# Patient Record
Sex: FEMALE | Race: WHITE | NOT HISPANIC OR LATINO | Employment: FULL TIME | URBAN - METROPOLITAN AREA
[De-identification: names, ages, dates, MRNs, and addresses within clinical notes are randomized per-mention and may not be internally consistent; named-entity substitution may affect disease eponyms.]

---

## 2017-02-09 ENCOUNTER — OFFICE VISIT (OUTPATIENT)
Dept: DERMATOLOGY | Facility: CLINIC | Age: 62
End: 2017-02-09
Payer: COMMERCIAL

## 2017-02-09 VITALS — HEIGHT: 62 IN | WEIGHT: 136 LBS | BODY MASS INDEX: 25.03 KG/M2

## 2017-02-09 DIAGNOSIS — D22.9 MULTIPLE BENIGN NEVI: ICD-10-CM

## 2017-02-09 DIAGNOSIS — Z12.83 SKIN CANCER SCREENING: ICD-10-CM

## 2017-02-09 DIAGNOSIS — L91.8 CUTANEOUS SKIN TAGS: Primary | ICD-10-CM

## 2017-02-09 DIAGNOSIS — R20.9 SKIN SENSATION DISTURBANCE: ICD-10-CM

## 2017-02-09 DIAGNOSIS — L82.1 SEBORRHEIC KERATOSES: ICD-10-CM

## 2017-02-09 PROCEDURE — 11200 RMVL SKIN TAGS UP TO&INC 15: CPT | Mod: S$GLB,,, | Performed by: DERMATOLOGY

## 2017-02-09 PROCEDURE — 99203 OFFICE O/P NEW LOW 30 MIN: CPT | Mod: 25,S$GLB,, | Performed by: DERMATOLOGY

## 2017-02-09 PROCEDURE — 99999 PR PBB SHADOW E&M-EST. PATIENT-LVL II: CPT | Mod: PBBFAC,,, | Performed by: DERMATOLOGY

## 2017-02-09 NOTE — PROGRESS NOTES
Subjective:       Patient ID:  Mallorychiara David is a 61 y.o. female who presents for   Chief Complaint   Patient presents with    Skin Check    Lesion    Dry Skin     HPI Comments: Pt here for IV skin check. Last skin exam several years ago  Lesion on left nose present for most of her life, asymptomatic, not treated  Dry patches of skin on left lower leg for several months, not treated  Lesion on right cheek for several months, asymptomatic, not treated    No phx of skin cancer  Mother and Father hx of BCC  Uses Neutrogena cream Spf 15 healthy skin on face daily     Growths on right side of neck, irritating, months, desires removal.     Review of Systems   Skin: Positive for daily sunscreen use and activity-related sunscreen use. Negative for recent sunburn.        Objective:    Physical Exam   Constitutional: She appears well-developed and well-nourished. No distress.   HENT:   Head:       Mouth/Throat: Lips normal.    Eyes: Lids are normal.  No conjunctival no injection.   Cardiovascular: There is no local extremity swelling and no dependent edema.     Neurological: She is alert and oriented to person, place, and time. She is not disoriented.   Psychiatric: She has a normal mood and affect.   Skin:   Areas Examined (abnormalities noted in diagram):   Head / Face Inspection Performed  Neck Inspection Performed  Chest / Axilla Inspection Performed  Abdomen Inspection Performed  Genitals / Buttocks / Groin Inspection Performed  Back Inspection Performed  RUE Inspected  LUE Inspection Performed  RLE Inspected  LLE Inspection Performed              Diagram Legend     Erythematous scaling macule/papule c/w actinic keratosis       Vascular papule c/w angioma      Pigmented verrucoid papule/plaque c/w seborrheic keratosis      Yellow umbilicated papule c/w sebaceous hyperplasia      Irregularly shaped tan macule c/w lentigo     1-2 mm smooth white papules consistent with Milia      Movable subcutaneous cyst with  punctum c/w epidermal inclusion cyst      Subcutaneous movable cyst c/w pilar cyst      Firm pink to brown papule c/w dermatofibroma      Pedunculated fleshy papule(s) c/w skin tag(s)      Evenly pigmented macule c/w junctional nevus     Mildly variegated pigmented, slightly irregular-bordered macule c/w mildly atypical nevus      Flesh colored to evenly pigmented papule c/w intradermal nevus       Pink pearly papule/plaque c/w basal cell carcinoma      Erythematous hyperkeratotic cursted plaque c/w SCC      Surgical scar with no sign of skin cancer recurrence      Open and closed comedones      Inflammatory papules and pustules      Verrucoid papule consistent consistent with wart     Erythematous eczematous patches and plaques     Dystrophic onycholytic nail with subungual debris c/w onychomycosis     Umbilicated papule    Erythematous-base heme-crusted tan verrucoid plaque consistent with inflamed seborrheic keratosis     Erythematous Silvery Scaling Plaque c/w Psoriasis     See annotation      Assessment / Plan:        Cutaneous skin tags  Reassurance given to patient. No treatment is necessary.   Treatment of benign, asymptomatic lesions may be considered cosmetic.  Verbal consent obtained. 2 lesions removed with scissor snip removal after anesthesia with 1% lidocaine with epinephrine. Hemostasis achieved with aluminum chloride and hyfrecation. No complications.      Skin sensation disturbance    Multiple benign nevi  Discussed ABCDE's of nevi.  Monitor for new mole or moles that are becoming bigger, darker, irritated, or developing irregular borders.   rec at least annual derm f/u    Skin cancer screening  Upper body skin examination performed today including at least 6 points as noted in physical examination. No lesions suspicious for malignancy noted.        Seborrheic keratoses, trunk and extremities  These are benign inherited growths without a malignant potential. Reassurance given to patient. No  treatment is necessary.              Return in about 6 months (around 8/9/2017).

## 2017-02-28 ENCOUNTER — OFFICE VISIT (OUTPATIENT)
Dept: FAMILY MEDICINE | Facility: CLINIC | Age: 62
End: 2017-02-28
Payer: COMMERCIAL

## 2017-02-28 VITALS
HEIGHT: 62 IN | BODY MASS INDEX: 28.16 KG/M2 | TEMPERATURE: 99 F | HEART RATE: 68 BPM | SYSTOLIC BLOOD PRESSURE: 110 MMHG | WEIGHT: 153 LBS | DIASTOLIC BLOOD PRESSURE: 72 MMHG

## 2017-02-28 DIAGNOSIS — J06.9 UPPER RESPIRATORY TRACT INFECTION, UNSPECIFIED TYPE: Primary | ICD-10-CM

## 2017-02-28 PROCEDURE — 99999 PR PBB SHADOW E&M-EST. PATIENT-LVL III: CPT | Mod: PBBFAC,,, | Performed by: NURSE PRACTITIONER

## 2017-02-28 PROCEDURE — 3078F DIAST BP <80 MM HG: CPT | Mod: S$GLB,,, | Performed by: NURSE PRACTITIONER

## 2017-02-28 PROCEDURE — 99213 OFFICE O/P EST LOW 20 MIN: CPT | Mod: S$GLB,,, | Performed by: NURSE PRACTITIONER

## 2017-02-28 PROCEDURE — 1160F RVW MEDS BY RX/DR IN RCRD: CPT | Mod: S$GLB,,, | Performed by: NURSE PRACTITIONER

## 2017-02-28 PROCEDURE — 3074F SYST BP LT 130 MM HG: CPT | Mod: S$GLB,,, | Performed by: NURSE PRACTITIONER

## 2017-02-28 RX ORDER — BENZONATATE 200 MG/1
200 CAPSULE ORAL 3 TIMES DAILY PRN
Qty: 30 CAPSULE | Refills: 0 | Status: SHIPPED | OUTPATIENT
Start: 2017-02-28 | End: 2017-03-10

## 2017-02-28 RX ORDER — AZITHROMYCIN 250 MG/1
TABLET, FILM COATED ORAL
Qty: 6 TABLET | Refills: 0 | Status: SHIPPED | OUTPATIENT
Start: 2017-02-28 | End: 2017-03-05

## 2017-02-28 NOTE — MR AVS SNAPSHOT
[facility]  [address] [place] [address]  Phone: [phone]  Fax: [phone]                  Mallory David   2017 11:40 AM   Office Visit    Description:  Female : 1955   Provider:  Maira Sanchez NP   Department:  [facility]           Reason for Visit     Cough     Sore Throat           Diagnoses this Visit        Comments    Upper respiratory tract infection, unspecified type    -  Primary            To Do List           Future Appointments        Provider Department Dept Phone    4/3/2017 8:40 AM LABORATORY, [place] [facility] [phone]      Goals (5 Years of Data)     None       These Medications        Disp Refills Start End    azithromycin (Z-DWAYNE) 250 MG tablet 6 tablet 0 2017 3/5/2017    Take 2 tablets by mouth on day 1; Take 1 tablet by mouth on days 2-5    Pharmacy: [facility] [address] [place], [place] [address] [place] [address] [address] [facility] Ph #: [phone]       benzonatate (TESSALON) 200 MG capsule 30 capsule 0 2017 3/10/2017    Take 1 capsule (200 mg total) by mouth 3 (three) times daily as needed. - Oral    Pharmacy: [facility] [address] [place], [place] [address] AT [place] [address] [address] [facility] Ph #: [phone]         [facility]     [facility] -  Assistance  Registered nurses in the [facility] provide clinical advisement, health education, appointment booking, and other advisory services.  Call for this free service at [phone].             Medications           Message regarding Medications     Verify the changes and/or additions to your medication regime listed below are the same as discussed with your clinician today.  If any of these changes or additions are incorrect, please notify your healthcare provider.        START taking these NEW medications        Refills    azithromycin (Z-DWAYNE)  250 MG tablet 0    Sig: Take 2 tablets by mouth on day 1; Take 1 tablet by mouth on days 2-5    Class: Normal    benzonatate (TESSALON) 200 MG capsule 0    Sig: Take 1 capsule (200 mg total) by mouth 3 (three) times daily as needed.    Class: Normal    Route: Oral      STOP taking these medications     CALCIUM CARB/MAGNESIUM CMB #10 (LINA-MAG ORAL) Take by mouth.    multivitamin capsule Take 1 capsule by mouth once daily.           Verify that the below list of medications is an accurate representation of the medications you are currently taking.  If none reported, the list may be blank. If incorrect, please contact your healthcare provider. Carry this list with you in case of emergency.           Current Medications     ANTIOX #11/OM3/DHA/EPA/LUT/NAMAN (50+ ADULT EYE HEALTH ORAL) Take by mouth.    aspirin 81 MG chewable tablet Take 81 mg by mouth once daily.      cetirizine (ZYRTEC) 10 MG tablet Take 10 mg by mouth once daily.    CRESTOR 10 mg tablet TAKE ONE TABLET BY MOUTH ONCE DAILY    hydrochlorothiazide (HYDRODIURIL) 25 MG tablet TAKE ONE TABLET BY MOUTH EVERY MORNING    levothyroxine (SYNTHROID) 112 MCG tablet Take 1 tablet (112 mcg total) by mouth once daily.    metoprolol succinate (TOPROL-XL) 50 MG 24 hr tablet TAKE ONE TABLET BY MOUTH EVERY MORNING AND TAKE 1/2 TABLET EVERY EVENING    potassium chloride SA (K-DUR,KLOR-CON) 20 MEQ tablet TAKE ONE TABLET BY MOUTH ONCE DAILY    azithromycin (Z-DWAYNE) 250 MG tablet Take 2 tablets by mouth on day 1; Take 1 tablet by mouth on days 2-5    benzonatate (TESSALON) 200 MG capsule Take 1 capsule (200 mg total) by mouth 3 (three) times daily as needed.           Clinical Reference Information           Your Vitals Were     BP                   110/72           Blood Pressure          Most Recent Value    BP  110/72      Allergies as of 2/28/2017     No Known Drug Allergies      Immunizations Administered on Date of Encounter - 2/28/2017     None      Language Assistance  Services     ATTENTION: Language assistance services are available, free of charge. Please call [phone].      ATENCIÓN: Si habla bulmaro, tiene a sparrow disposición servicios gratuitos de asistencia lingüística. Llame al [phone].     CHÚ Ý: N?u b?n nói Ti?ng Vi?t, có các d?ch v? h? tr? ngôn ng? mi?n phí dành cho b?n. G?i s? [phone].         [facility] complies with applicable Federal civil rights laws and does not discriminate on the basis of race, color, national origin, age, disability, or sex.

## 2017-02-28 NOTE — PROGRESS NOTES
Subjective:       Patient ID: Mallorychiara David is a 61 y.o. female.    Chief Complaint: Cough and Sore Throat    URI    This is a new problem. The current episode started in the past 7 days. The problem has been gradually worsening. There has been no fever. Associated symptoms include congestion, coughing, headaches, rhinorrhea and a sore throat. Pertinent negatives include no abdominal pain, chest pain, diarrhea, dysuria, ear pain, joint pain, joint swelling, nausea, neck pain, plugged ear sensation, rash, sinus pain, sneezing, swollen glands, vomiting or wheezing. She has tried nothing for the symptoms. The treatment provided no relief.     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    HENT: Positive for congestion, rhinorrhea and sore throat. Negative for ear pain and sneezing.    Eyes: Negative.    Respiratory: Positive for cough. Negative for wheezing.    Cardiovascular: Negative.  Negative for  chest pain.   Gastrointestinal: Negative.  Negative for abdominal pain, diarrhea, nausea and vomiting.   Endocrine: Negative.    Genitourinary: Negative.  Negative for dysuria.   Musculoskeletal: Negative.  Negative for joint pain and neck pain.   Skin: Negative.  Negative for rash.   Allergic/Immunologic: Negative.    Neurological: Positive for headaches.   Psychiatric/Behavioral: Negative.        Objective:      Physical Exam   Constitutional: She is oriented to person, place, and time. She appears well-developed and well-nourished.   HENT:   Head: Normocephalic.   Right Ear: Hearing, tympanic membrane, external ear and ear canal normal.   Left Ear: Hearing, tympanic membrane, external ear and ear canal normal.   Nose: Mucosal edema and rhinorrhea present. Right sinus exhibits no maxillary sinus tenderness and no frontal sinus tenderness. Left sinus exhibits no maxillary sinus tenderness and no frontal sinus tenderness.   Mouth/Throat: Uvula is midline and mucous membranes are normal. Posterior oropharyngeal erythema present.   Eyes: Conjunctivae are normal. Pupils are equal, round, and reactive to light.   Neck: Normal range of motion. Neck supple.   Cardiovascular: Normal rate, regular rhythm and normal heart sounds.    Pulmonary/Chest: Effort normal and breath sounds normal.   Abdominal: Soft. Bowel sounds are normal.   Musculoskeletal: Normal range of motion.   Neurological: She is alert and oriented to person, place, and time.   Skin: Skin is warm and dry.   Psychiatric: She has a normal mood and affect. Her behavior is normal. Judgment and thought content normal.   Nursing note and vitals reviewed.      Assessment:       1. Upper respiratory tract infection, unspecified type        Plan:           Mallory was seen today for cough and sore throat.    Diagnoses and all orders for this visit:    Upper respiratory tract infection, unspecified type  -     azithromycin (Z-DWAYNE) 250 MG tablet; Take 2 tablets by mouth  on day 1; Take 1 tablet by mouth on days 2-5  -     benzonatate (TESSALON) 200 MG capsule; Take 1 capsule (200 mg total) by mouth 3 (three) times daily as needed.  Begin abx if symptoms persist >7-10 d

## 2017-03-31 RX ORDER — LEVOTHYROXINE SODIUM 112 UG/1
112 TABLET ORAL DAILY
Qty: 90 TABLET | Refills: 2 | Status: SHIPPED | OUTPATIENT
Start: 2017-03-31 | End: 2017-09-29 | Stop reason: SDUPTHER

## 2017-04-03 ENCOUNTER — LAB VISIT (OUTPATIENT)
Dept: LAB | Facility: HOSPITAL | Age: 62
End: 2017-04-03
Attending: FAMILY MEDICINE
Payer: COMMERCIAL

## 2017-04-03 DIAGNOSIS — E78.5 HYPERLIPIDEMIA, UNSPECIFIED HYPERLIPIDEMIA TYPE: ICD-10-CM

## 2017-04-03 LAB
ALBUMIN SERPL BCP-MCNC: 4.3 G/DL
ALP SERPL-CCNC: 72 U/L
ALT SERPL W/O P-5'-P-CCNC: 31 U/L
AST SERPL-CCNC: 25 U/L
BILIRUB DIRECT SERPL-MCNC: 0.4 MG/DL
BILIRUB SERPL-MCNC: 0.9 MG/DL
CHOLEST/HDLC SERPL: 2.4 {RATIO}
HDL/CHOLESTEROL RATIO: 41 %
HDLC SERPL-MCNC: 161 MG/DL
HDLC SERPL-MCNC: 66 MG/DL
LDLC SERPL CALC-MCNC: 85.8 MG/DL
NONHDLC SERPL-MCNC: 95 MG/DL
PROT SERPL-MCNC: 7.5 G/DL
TRIGL SERPL-MCNC: 46 MG/DL

## 2017-04-03 PROCEDURE — 80061 LIPID PANEL: CPT

## 2017-04-03 PROCEDURE — 36415 COLL VENOUS BLD VENIPUNCTURE: CPT | Mod: PO

## 2017-04-03 PROCEDURE — 80076 HEPATIC FUNCTION PANEL: CPT

## 2017-04-04 ENCOUNTER — PATIENT MESSAGE (OUTPATIENT)
Dept: FAMILY MEDICINE | Facility: CLINIC | Age: 62
End: 2017-04-04

## 2017-04-05 RX ORDER — ROSUVASTATIN CALCIUM 10 MG/1
10 TABLET, COATED ORAL DAILY
Qty: 30 TABLET | Refills: 11 | Status: SHIPPED | OUTPATIENT
Start: 2017-04-05 | End: 2018-05-03 | Stop reason: SDUPTHER

## 2017-04-05 NOTE — TELEPHONE ENCOUNTER
Health Maintenance Due   Topic Date Due    TETANUS VACCINE  09/16/1974    Influenza Vaccine  08/01/2016    Zoster Vaccine  09/16/2016    Mammogram  05/05/2017     Book the above.

## 2017-05-29 RX ORDER — POTASSIUM CHLORIDE 20 MEQ/1
20 TABLET, EXTENDED RELEASE ORAL DAILY
Qty: 30 TABLET | Refills: 3 | Status: SHIPPED | OUTPATIENT
Start: 2017-05-29 | End: 2017-06-27 | Stop reason: SDUPTHER

## 2017-06-27 RX ORDER — POTASSIUM CHLORIDE 20 MEQ/1
20 TABLET, EXTENDED RELEASE ORAL DAILY
Qty: 30 TABLET | Refills: 3 | Status: SHIPPED | OUTPATIENT
Start: 2017-06-27 | End: 2018-05-04 | Stop reason: SDUPTHER

## 2017-07-31 ENCOUNTER — PATIENT MESSAGE (OUTPATIENT)
Dept: FAMILY MEDICINE | Facility: CLINIC | Age: 62
End: 2017-07-31

## 2017-07-31 NOTE — TELEPHONE ENCOUNTER
Pt wanted an appointment today with Dr. Sandoval. Pt states she does not want to see a nurse practitioner at all. Pt scheduled with Dr. Banks 08/02/17 @ 7:40.

## 2017-08-02 ENCOUNTER — OFFICE VISIT (OUTPATIENT)
Dept: FAMILY MEDICINE | Facility: CLINIC | Age: 62
End: 2017-08-02
Payer: COMMERCIAL

## 2017-08-02 ENCOUNTER — HOSPITAL ENCOUNTER (OUTPATIENT)
Dept: RADIOLOGY | Facility: HOSPITAL | Age: 62
Discharge: HOME | End: 2017-08-02
Attending: FAMILY MEDICINE
Payer: COMMERCIAL

## 2017-08-02 VITALS
HEART RATE: 81 BPM | DIASTOLIC BLOOD PRESSURE: 74 MMHG | SYSTOLIC BLOOD PRESSURE: 125 MMHG | HEIGHT: 62 IN | BODY MASS INDEX: 29.26 KG/M2 | WEIGHT: 159 LBS | TEMPERATURE: 99 F

## 2017-08-02 DIAGNOSIS — M25.562 ACUTE PAIN OF LEFT KNEE: ICD-10-CM

## 2017-08-02 DIAGNOSIS — M25.562 ACUTE PAIN OF LEFT KNEE: Primary | ICD-10-CM

## 2017-08-02 PROCEDURE — 73562 X-RAY EXAM OF KNEE 3: CPT | Mod: 26,LT,, | Performed by: RADIOLOGY

## 2017-08-02 PROCEDURE — 99213 OFFICE O/P EST LOW 20 MIN: CPT | Mod: S$GLB,,, | Performed by: FAMILY MEDICINE

## 2017-08-02 PROCEDURE — 73560 X-RAY EXAM OF KNEE 1 OR 2: CPT | Mod: 26,RT,, | Performed by: RADIOLOGY

## 2017-08-02 PROCEDURE — 99999 PR PBB SHADOW E&M-EST. PATIENT-LVL IV: CPT | Mod: PBBFAC,,, | Performed by: FAMILY MEDICINE

## 2017-08-02 PROCEDURE — 73560 X-RAY EXAM OF KNEE 1 OR 2: CPT | Mod: TC,PO,RT

## 2017-08-02 RX ORDER — MELOXICAM 15 MG/1
15 TABLET ORAL DAILY
Qty: 30 TABLET | Refills: 0 | Status: SHIPPED | OUTPATIENT
Start: 2017-08-02 | End: 2017-09-01

## 2017-08-02 NOTE — PROGRESS NOTES
"Subjective:      Patient ID: Mallory David is a 61 y.o. female.    Chief Complaint: Leg Pain    HPIchely fell onto her right knee last week and she states that the pain in the right side was the biggest complaint at the time however over time, she has had increased pain in the left knee.  4 days ago, she had gone to [place] and she states that she could not walk good.  She had on tennis shoes.  She took this off and she states she could not stand on her left leg.  She tried stretching it ot help it.  She had a heavinesss of     Review of Systems   Constitutional: Positive for activity change. Negative for unexpected weight change.   HENT: Negative for hearing loss, rhinorrhea and trouble swallowing.    Eyes: Negative for discharge and visual disturbance.   Respiratory: Negative for chest tightness and wheezing.    Cardiovascular: Negative for chest pain and palpitations.   Gastrointestinal: Negative for blood in stool, constipation, diarrhea and vomiting.   Endocrine: Negative for polydipsia and polyuria.   Genitourinary: Negative for difficulty urinating, dysuria, hematuria and menstrual problem.   Musculoskeletal: Positive for arthralgias and joint swelling. Negative for neck pain.   Neurological: Negative for headaches.   Psychiatric/Behavioral: Negative for confusion and dysphoric mood.       Objective:   /74   Pulse 81   Temp 98.7 °F (37.1 °C) (Oral)   Ht 5' 2" (1.575 m)   Wt 72.1 kg (159 lb)   LMP 09/20/2011   BMI 29.08 kg/m²     Physical Exam   Musculoskeletal:        Left knee: She exhibits swelling (this is in the popliteal fossa.  ). She exhibits normal range of motion, no effusion, no deformity, no erythema, normal alignment, no LCL laxity, no bony tenderness, normal meniscus and no MCL laxity. Tenderness found. Medial joint line and lateral joint line tenderness noted.     Xray was neg.    Assessment:     1. Acute pain of left knee        Plan:   Mallory was seen today for leg pain.    Diagnoses and " all orders for this visit:    Acute pain of left knee  -     Cancel: X-Ray Knee 1 or 2 View Left; Future    Other orders  -     meloxicam (MOBIC) 15 MG tablet; Take 1 tablet (15 mg total) by mouth once daily.

## 2017-09-29 DIAGNOSIS — Z12.31 VISIT FOR SCREENING MAMMOGRAM: Primary | ICD-10-CM

## 2017-09-29 RX ORDER — HYDROCHLOROTHIAZIDE 25 MG/1
25 TABLET ORAL EVERY MORNING
Qty: 90 TABLET | Refills: 3 | Status: SHIPPED | OUTPATIENT
Start: 2017-09-29 | End: 2018-06-06 | Stop reason: SDUPTHER

## 2017-09-29 RX ORDER — LEVOTHYROXINE SODIUM 112 UG/1
112 TABLET ORAL DAILY
Qty: 90 TABLET | Refills: 2 | Status: SHIPPED | OUTPATIENT
Start: 2017-09-29 | End: 2018-06-06 | Stop reason: SDUPTHER

## 2017-09-29 RX ORDER — METOPROLOL SUCCINATE 50 MG/1
TABLET, EXTENDED RELEASE ORAL
Qty: 135 TABLET | Refills: 3 | Status: SHIPPED | OUTPATIENT
Start: 2017-09-29 | End: 2018-06-06 | Stop reason: SDUPTHER

## 2017-10-02 DIAGNOSIS — Z23 NEED FOR PROPHYLACTIC VACCINATION AND INOCULATION AGAINST INFLUENZA: Primary | ICD-10-CM

## 2017-10-05 ENCOUNTER — HOSPITAL ENCOUNTER (OUTPATIENT)
Dept: RADIOLOGY | Facility: HOSPITAL | Age: 62
Discharge: HOME | End: 2017-10-05
Attending: FAMILY MEDICINE
Payer: COMMERCIAL

## 2017-10-05 ENCOUNTER — CLINICAL SUPPORT (OUTPATIENT)
Dept: FAMILY MEDICINE | Facility: CLINIC | Age: 62
End: 2017-10-05
Payer: COMMERCIAL

## 2017-10-05 VITALS — HEIGHT: 62 IN | BODY MASS INDEX: 29.25 KG/M2 | WEIGHT: 158.94 LBS

## 2017-10-05 DIAGNOSIS — Z23 ENCOUNTER FOR ADMINISTRATION OF VACCINE: Primary | ICD-10-CM

## 2017-10-05 DIAGNOSIS — Z12.31 VISIT FOR SCREENING MAMMOGRAM: ICD-10-CM

## 2017-10-05 PROCEDURE — 77067 SCR MAMMO BI INCL CAD: CPT | Mod: 26,,, | Performed by: RADIOLOGY

## 2017-10-05 PROCEDURE — 77067 SCR MAMMO BI INCL CAD: CPT | Mod: TC

## 2017-10-05 PROCEDURE — 90471 IMMUNIZATION ADMIN: CPT | Mod: S$GLB,,, | Performed by: FAMILY MEDICINE

## 2017-10-05 PROCEDURE — 90686 IIV4 VACC NO PRSV 0.5 ML IM: CPT | Mod: S$GLB,,, | Performed by: FAMILY MEDICINE

## 2017-10-05 PROCEDURE — 77063 BREAST TOMOSYNTHESIS BI: CPT | Mod: 26,,, | Performed by: RADIOLOGY

## 2018-05-03 RX ORDER — ROSUVASTATIN CALCIUM 10 MG/1
10 TABLET, COATED ORAL DAILY
Qty: 30 TABLET | Refills: 0 | Status: SHIPPED | OUTPATIENT
Start: 2018-05-03 | End: 2018-06-06 | Stop reason: SDUPTHER

## 2018-05-04 RX ORDER — POTASSIUM CHLORIDE 20 MEQ/1
20 TABLET, EXTENDED RELEASE ORAL DAILY
Qty: 30 TABLET | Refills: 3 | Status: SHIPPED | OUTPATIENT
Start: 2018-05-04 | End: 2018-06-06 | Stop reason: SDUPTHER

## 2018-05-31 ENCOUNTER — PATIENT MESSAGE (OUTPATIENT)
Dept: FAMILY MEDICINE | Facility: CLINIC | Age: 63
End: 2018-05-31

## 2018-06-06 ENCOUNTER — PATIENT MESSAGE (OUTPATIENT)
Dept: FAMILY MEDICINE | Facility: CLINIC | Age: 63
End: 2018-06-06

## 2018-06-06 ENCOUNTER — OFFICE VISIT (OUTPATIENT)
Dept: FAMILY MEDICINE | Facility: CLINIC | Age: 63
End: 2018-06-06
Payer: COMMERCIAL

## 2018-06-06 VITALS
BODY MASS INDEX: 30.18 KG/M2 | TEMPERATURE: 99 F | WEIGHT: 164 LBS | SYSTOLIC BLOOD PRESSURE: 125 MMHG | DIASTOLIC BLOOD PRESSURE: 81 MMHG | HEIGHT: 62 IN | HEART RATE: 73 BPM

## 2018-06-06 DIAGNOSIS — Q21.11 ASD (ATRIAL SEPTAL DEFECT), OSTIUM SECUNDUM: ICD-10-CM

## 2018-06-06 DIAGNOSIS — E03.9 HYPOTHYROIDISM, UNSPECIFIED TYPE: ICD-10-CM

## 2018-06-06 DIAGNOSIS — E78.5 HYPERLIPIDEMIA, UNSPECIFIED HYPERLIPIDEMIA TYPE: ICD-10-CM

## 2018-06-06 DIAGNOSIS — I47.10 PAROXYSMAL SUPRAVENTRICULAR TACHYCARDIA: ICD-10-CM

## 2018-06-06 DIAGNOSIS — Z00.00 ANNUAL PHYSICAL EXAM: Primary | ICD-10-CM

## 2018-06-06 DIAGNOSIS — I10 ESSENTIAL HYPERTENSION: ICD-10-CM

## 2018-06-06 PROCEDURE — 99999 PR PBB SHADOW E&M-EST. PATIENT-LVL IV: CPT | Mod: PBBFAC,,, | Performed by: FAMILY MEDICINE

## 2018-06-06 PROCEDURE — 3079F DIAST BP 80-89 MM HG: CPT | Mod: CPTII,S$GLB,, | Performed by: FAMILY MEDICINE

## 2018-06-06 PROCEDURE — 99396 PREV VISIT EST AGE 40-64: CPT | Mod: S$GLB,,, | Performed by: FAMILY MEDICINE

## 2018-06-06 PROCEDURE — 3074F SYST BP LT 130 MM HG: CPT | Mod: CPTII,S$GLB,, | Performed by: FAMILY MEDICINE

## 2018-06-06 RX ORDER — METOPROLOL SUCCINATE 50 MG/1
50 TABLET, EXTENDED RELEASE ORAL DAILY
Qty: 90 TABLET | Refills: 3 | Status: SHIPPED | OUTPATIENT
Start: 2018-06-06 | End: 2019-08-19 | Stop reason: SDUPTHER

## 2018-06-06 RX ORDER — ROSUVASTATIN CALCIUM 10 MG/1
10 TABLET, COATED ORAL DAILY
Qty: 90 TABLET | Refills: 3 | Status: SHIPPED | OUTPATIENT
Start: 2018-06-06 | End: 2018-06-08 | Stop reason: SDUPTHER

## 2018-06-06 RX ORDER — HYDROCHLOROTHIAZIDE 25 MG/1
25 TABLET ORAL EVERY MORNING
Qty: 90 TABLET | Refills: 3 | Status: SHIPPED | OUTPATIENT
Start: 2018-06-06 | End: 2019-06-17 | Stop reason: SDUPTHER

## 2018-06-06 RX ORDER — LEVOTHYROXINE SODIUM 112 UG/1
112 TABLET ORAL DAILY
Qty: 90 TABLET | Refills: 3 | Status: SHIPPED | OUTPATIENT
Start: 2018-06-06 | End: 2019-06-25 | Stop reason: SDUPTHER

## 2018-06-06 RX ORDER — POTASSIUM CHLORIDE 20 MEQ/1
20 TABLET, EXTENDED RELEASE ORAL DAILY
Qty: 30 TABLET | Refills: 3 | Status: SHIPPED | OUTPATIENT
Start: 2018-06-06 | End: 2018-11-27 | Stop reason: SDUPTHER

## 2018-06-06 NOTE — PROGRESS NOTES
"Subjective:      Patient ID: Mallorychiara David is a 62 y.o. female.    Chief Complaint: Annual Exam    HPI She has a history of a nontoxic uninodular goiter and she has seen Dr. Vaca in the past for this and she gets U/S's.    Lab Results   Component Value Date    TSH 2.490 12/02/2016     The patient presents with hyperlipidemia. The patient reports tolerating the medication well and is in excellent compliance. There have been no medication side effects. The patient denies chest pain, neuropathy, and myalgias. The patient has reduced fat intake and has been exercising. Current treatment has included the medications listed in the med card.   Lab Results   Component Value Date    CHOL 161 04/03/2017    CHOL 166 09/30/2016    CHOL 174 03/30/2016     Lab Results   Component Value Date    HDL 66 04/03/2017    HDL 69 09/30/2016    HDL 79 (H) 03/30/2016     Lab Results   Component Value Date    LDLCALC 85.8 04/03/2017    LDLCALC 81.4 09/30/2016    LDLCALC 78.6 03/30/2016     Lab Results   Component Value Date    TRIG 46 04/03/2017    TRIG 78 09/30/2016    TRIG 82 03/30/2016     Lab Results   Component Value Date    CHOLHDL 41.0 04/03/2017    CHOLHDL 41.6 09/30/2016    CHOLHDL 45.4 03/30/2016     The patient presents with essential hypertension.  The patient is tolerating the medication well and is in excellent compliance.  The patient is experiencing no side effects.  Counseling was offered regarding low salt diets.  The patient has a reduced salt intake.  The patient denies chest pain, palpitations, shortness of breath, dyspnea on exertion, left or murmur neck pain, nausea, vomiting, diaphoresis, paroxysmal nocturnal dyspnea, and orthopnea.   /81   Pulse 73   Temp 99.1 °F (37.3 °C) (Oral)   Ht 5' 2" (1.575 m)   Wt 74.4 kg (164 lb)   LMP 09/20/2011   BMI 30.00 kg/m²       She hs had aflutter and she is on the beta blocker successfully without any fluttering.  She has had a workup in the not too distant past with " Dr. Holt but she would like to change cardiologists at this    Past Medical History:  Past Medical History:   Diagnosis Date    *Atrial flutter     s/p ablation    Abnormal Pap smear of cervix     ASD (atrial septal defect), ostium secundum     repaired    Benign hypertensive heart disease without heart failure     Endometriosis     Fatty liver     Hyperlipidemia     Hypertension     2009    Nodular goiter (nontoxic)     1999    Special screening for malignant neoplasms, colon 10/28/2014     Past Surgical History:   Procedure Laterality Date    ABLATION OF DYSRHYTHMIC FOCUS      ASD REPAIR, SECUNDUM      CARPAL TUNNEL RELEASE  2007    right hand    Heart ablation  2011    OOPHORECTOMY      Open Heart Surgery  1970    OVARIAN CYST SURGERY  1981     Review of patient's allergies indicates:   Allergen Reactions    No known drug allergies      Current Outpatient Prescriptions on File Prior to Visit   Medication Sig Dispense Refill    ANTIOX #11/OM3/DHA/EPA/LUT/NAMAN (50+ ADULT EYE HEALTH ORAL) Take by mouth.      aspirin 81 MG chewable tablet Take 81 mg by mouth once daily.        cetirizine (ZYRTEC) 10 MG tablet Take 10 mg by mouth once daily.      hydrochlorothiazide (HYDRODIURIL) 25 MG tablet Take 1 tablet (25 mg total) by mouth every morning. 90 tablet 3    levothyroxine (SYNTHROID) 112 MCG tablet Take 1 tablet (112 mcg total) by mouth once daily. 90 tablet 2    metoprolol succinate (TOPROL-XL) 50 MG 24 hr tablet TAKE ONE TABLET BY MOUTH EVERY MORNING AND TAKE 1/2 TABLET EVERY EVENING (Patient taking differently: Take 50 mg by mouth once daily. TAKE ONE TABLET BY MOUTH EVERY MORNING AND TAKE 1/2 TABLET EVERY EVENING) 135 tablet 3    potassium chloride SA (K-DUR,KLOR-CON) 20 MEQ tablet Take 1 tablet (20 mEq total) by mouth once daily. 30 tablet 3    rosuvastatin (CRESTOR) 10 MG tablet Take 1 tablet (10 mg total) by mouth once daily. 30 tablet 0     No current facility-administered  "medications on file prior to visit.      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   HENT: Negative for congestion, ear pain, hearing loss, postnasal drip, rhinorrhea, sore throat and trouble swallowing.    Eyes: Negative for discharge and visual disturbance.   Respiratory: Negative for cough, chest tightness, shortness of breath and wheezing.    Cardiovascular: Negative for chest pain, palpitations and leg swelling.   Gastrointestinal: Negative for abdominal pain, blood in stool, constipation, diarrhea, nausea and vomiting.   Endocrine: Negative for polydipsia and polyuria.   Genitourinary: Negative for difficulty urinating, dysuria, hematuria and menstrual problem.   Musculoskeletal: Positive for arthralgias. Negative for joint swelling and neck pain.   Neurological: Negative for weakness, numbness and headaches.   Psychiatric/Behavioral: Negative for confusion and dysphoric mood.       Objective:   /81   Pulse 73   Temp 99.1 °F (37.3 °C) (Oral)   Ht 5' 2" (1.575 m)   Wt 74.4 kg (164 lb)   LMP 09/20/2011   BMI 30.00 kg/m²     Physical Exam   Constitutional: She appears well-developed and well-nourished. She is cooperative.   HENT:   Head: Normocephalic and atraumatic.   Right Ear: Tympanic membrane, external ear and ear canal normal. "   Left Ear: Tympanic membrane, external ear and ear canal normal.   Nose: Nose normal.   Mouth/Throat: Uvula is midline and mucous membranes are normal. No oral lesions. No oropharyngeal exudate, posterior oropharyngeal edema or posterior oropharyngeal erythema.   Eyes: EOM and lids are normal. Pupils are equal, round, and reactive to light. Right eye exhibits no discharge. Left eye exhibits no discharge. Right conjunctiva is not injected. Right conjunctiva has no hemorrhage. Left conjunctiva is not injected. Left conjunctiva has no hemorrhage. No scleral icterus. Right eye exhibits no nystagmus. Left eye exhibits no nystagmus.   Neck: Normal range of motion and full passive range of motion without pain. Neck supple. No JVD present. No tracheal tenderness present. Carotid bruit is not present. No tracheal deviation present. No thyroid mass and no thyromegaly present.   Cardiovascular: Normal rate, regular rhythm, S1 normal and S2 normal.    No murmur heard.  Pulses:       Carotid pulses are 2+ on the right side, and 2+ on the left side.       Radial pulses are 2+ on the right side, and 2+ on the left side.        Dorsalis pedis pulses are 2+ on the right side, and 2+ on the left side.        Posterior tibial pulses are 2+ on the right side, and 2+ on the left side.   Pulmonary/Chest: Effort normal and breath sounds normal. No respiratory distress. She has no wheezes. She has no rhonchi. She has no rales.   Abdominal: Soft. Normal appearance, normal aorta and bowel sounds are normal. She exhibits no distension, no abdominal bruit, no pulsatile midline mass and no mass. There is no hepatosplenomegaly. There is no tenderness. There is no rebound.   Musculoskeletal:        Right knee: She exhibits no swelling. No tenderness found.        Left knee: She exhibits no swelling. No tenderness found.        Right foot: There is normal range of motion and no deformity.        Left foot: There is normal range of motion and no  deformity.   Feet:   Right Foot:   Protective Sensation: 10 sites tested. 10 sites sensed.   Skin Integrity: Negative for ulcer, blister, skin breakdown, erythema, warmth, callus or dry skin.   Left Foot:   Protective Sensation: 10 sites tested. 10 sites sensed.   Skin Integrity: Negative for ulcer, blister, skin breakdown, erythema, warmth, callus or dry skin.   Lymphadenopathy:        Head (right side): No submental and no submandibular adenopathy present.        Head (left side): No submental and no submandibular adenopathy present.     She has no cervical adenopathy.   Neurological: She is alert. She has normal strength. No cranial nerve deficit or sensory deficit.   Skin: Skin is warm and dry. No rash noted. No cyanosis. Nails show no clubbing.   Psychiatric: She has a normal mood and affect. Her speech is normal and behavior is normal. Thought content normal. Cognition and memory are normal.       Assessment:     1. Annual physical exam    2. Essential hypertension    3. Hyperlipidemia, unspecified hyperlipidemia type    4. ASD (atrial septal defect), ostium secundum    5. Hypothyroidism, unspecified type    6. Paroxysmal supraventricular tachycardia        Plan:   Mallory was seen today for annual exam.    Diagnoses and all orders for this visit:    Annual physical exam    Essential hypertension  -     Comprehensive metabolic panel; Future  -     potassium chloride SA (K-DUR,KLOR-CON) 20 MEQ tablet; Take 1 tablet (20 mEq total) by mouth once daily.  -     metoprolol succinate (TOPROL-XL) 50 MG 24 hr tablet; Take 1 tablet (50 mg total) by mouth once daily.  -     levothyroxine (SYNTHROID) 112 MCG tablet; Take 1 tablet (112 mcg total) by mouth once daily.  -     hydroCHLOROthiazide (HYDRODIURIL) 25 MG tablet; Take 1 tablet (25 mg total) by mouth every morning.    Hyperlipidemia, unspecified hyperlipidemia type  -     Lipid panel; Future  -     rosuvastatin (CRESTOR) 10 MG tablet; Take 1 tablet (10 mg total) by  mouth once daily.    ASD (atrial septal defect), ostium secundum  -     Ambulatory referral to Cardiology    Hypothyroidism, unspecified type  -     TSH; Future  -     US Soft Tissue Head Neck Thyroid; Future    Paroxysmal supraventricular tachycardia  -     Ambulatory referral to Cardiology  -     metoprolol succinate (TOPROL-XL) 50 MG 24 hr tablet; Take 1 tablet (50 mg total) by mouth once daily.    Other orders  -     Tdap Vaccine

## 2018-06-07 ENCOUNTER — CLINICAL SUPPORT (OUTPATIENT)
Dept: FAMILY MEDICINE | Facility: CLINIC | Age: 63
End: 2018-06-07
Payer: COMMERCIAL

## 2018-06-07 ENCOUNTER — LAB VISIT (OUTPATIENT)
Dept: LAB | Facility: HOSPITAL | Age: 63
End: 2018-06-07
Attending: FAMILY MEDICINE
Payer: COMMERCIAL

## 2018-06-07 DIAGNOSIS — I10 ESSENTIAL HYPERTENSION: ICD-10-CM

## 2018-06-07 DIAGNOSIS — Z23 NEED FOR TDAP VACCINATION: Primary | ICD-10-CM

## 2018-06-07 DIAGNOSIS — E78.5 HYPERLIPIDEMIA, UNSPECIFIED HYPERLIPIDEMIA TYPE: ICD-10-CM

## 2018-06-07 DIAGNOSIS — E03.9 HYPOTHYROIDISM, UNSPECIFIED TYPE: ICD-10-CM

## 2018-06-07 LAB
ALBUMIN SERPL BCP-MCNC: 4.3 G/DL
ALP SERPL-CCNC: 68 U/L
ALT SERPL W/O P-5'-P-CCNC: 19 U/L
ANION GAP SERPL CALC-SCNC: 10 MMOL/L
AST SERPL-CCNC: 20 U/L
BILIRUB SERPL-MCNC: 1.3 MG/DL
BUN SERPL-MCNC: 19 MG/DL
CALCIUM SERPL-MCNC: 10.4 MG/DL
CHLORIDE SERPL-SCNC: 103 MMOL/L
CHOLEST SERPL-MCNC: 196 MG/DL
CHOLEST/HDLC SERPL: 2.6 {RATIO}
CO2 SERPL-SCNC: 30 MMOL/L
CREAT SERPL-MCNC: 0.7 MG/DL
EST. GFR  (AFRICAN AMERICAN): >60 ML/MIN/1.73 M^2
EST. GFR  (NON AFRICAN AMERICAN): >60 ML/MIN/1.73 M^2
GLUCOSE SERPL-MCNC: 107 MG/DL
HDLC SERPL-MCNC: 76 MG/DL
HDLC SERPL: 38.8 %
LDLC SERPL CALC-MCNC: 99.4 MG/DL
NONHDLC SERPL-MCNC: 120 MG/DL
POTASSIUM SERPL-SCNC: 4.1 MMOL/L
PROT SERPL-MCNC: 7.4 G/DL
SODIUM SERPL-SCNC: 143 MMOL/L
TRIGL SERPL-MCNC: 103 MG/DL
TSH SERPL DL<=0.005 MIU/L-ACNC: 2.95 UIU/ML

## 2018-06-07 PROCEDURE — 36415 COLL VENOUS BLD VENIPUNCTURE: CPT | Mod: PO

## 2018-06-07 PROCEDURE — 80053 COMPREHEN METABOLIC PANEL: CPT

## 2018-06-07 PROCEDURE — 90715 TDAP VACCINE 7 YRS/> IM: CPT | Mod: S$GLB,,, | Performed by: FAMILY MEDICINE

## 2018-06-07 PROCEDURE — 90471 IMMUNIZATION ADMIN: CPT | Mod: S$GLB,,, | Performed by: FAMILY MEDICINE

## 2018-06-07 PROCEDURE — 84443 ASSAY THYROID STIM HORMONE: CPT

## 2018-06-07 PROCEDURE — 99499 UNLISTED E&M SERVICE: CPT | Mod: S$GLB,,, | Performed by: FAMILY MEDICINE

## 2018-06-07 PROCEDURE — 80061 LIPID PANEL: CPT

## 2018-06-07 PROCEDURE — 99999 PR PBB SHADOW E&M-EST. PATIENT-LVL I: CPT | Mod: PBBFAC,,,

## 2018-06-07 NOTE — PROGRESS NOTES
Administered Tdap to left deltoid, Pt tolerated well. Instructed to wait in lobby 15 minutes prior to leaving. Lot [id number] 11/24/2019

## 2018-06-08 DIAGNOSIS — E78.5 HYPERLIPIDEMIA, UNSPECIFIED HYPERLIPIDEMIA TYPE: ICD-10-CM

## 2018-06-08 RX ORDER — ROSUVASTATIN CALCIUM 10 MG/1
10 TABLET, COATED ORAL DAILY
Qty: 90 TABLET | Refills: 3 | Status: SHIPPED | OUTPATIENT
Start: 2018-06-08 | End: 2019-03-18 | Stop reason: SDUPTHER

## 2018-06-08 NOTE — TELEPHONE ENCOUNTER
----- Message from Latrell Banks MD sent at 6/8/2018  8:27 AM CDT -----  The CMP and Lipid levels are acceptable.    Continue current treatment and recheck the labs in a year.   Confirm that the patient has a year of medicine refills and send a of refills if not.  Confirm that the patient sees their message on [employer] if they are signed up or send them a letter otherwise.     The patient's health maintenance that is due is below:  Zoster Vaccine due on 09/16/2016

## 2018-06-11 ENCOUNTER — HOSPITAL ENCOUNTER (OUTPATIENT)
Dept: RADIOLOGY | Facility: HOSPITAL | Age: 63
Discharge: HOME | End: 2018-06-11
Attending: FAMILY MEDICINE
Payer: COMMERCIAL

## 2018-06-11 DIAGNOSIS — E03.9 HYPOTHYROIDISM, UNSPECIFIED TYPE: ICD-10-CM

## 2018-06-11 PROCEDURE — 76536 US EXAM OF HEAD AND NECK: CPT | Mod: 26,,, | Performed by: RADIOLOGY

## 2018-06-11 PROCEDURE — 76536 US EXAM OF HEAD AND NECK: CPT | Mod: TC,PO

## 2018-07-09 ENCOUNTER — OFFICE VISIT (OUTPATIENT)
Dept: CARDIOLOGY | Facility: CLINIC | Age: 63
End: 2018-07-09
Payer: COMMERCIAL

## 2018-07-09 VITALS
HEART RATE: 74 BPM | SYSTOLIC BLOOD PRESSURE: 127 MMHG | DIASTOLIC BLOOD PRESSURE: 72 MMHG | BODY MASS INDEX: 30.33 KG/M2 | WEIGHT: 164.81 LBS | HEIGHT: 62 IN

## 2018-07-09 DIAGNOSIS — I47.10 PAROXYSMAL SUPRAVENTRICULAR TACHYCARDIA: ICD-10-CM

## 2018-07-09 DIAGNOSIS — E78.00 PURE HYPERCHOLESTEROLEMIA: ICD-10-CM

## 2018-07-09 DIAGNOSIS — Z00.00 ROUTINE GENERAL MEDICAL EXAMINATION AT A HEALTH CARE FACILITY: Primary | ICD-10-CM

## 2018-07-09 DIAGNOSIS — Z00.00 ROUTINE GENERAL MEDICAL EXAMINATION AT A HEALTH CARE FACILITY: ICD-10-CM

## 2018-07-09 DIAGNOSIS — I10 ESSENTIAL HYPERTENSION: ICD-10-CM

## 2018-07-09 DIAGNOSIS — Q21.11 OSTIUM SECUNDUM TYPE ATRIAL SEPTAL DEFECT: Primary | ICD-10-CM

## 2018-07-09 PROCEDURE — 3074F SYST BP LT 130 MM HG: CPT | Mod: CPTII,S$GLB,, | Performed by: INTERNAL MEDICINE

## 2018-07-09 PROCEDURE — 93000 ELECTROCARDIOGRAM COMPLETE: CPT | Mod: S$GLB,,, | Performed by: INTERNAL MEDICINE

## 2018-07-09 PROCEDURE — 3078F DIAST BP <80 MM HG: CPT | Mod: CPTII,S$GLB,, | Performed by: INTERNAL MEDICINE

## 2018-07-09 PROCEDURE — 99999 PR PBB SHADOW E&M-EST. PATIENT-LVL II: CPT | Mod: PBBFAC,,, | Performed by: INTERNAL MEDICINE

## 2018-07-09 PROCEDURE — 3008F BODY MASS INDEX DOCD: CPT | Mod: CPTII,S$GLB,, | Performed by: INTERNAL MEDICINE

## 2018-07-09 PROCEDURE — 99204 OFFICE O/P NEW MOD 45 MIN: CPT | Mod: S$GLB,,, | Performed by: INTERNAL MEDICINE

## 2018-07-09 NOTE — PROGRESS NOTES
Subjective:   Patient ID:  Mallorychiara David is a 62 y.o. female who presents for follow-up of Consult  Pt with hx of SVT/ aflutter  and ablation.Patient denies CP, angina or anginal equivalent.  Thyroid meds being adjusted.  CRF- HTN, HLP    Hypertension   This is a chronic problem. The current episode started more than 1 year ago. The problem has been gradually improving since onset. The problem is controlled. Pertinent negatives include no chest pain, palpitations or shortness of breath. Past treatments include beta blockers and diuretics. The current treatment provides moderate improvement. There are no compliance problems.    Hyperlipidemia   This is a chronic problem. The current episode started more than 1 year ago. The problem is controlled. Pertinent negatives include no chest pain or shortness of breath. Current antihyperlipidemic treatment includes statins. The current treatment provides moderate improvement of lipids. There are no compliance problems.        Review of Systems   Constitution: Negative. Negative for weight gain.   HENT: Negative.    Eyes: Negative.    Cardiovascular: Negative.  Negative for chest pain, leg swelling and palpitations.   Respiratory: Negative.  Negative for shortness of breath.    Endocrine: Negative.    Hematologic/Lymphatic: Negative.    Skin: Negative.    Musculoskeletal: Negative for muscle weakness.   Gastrointestinal: Negative.    Genitourinary: Negative.    Neurological: Negative.  Negative for dizziness.   Psychiatric/Behavioral: Negative.    Allergic/Immunologic: Negative.      Family History   Problem Relation Age of Onset    Hypertension Mother     Cancer Father     Diabetes Father     Breast cancer Sister     Aneurysm Brother         this was of his heart.     Past Medical History:   Diagnosis Date    *Atrial flutter     s/p ablation    Abnormal Pap smear of cervix     ASD (atrial septal defect), ostium secundum     repaired    Benign hypertensive heart  disease without heart failure     Endometriosis     Fatty liver     Hyperlipidemia     Hypertension     2009    Nodular goiter (nontoxic)     1999    Special screening for malignant neoplasms, colon 10/28/2014     Current Outpatient Prescriptions on File Prior to Visit   Medication Sig Dispense Refill    ANTIOX #11/OM3/DHA/EPA/LUT/NAMAN (50+ ADULT EYE HEALTH ORAL) Take by mouth.      aspirin 81 MG chewable tablet Take 81 mg by mouth once daily.        cetirizine (ZYRTEC) 10 MG tablet Take 10 mg by mouth once daily.      hydroCHLOROthiazide (HYDRODIURIL) 25 MG tablet Take 1 tablet (25 mg total) by mouth every morning. 90 tablet 3    levothyroxine (SYNTHROID) 112 MCG tablet Take 1 tablet (112 mcg total) by mouth once daily. 90 tablet 3    metoprolol succinate (TOPROL-XL) 50 MG 24 hr tablet Take 1 tablet (50 mg total) by mouth once daily. 90 tablet 3    potassium chloride SA (K-DUR,KLOR-CON) 20 MEQ tablet Take 1 tablet (20 mEq total) by mouth once daily. 30 tablet 3    rosuvastatin (CRESTOR) 10 MG tablet Take 1 tablet (10 mg total) by mouth once daily. 90 tablet 3     No current facility-administered medications on file prior to visit.      Review of patient's allergies indicates:   Allergen Reactions    No known drug allergies        Objective:     Physical Exam   Constitutional: She is oriented to person, place, and time. She appears well-developed and well-nourished.   HENT:   Head: Normocephalic and atraumatic.   Eyes: Conjunctivae and EOM are normal. Pupils are equal, round, and reactive to light.   Neck: Normal range of motion. Neck supple.   Cardiovascular: Normal rate, regular rhythm, normal heart sounds and intact distal pulses.    Pulmonary/Chest: Effort normal and breath sounds normal.   Abdominal: Soft. Bowel sounds are normal.   Musculoskeletal: Normal range of motion.   Neurological: She is alert and oriented to person, place, and time.   Skin: Skin is warm and dry.   Psychiatric: She has a  normal mood and affect.   Nursing note and vitals reviewed.      Assessment:     1. Ostium secundum type atrial septal defect    2. Paroxysmal supraventricular tachycardia    3. Essential hypertension    4. Pure hypercholesterolemia        Plan:     Ostium secundum type atrial septal defect    Paroxysmal supraventricular tachycardia    Essential hypertension    Pure hypercholesterolemia    continue metoprolol-hx of SVT/ablation  Continue metoprolol, hctz-htn  Continue statin-hlp

## 2018-07-09 NOTE — LETTER
July 10, 2018      Latrell Banks MD  [address] [address]           [facility]  [address] [facility] [address]  Phone: [phone]  Fax: [phone]          Patient: Mallory David   MR Number: [id number]   YOB: 1955   Date of Visit: 7/9/2018       Dear Dr. Latrell Banks:    Thank you for referring Mallory David to me for evaluation. Attached you will find relevant portions of my assessment and plan of care.    If you have questions, please do not hesitate to call me. I look forward to following Mallory David along with you.    Sincerely,    Anthony Graham MD    Enclosure  CC:  No Recipients    If you would like to receive this communication electronically, please contact [email or web address] or [phone] to request more information on [employer] Link access.    For providers and/or their staff who would like to refer a patient to [facility], please contact us through our one-stop-shop provider referral line, [facility], at [phone].    If you feel you have received this communication in error or would no longer like to receive these types of communications, please e-mail [email or web address]

## 2018-10-02 ENCOUNTER — PATIENT MESSAGE (OUTPATIENT)
Dept: FAMILY MEDICINE | Facility: CLINIC | Age: 63
End: 2018-10-02

## 2018-10-02 DIAGNOSIS — Z12.39 SCREENING BREAST EXAMINATION: Primary | ICD-10-CM

## 2018-10-11 ENCOUNTER — HOSPITAL ENCOUNTER (OUTPATIENT)
Dept: RADIOLOGY | Facility: HOSPITAL | Age: 63
Discharge: HOME | End: 2018-10-11
Attending: FAMILY MEDICINE
Payer: COMMERCIAL

## 2018-10-11 ENCOUNTER — CLINICAL SUPPORT (OUTPATIENT)
Dept: FAMILY MEDICINE | Facility: CLINIC | Age: 63
End: 2018-10-11
Payer: COMMERCIAL

## 2018-10-11 VITALS — WEIGHT: 164.69 LBS | BODY MASS INDEX: 30.31 KG/M2 | HEIGHT: 62 IN

## 2018-10-11 DIAGNOSIS — Z12.39 SCREENING BREAST EXAMINATION: ICD-10-CM

## 2018-10-11 DIAGNOSIS — Z23 NEED FOR PROPHYLACTIC VACCINATION AND INOCULATION AGAINST INFLUENZA: Primary | ICD-10-CM

## 2018-10-11 PROCEDURE — 90471 IMMUNIZATION ADMIN: CPT | Mod: S$GLB,,, | Performed by: FAMILY MEDICINE

## 2018-10-11 PROCEDURE — 77063 BREAST TOMOSYNTHESIS BI: CPT | Mod: TC,PO

## 2018-10-11 PROCEDURE — 77063 BREAST TOMOSYNTHESIS BI: CPT | Mod: 26,,, | Performed by: RADIOLOGY

## 2018-10-11 PROCEDURE — 90686 IIV4 VACC NO PRSV 0.5 ML IM: CPT | Mod: S$GLB,,, | Performed by: FAMILY MEDICINE

## 2018-10-11 PROCEDURE — 77067 SCR MAMMO BI INCL CAD: CPT | Mod: 26,,, | Performed by: RADIOLOGY

## 2018-10-11 PROCEDURE — 77067 SCR MAMMO BI INCL CAD: CPT | Mod: TC,PO

## 2018-11-27 DIAGNOSIS — I10 ESSENTIAL HYPERTENSION: ICD-10-CM

## 2018-11-28 RX ORDER — POTASSIUM CHLORIDE 20 MEQ/1
20 TABLET, EXTENDED RELEASE ORAL DAILY
Qty: 30 TABLET | Refills: 8 | Status: SHIPPED | OUTPATIENT
Start: 2018-11-28 | End: 2019-08-28 | Stop reason: SDUPTHER

## 2019-01-15 ENCOUNTER — OFFICE VISIT (OUTPATIENT)
Dept: CARDIOLOGY | Facility: CLINIC | Age: 64
End: 2019-01-15
Payer: COMMERCIAL

## 2019-01-15 VITALS
BODY MASS INDEX: 32.01 KG/M2 | HEART RATE: 69 BPM | DIASTOLIC BLOOD PRESSURE: 69 MMHG | SYSTOLIC BLOOD PRESSURE: 133 MMHG | HEIGHT: 62 IN | WEIGHT: 173.94 LBS

## 2019-01-15 DIAGNOSIS — Q21.11 OSTIUM SECUNDUM TYPE ATRIAL SEPTAL DEFECT: ICD-10-CM

## 2019-01-15 DIAGNOSIS — I47.10 PAROXYSMAL SUPRAVENTRICULAR TACHYCARDIA: Primary | ICD-10-CM

## 2019-01-15 DIAGNOSIS — I10 ESSENTIAL HYPERTENSION: ICD-10-CM

## 2019-01-15 DIAGNOSIS — E78.00 PURE HYPERCHOLESTEROLEMIA: ICD-10-CM

## 2019-01-15 PROCEDURE — 3008F BODY MASS INDEX DOCD: CPT | Mod: CPTII,S$GLB,, | Performed by: INTERNAL MEDICINE

## 2019-01-15 PROCEDURE — 3075F PR MOST RECENT SYSTOLIC BLOOD PRESS GE 130-139MM HG: ICD-10-PCS | Mod: CPTII,S$GLB,, | Performed by: INTERNAL MEDICINE

## 2019-01-15 PROCEDURE — 99214 PR OFFICE/OUTPT VISIT, EST, LEVL IV, 30-39 MIN: ICD-10-PCS | Mod: S$GLB,,, | Performed by: INTERNAL MEDICINE

## 2019-01-15 PROCEDURE — 3008F PR BODY MASS INDEX (BMI) DOCUMENTED: ICD-10-PCS | Mod: CPTII,S$GLB,, | Performed by: INTERNAL MEDICINE

## 2019-01-15 PROCEDURE — 99214 OFFICE O/P EST MOD 30 MIN: CPT | Mod: S$GLB,,, | Performed by: INTERNAL MEDICINE

## 2019-01-15 PROCEDURE — 3078F PR MOST RECENT DIASTOLIC BLOOD PRESSURE < 80 MM HG: ICD-10-PCS | Mod: CPTII,S$GLB,, | Performed by: INTERNAL MEDICINE

## 2019-01-15 PROCEDURE — 3075F SYST BP GE 130 - 139MM HG: CPT | Mod: CPTII,S$GLB,, | Performed by: INTERNAL MEDICINE

## 2019-01-15 PROCEDURE — 99999 PR PBB SHADOW E&M-EST. PATIENT-LVL III: ICD-10-PCS | Mod: PBBFAC,,, | Performed by: INTERNAL MEDICINE

## 2019-01-15 PROCEDURE — 99999 PR PBB SHADOW E&M-EST. PATIENT-LVL III: CPT | Mod: PBBFAC,,, | Performed by: INTERNAL MEDICINE

## 2019-01-15 PROCEDURE — 3078F DIAST BP <80 MM HG: CPT | Mod: CPTII,S$GLB,, | Performed by: INTERNAL MEDICINE

## 2019-01-15 NOTE — PROGRESS NOTES
Subjective:   Patient ID:  Mallorychiara David is a 63 y.o. female who presents for follow-up of Follow-up  Patient denies CP, angina or anginal equivalent.Pt with BUE pain. Stress ysabel and echo 1-18 wnl.  Ablation- 2011, Dr. Loya  Hypertension   This is a chronic problem. The current episode started more than 1 year ago. The problem has been gradually improving since onset. The problem is controlled. Pertinent negatives include no chest pain, palpitations or shortness of breath. Past treatments include beta blockers and diuretics. The current treatment provides moderate improvement. There are no compliance problems.    Hyperlipidemia   This is a chronic problem. The current episode started more than 1 year ago. The problem is controlled. Recent lipid tests were reviewed and are variable. Pertinent negatives include no chest pain or shortness of breath. Current antihyperlipidemic treatment includes statins. The current treatment provides moderate improvement of lipids. There are no compliance problems.        Review of Systems   Constitution: Negative. Negative for weight gain.   HENT: Negative.    Eyes: Negative.    Cardiovascular: Negative.  Negative for chest pain, leg swelling and palpitations.   Respiratory: Negative.  Negative for shortness of breath.    Endocrine: Negative.    Hematologic/Lymphatic: Negative.    Skin: Negative.    Musculoskeletal: Negative for muscle weakness.   Gastrointestinal: Negative.    Genitourinary: Negative.    Neurological: Negative.  Negative for dizziness.   Psychiatric/Behavioral: Negative.    Allergic/Immunologic: Negative.      Family History   Problem Relation Age of Onset    Hypertension Mother     Cancer Father     Diabetes Father     Breast cancer Sister     Aneurysm Brother         this was of his heart.     Past Medical History:   Diagnosis Date    *Atrial flutter     s/p ablation    Abnormal Pap smear of cervix     ASD (atrial septal defect), ostium secundum      repaired    Benign hypertensive heart disease without heart failure     Endometriosis     Fatty liver     Hyperlipidemia     Hypertension     2009    Nodular goiter (nontoxic)     1999    Special screening for malignant neoplasms, colon 10/28/2014     Current Outpatient Medications on File Prior to Visit   Medication Sig Dispense Refill    ANTIOX #11/OM3/DHA/EPA/LUT/NAMAN (50+ ADULT EYE HEALTH ORAL) Take by mouth.      aspirin 81 MG chewable tablet Take 81 mg by mouth once daily.        cetirizine (ZYRTEC) 10 MG tablet Take 10 mg by mouth once daily.      hydroCHLOROthiazide (HYDRODIURIL) 25 MG tablet Take 1 tablet (25 mg total) by mouth every morning. 90 tablet 3    levothyroxine (SYNTHROID) 112 MCG tablet Take 1 tablet (112 mcg total) by mouth once daily. 90 tablet 3    metoprolol succinate (TOPROL-XL) 50 MG 24 hr tablet Take 1 tablet (50 mg total) by mouth once daily. 90 tablet 3    potassium chloride SA (K-DUR,KLOR-CON) 20 MEQ tablet Take 1 tablet (20 mEq total) by mouth once daily. 30 tablet 8    rosuvastatin (CRESTOR) 10 MG tablet Take 1 tablet (10 mg total) by mouth once daily. 90 tablet 3     No current facility-administered medications on file prior to visit.      Review of patient's allergies indicates:   Allergen Reactions    No known drug allergies        Objective:     Physical Exam   Constitutional: She is oriented to person, place, and time. She appears well-developed and well-nourished.   HENT:   Head: Normocephalic and atraumatic.   Eyes: Conjunctivae and EOM are normal. Pupils are equal, round, and reactive to light.   Neck: Normal range of motion. Neck supple.   Cardiovascular: Normal rate, regular rhythm, normal heart sounds and intact distal pulses.   Pulmonary/Chest: Effort normal and breath sounds normal.   Abdominal: Soft. Bowel sounds are normal.   Musculoskeletal: Normal range of motion.   Neurological: She is alert and oriented to person, place, and time.   Skin: Skin is warm  and dry.   Psychiatric: She has a normal mood and affect.   Nursing note and vitals reviewed.      Assessment:     1. Paroxysmal supraventricular tachycardia    2. Ostium secundum type atrial septal defect    3. Essential hypertension    4. Pure hypercholesterolemia        Plan:     Paroxysmal supraventricular tachycardia    Ostium secundum type atrial septal defect    Essential hypertension    Pure hypercholesterolemia    continue metoprolol-hx of SVT/ablation  Continue metoprolol, hctz-htn  Continue statin-hlp

## 2019-02-12 ENCOUNTER — OFFICE VISIT (OUTPATIENT)
Dept: FAMILY MEDICINE | Facility: CLINIC | Age: 64
End: 2019-02-12
Payer: COMMERCIAL

## 2019-02-12 VITALS
DIASTOLIC BLOOD PRESSURE: 77 MMHG | SYSTOLIC BLOOD PRESSURE: 123 MMHG | HEART RATE: 81 BPM | BODY MASS INDEX: 31.8 KG/M2 | HEIGHT: 62 IN | WEIGHT: 172.81 LBS | TEMPERATURE: 99 F

## 2019-02-12 DIAGNOSIS — J32.9 SINUSITIS, UNSPECIFIED CHRONICITY, UNSPECIFIED LOCATION: Primary | ICD-10-CM

## 2019-02-12 PROCEDURE — 99213 PR OFFICE/OUTPT VISIT, EST, LEVL III, 20-29 MIN: ICD-10-PCS | Mod: 25,S$GLB,, | Performed by: NURSE PRACTITIONER

## 2019-02-12 PROCEDURE — 99999 PR PBB SHADOW E&M-EST. PATIENT-LVL IV: ICD-10-PCS | Mod: PBBFAC,,, | Performed by: NURSE PRACTITIONER

## 2019-02-12 PROCEDURE — 96372 THER/PROPH/DIAG INJ SC/IM: CPT | Mod: S$GLB,,, | Performed by: NURSE PRACTITIONER

## 2019-02-12 PROCEDURE — 99213 OFFICE O/P EST LOW 20 MIN: CPT | Mod: 25,S$GLB,, | Performed by: NURSE PRACTITIONER

## 2019-02-12 PROCEDURE — 3074F PR MOST RECENT SYSTOLIC BLOOD PRESSURE < 130 MM HG: ICD-10-PCS | Mod: CPTII,S$GLB,, | Performed by: NURSE PRACTITIONER

## 2019-02-12 PROCEDURE — 3008F PR BODY MASS INDEX (BMI) DOCUMENTED: ICD-10-PCS | Mod: CPTII,S$GLB,, | Performed by: NURSE PRACTITIONER

## 2019-02-12 PROCEDURE — 3078F DIAST BP <80 MM HG: CPT | Mod: CPTII,S$GLB,, | Performed by: NURSE PRACTITIONER

## 2019-02-12 PROCEDURE — 3078F PR MOST RECENT DIASTOLIC BLOOD PRESSURE < 80 MM HG: ICD-10-PCS | Mod: CPTII,S$GLB,, | Performed by: NURSE PRACTITIONER

## 2019-02-12 PROCEDURE — 96372 PR INJECTION,THERAP/PROPH/DIAG2ST, IM OR SUBCUT: ICD-10-PCS | Mod: S$GLB,,, | Performed by: NURSE PRACTITIONER

## 2019-02-12 PROCEDURE — 3008F BODY MASS INDEX DOCD: CPT | Mod: CPTII,S$GLB,, | Performed by: NURSE PRACTITIONER

## 2019-02-12 PROCEDURE — 99999 PR PBB SHADOW E&M-EST. PATIENT-LVL IV: CPT | Mod: PBBFAC,,, | Performed by: NURSE PRACTITIONER

## 2019-02-12 PROCEDURE — 3074F SYST BP LT 130 MM HG: CPT | Mod: CPTII,S$GLB,, | Performed by: NURSE PRACTITIONER

## 2019-02-12 RX ORDER — DEXAMETHASONE SODIUM PHOSPHATE 4 MG/ML
8 INJECTION, SOLUTION INTRA-ARTICULAR; INTRALESIONAL; INTRAMUSCULAR; INTRAVENOUS; SOFT TISSUE
Status: COMPLETED | OUTPATIENT
Start: 2019-02-12 | End: 2019-02-12

## 2019-02-12 RX ORDER — FLUTICASONE PROPIONATE 50 MCG
SPRAY, SUSPENSION (ML) NASAL
Qty: 1 BOTTLE | Refills: 0 | Status: SHIPPED | OUTPATIENT
Start: 2019-02-12 | End: 2019-08-28

## 2019-02-12 RX ORDER — CEPHALEXIN 500 MG/1
500 CAPSULE ORAL EVERY 12 HOURS
Qty: 14 CAPSULE | Refills: 0 | Status: SHIPPED | OUTPATIENT
Start: 2019-02-12 | End: 2019-02-19

## 2019-02-12 RX ORDER — FLUTICASONE PROPIONATE 50 MCG
SPRAY, SUSPENSION (ML) NASAL
COMMUNITY
End: 2019-02-12 | Stop reason: SDUPTHER

## 2019-02-12 RX ADMIN — DEXAMETHASONE SODIUM PHOSPHATE 8 MG: 4 INJECTION, SOLUTION INTRA-ARTICULAR; INTRALESIONAL; INTRAMUSCULAR; INTRAVENOUS; SOFT TISSUE at 08:02

## 2019-02-12 NOTE — PROGRESS NOTES
Subjective:       Patient ID: Mallorykamilah David is a 63 y.o. female.    Chief Complaint: Cough and Sinus Problem    Sinus Problem   This is a new problem. The current episode started 1 to 4 weeks ago. The problem is unchanged. There has been no fever. The pain is moderate. Associated symptoms include congestion, headaches, sinus pressure and a sore throat. Pertinent negatives include no chills, coughing, diaphoresis, ear pain, hoarse voice, neck pain, shortness of breath, sneezing or swollen glands. Treatments tried: mucinex. The treatment provided no relief.     Past Medical History:   Diagnosis Date    *Atrial flutter     s/p ablation    Abnormal Pap smear of cervix     ASD (atrial septal defect), ostium secundum     repaired    Benign hypertensive heart disease without heart failure     Endometriosis     Fatty liver     Hyperlipidemia     Hypertension     2009    Nodular goiter (nontoxic)     1999    Special screening for malignant neoplasms, colon 10/28/2014     Social History     Socioeconomic History    Marital status:      Spouse name: Alexi    Number of children: 2    Years of education: Not on file    Highest education level: Not on file   Social Needs    Financial resource strain: Not on file    Food insecurity - worry: Not on file    Food insecurity - inability: Not on file    Transportation needs - medical: Not on file    Transportation needs - non-medical: Not on file   Occupational History    Occupation:      Employer: [employer]   Tobacco Use    Smoking status: Never Smoker    Smokeless tobacco: Never Used   Substance and Sexual Activity    Alcohol use: No    Drug use: No    Sexual activity: Yes   Other Topics Concern    Not on file   Social History Narrative    Not on file     Past Surgical History:   Procedure Laterality Date    ABLATION OF DYSRHYTHMIC FOCUS      ASD REPAIR, SECUNDUM      CARPAL TUNNEL RELEASE  2007    right hand    COLONOSCOPY  N/A 10/28/2014    Performed by [facility]ll [facility] at [facility]    Heart ablation  2011    OOPHORECTOMY      Open Heart Surgery  1970    OVARIAN CYST SURGERY  1981       Review of Systems   Constitutional: Negative.  Negative for chills and diaphoresis.   HENT: Positive for congestion, postnasal drip, rhinorrhea, sinus pressure, sinus pain and sore throat. Negative for ear pain, hoarse voice and sneezing.    Eyes: Negative.    Respiratory: Negative.  Negative for cough and shortness of breath.    Cardiovascular: Negative.    Gastrointestinal: Negative.    Endocrine: Negative.    Genitourinary: Negative.    Musculoskeletal: Negative.  Negative for neck pain.   Skin: Negative.    Allergic/Immunologic: Negative.    Neurological: Positive for headaches.   Psychiatric/Behavioral: Negative.        Objective:      Physical Exam   Constitutional: She is oriented to person, place, and time. She appears well-developed and well-nourished.   HENT:   Head: Normocephalic.   Right Ear: Hearing, tympanic membrane, external ear and ear canal normal.   Left Ear: Hearing, tympanic membrane, external ear and ear canal normal.   Nose: Mucosal edema and rhinorrhea present. Right sinus exhibits maxillary sinus tenderness. Right sinus exhibits no frontal sinus tenderness. Left sinus exhibits maxillary sinus tenderness. Left sinus exhibits no frontal sinus tenderness.   Mouth/Throat: Uvula is midline, oropharynx is clear and moist and mucous membranes are normal.   Eyes: Conjunctivae are normal. Pupils are equal, round, and reactive to light.   Neck: Normal range of motion. Neck supple.   Cardiovascular: Normal rate, regular rhythm and normal heart sounds.   Pulmonary/Chest: Effort normal and breath sounds normal.   Abdominal: Soft. Bowel sounds are normal.   Musculoskeletal: Normal range of motion.   Neurological: She is alert and oriented to person, place, and time.   Skin: Skin is warm and dry. Capillary refill takes 2 to 3 seconds.    Psychiatric: She has a normal mood and affect. Her behavior is normal. Judgment and thought content normal.   Nursing note and vitals reviewed.      Assessment:       1. Sinusitis, unspecified chronicity, unspecified location        Plan:           Mallory was seen today for cough and sinus problem.    Diagnoses and all orders for this visit:    Sinusitis, unspecified chronicity, unspecified location  -     cephALEXin (KEFLEX) 500 MG capsule; Take 1 capsule (500 mg total) by mouth every 12 (twelve) hours. for 7 days  -     dexamethasone injection 8 mg  -     fluticasone (FLONASE) 50 mcg/actuation nasal spray; Flonase 50 mcg/actuation nasal spray,suspension   Inhale 1 spray every day by intranasal route.  Mucinex OTC as directed (no D or DM)  Hydrate well  Report to ER immediately if symptoms worsen

## 2019-03-07 ENCOUNTER — TELEPHONE (OUTPATIENT)
Dept: CARDIOLOGY | Facility: CLINIC | Age: 64
End: 2019-03-07

## 2019-03-07 NOTE — TELEPHONE ENCOUNTER
Called and left v/m that Dr Graham scheduled changed ( book out 07/16). We moved her appt to another day while their was still an opening. New date and time is 07/31 at 3 pm. Please call if this will not work for you. I appreciate your understanding. [email or web address]

## 2019-03-18 DIAGNOSIS — E78.5 HYPERLIPIDEMIA, UNSPECIFIED HYPERLIPIDEMIA TYPE: ICD-10-CM

## 2019-03-18 RX ORDER — ROSUVASTATIN CALCIUM 10 MG/1
10 TABLET, COATED ORAL DAILY
Qty: 90 TABLET | Refills: 3 | Status: SHIPPED | OUTPATIENT
Start: 2019-03-18 | End: 2019-08-28 | Stop reason: SDUPTHER

## 2019-06-17 DIAGNOSIS — I10 ESSENTIAL HYPERTENSION: ICD-10-CM

## 2019-06-17 RX ORDER — HYDROCHLOROTHIAZIDE 25 MG/1
25 TABLET ORAL EVERY MORNING
Qty: 90 TABLET | Refills: 3 | Status: SHIPPED | OUTPATIENT
Start: 2019-06-17 | End: 2019-07-31

## 2019-06-25 DIAGNOSIS — I10 ESSENTIAL HYPERTENSION: ICD-10-CM

## 2019-06-25 RX ORDER — LEVOTHYROXINE SODIUM 112 UG/1
112 TABLET ORAL DAILY
Qty: 90 TABLET | Refills: 3 | Status: SHIPPED | OUTPATIENT
Start: 2019-06-25 | End: 2019-08-28

## 2019-07-24 DIAGNOSIS — I10 HYPERTENSION, UNSPECIFIED TYPE: Primary | ICD-10-CM

## 2019-07-31 ENCOUNTER — LAB VISIT (OUTPATIENT)
Dept: LAB | Facility: HOSPITAL | Age: 64
End: 2019-07-31
Attending: INTERNAL MEDICINE
Payer: COMMERCIAL

## 2019-07-31 ENCOUNTER — OFFICE VISIT (OUTPATIENT)
Dept: CARDIOLOGY | Facility: CLINIC | Age: 64
End: 2019-07-31
Payer: COMMERCIAL

## 2019-07-31 ENCOUNTER — CLINICAL SUPPORT (OUTPATIENT)
Dept: CARDIOLOGY | Facility: CLINIC | Age: 64
End: 2019-07-31
Payer: COMMERCIAL

## 2019-07-31 VITALS
SYSTOLIC BLOOD PRESSURE: 138 MMHG | HEIGHT: 62 IN | BODY MASS INDEX: 32.11 KG/M2 | HEART RATE: 80 BPM | DIASTOLIC BLOOD PRESSURE: 82 MMHG | WEIGHT: 174.5 LBS

## 2019-07-31 DIAGNOSIS — R60.1 GENERALIZED EDEMA: ICD-10-CM

## 2019-07-31 DIAGNOSIS — E78.00 PURE HYPERCHOLESTEROLEMIA: ICD-10-CM

## 2019-07-31 DIAGNOSIS — Q21.11 OSTIUM SECUNDUM TYPE ATRIAL SEPTAL DEFECT: Primary | ICD-10-CM

## 2019-07-31 DIAGNOSIS — I10 ESSENTIAL HYPERTENSION: ICD-10-CM

## 2019-07-31 DIAGNOSIS — I10 HYPERTENSION, UNSPECIFIED TYPE: ICD-10-CM

## 2019-07-31 DIAGNOSIS — I47.10 PAROXYSMAL SUPRAVENTRICULAR TACHYCARDIA: ICD-10-CM

## 2019-07-31 LAB — TSH SERPL DL<=0.005 MIU/L-ACNC: 0.42 UIU/ML (ref 0.4–4)

## 2019-07-31 PROCEDURE — 99999 PR PBB SHADOW E&M-EST. PATIENT-LVL III: ICD-10-PCS | Mod: PBBFAC,,, | Performed by: INTERNAL MEDICINE

## 2019-07-31 PROCEDURE — 99214 PR OFFICE/OUTPT VISIT, EST, LEVL IV, 30-39 MIN: ICD-10-PCS | Mod: S$GLB,,, | Performed by: INTERNAL MEDICINE

## 2019-07-31 PROCEDURE — 3075F PR MOST RECENT SYSTOLIC BLOOD PRESS GE 130-139MM HG: ICD-10-PCS | Mod: CPTII,S$GLB,, | Performed by: INTERNAL MEDICINE

## 2019-07-31 PROCEDURE — 99214 OFFICE O/P EST MOD 30 MIN: CPT | Mod: S$GLB,,, | Performed by: INTERNAL MEDICINE

## 2019-07-31 PROCEDURE — 36415 COLL VENOUS BLD VENIPUNCTURE: CPT | Mod: PO

## 2019-07-31 PROCEDURE — 3008F BODY MASS INDEX DOCD: CPT | Mod: CPTII,S$GLB,, | Performed by: INTERNAL MEDICINE

## 2019-07-31 PROCEDURE — 3075F SYST BP GE 130 - 139MM HG: CPT | Mod: CPTII,S$GLB,, | Performed by: INTERNAL MEDICINE

## 2019-07-31 PROCEDURE — 93000 EKG 12-LEAD: ICD-10-PCS | Mod: S$GLB,,, | Performed by: INTERNAL MEDICINE

## 2019-07-31 PROCEDURE — 3008F PR BODY MASS INDEX (BMI) DOCUMENTED: ICD-10-PCS | Mod: CPTII,S$GLB,, | Performed by: INTERNAL MEDICINE

## 2019-07-31 PROCEDURE — 84443 ASSAY THYROID STIM HORMONE: CPT

## 2019-07-31 PROCEDURE — 3079F DIAST BP 80-89 MM HG: CPT | Mod: CPTII,S$GLB,, | Performed by: INTERNAL MEDICINE

## 2019-07-31 PROCEDURE — 99999 PR PBB SHADOW E&M-EST. PATIENT-LVL III: CPT | Mod: PBBFAC,,, | Performed by: INTERNAL MEDICINE

## 2019-07-31 PROCEDURE — 3079F PR MOST RECENT DIASTOLIC BLOOD PRESSURE 80-89 MM HG: ICD-10-PCS | Mod: CPTII,S$GLB,, | Performed by: INTERNAL MEDICINE

## 2019-07-31 PROCEDURE — 93000 ELECTROCARDIOGRAM COMPLETE: CPT | Mod: S$GLB,,, | Performed by: INTERNAL MEDICINE

## 2019-07-31 RX ORDER — FUROSEMIDE 40 MG/1
40 TABLET ORAL 2 TIMES DAILY
Qty: 30 TABLET | Refills: 11 | Status: SHIPPED | OUTPATIENT
Start: 2019-07-31 | End: 2019-07-31 | Stop reason: SDUPTHER

## 2019-07-31 NOTE — PROGRESS NOTES
Subjective:   Patient ID:  Mallorychiara David is a 63 y.o. female who presents for follow-up of Follow-up; Leg Swelling; and Foot Swelling  Pt with ankle edema x 1 month.Patient denies CP, angina or anginal equivalent.Pt with some weight gain.    Hypertension   This is a chronic problem. The current episode started more than 1 year ago. The problem has been gradually improving since onset. The problem is controlled. Pertinent negatives include no chest pain, palpitations or shortness of breath. Past treatments include diuretics. The current treatment provides moderate improvement. There are no compliance problems.    Hyperlipidemia   This is a chronic problem. The current episode started more than 1 year ago. The problem is controlled. Pertinent negatives include no chest pain or shortness of breath. Current antihyperlipidemic treatment includes statins. The current treatment provides moderate improvement of lipids. There are no compliance problems.        Review of Systems   Constitution: Negative. Negative for weight gain.   HENT: Negative.    Eyes: Negative.    Cardiovascular: Negative.  Negative for chest pain, leg swelling and palpitations.   Respiratory: Negative.  Negative for shortness of breath.    Endocrine: Negative.    Hematologic/Lymphatic: Negative.    Skin: Negative.    Musculoskeletal: Negative for muscle weakness.   Gastrointestinal: Negative.    Genitourinary: Negative.    Neurological: Negative.  Negative for dizziness.   Psychiatric/Behavioral: Negative.    Allergic/Immunologic: Negative.      Family History   Problem Relation Age of Onset    Hypertension Mother     Cancer Father     Diabetes Father     Breast cancer Sister     Aneurysm Brother         this was of his heart.     Past Medical History:   Diagnosis Date    *Atrial flutter     s/p ablation    Abnormal Pap smear of cervix     ASD (atrial septal defect), ostium secundum     repaired    Benign hypertensive heart disease without  heart failure     Endometriosis     Fatty liver     Hyperlipidemia     Hypertension     2009    Nodular goiter (nontoxic)     1999    Special screening for malignant neoplasms, colon 10/28/2014     Social History     Socioeconomic History    Marital status:      Spouse name: Alexi    Number of children: 2    Years of education: Not on file    Highest education level: Not on file   Occupational History    Occupation:      Employer: [employer]   Social Needs    Financial resource strain: Not on file    Food insecurity:     Worry: Not on file     Inability: Not on file    Transportation needs:     Medical: Not on file     Non-medical: Not on file   Tobacco Use    Smoking status: Never Smoker    Smokeless tobacco: Never Used   Substance and Sexual Activity    Alcohol use: No    Drug use: No    Sexual activity: Yes   Lifestyle    Physical activity:     Days per week: Not on file     Minutes per session: Not on file    Stress: Not on file   Relationships    Social connections:     Talks on phone: Not on file     Gets together: Not on file     Attends Cheondo[employer] service: Not on file     Active member of club or organization: Not on file     Attends meetings of clubs or organizations: Not on file     Relationship status: Not on file   Other Topics Concern    Not on file   Social History Narrative    Not on file     Current Outpatient Medications on File Prior to Visit   Medication Sig Dispense Refill    ANTIOX #11/OM3/DHA/EPA/LUT/NAMAN (50+ ADULT EYE HEALTH ORAL) Take by mouth.      aspirin 81 MG chewable tablet Take 81 mg by mouth once daily.        cetirizine (ZYRTEC) 10 mg Cap Zyrtec   1 po q day      fluticasone (FLONASE) 50 mcg/actuation nasal spray Flonase 50 mcg/actuation nasal spray,suspension   Inhale 1 spray every day by intranasal route. 1 Bottle 0    hydroCHLOROthiazide (HYDRODIURIL) 25 MG tablet Take 1 tablet (25 mg total) by mouth every morning. 90 tablet 3     levothyroxine (SYNTHROID) 112 MCG tablet Take 1 tablet (112 mcg total) by mouth once daily. 90 tablet 3    MAGNESIUM ORAL Take by mouth once daily.      metoprolol succinate (TOPROL-XL) 50 MG 24 hr tablet Take 1 tablet (50 mg total) by mouth once daily. 90 tablet 3    potassium chloride SA (K-DUR,KLOR-CON) 20 MEQ tablet Take 1 tablet (20 mEq total) by mouth once daily. 30 tablet 8    rosuvastatin (CRESTOR) 10 MG tablet Take 1 tablet (10 mg total) by mouth once daily. 90 tablet 3     No current facility-administered medications on file prior to visit.      Review of patient's allergies indicates:   Allergen Reactions    No known drug allergies        Objective:     Physical Exam   Constitutional: She is oriented to person, place, and time. She appears well-developed and well-nourished.   HENT:   Head: Normocephalic and atraumatic.   Eyes: Pupils are equal, round, and reactive to light. Conjunctivae and EOM are normal.   Neck: Normal range of motion. Neck supple.   Cardiovascular: Normal rate, regular rhythm, normal heart sounds and intact distal pulses.   Pulmonary/Chest: Effort normal and breath sounds normal.   Abdominal: Soft. Bowel sounds are normal.   Musculoskeletal: Normal range of motion.   Neurological: She is alert and oriented to person, place, and time.   Skin: Skin is warm and dry.   Psychiatric: She has a normal mood and affect.   Nursing note and vitals reviewed.      Assessment:     1. Ostium secundum type atrial septal defect    2. Paroxysmal supraventricular tachycardia    3. Essential hypertension    4. Pure hypercholesterolemia        Plan:     Ostium secundum type atrial septal defect    Paroxysmal supraventricular tachycardia    Essential hypertension    Pure hypercholesterolemia    continue metoprolol-hx of SVT/ablation  Continue metoprolol, hctz-htn  Continue statin-hlp

## 2019-08-01 RX ORDER — FUROSEMIDE 40 MG/1
40 TABLET ORAL 2 TIMES DAILY
Qty: 60 TABLET | Refills: 11 | Status: SHIPPED | OUTPATIENT
Start: 2019-08-01 | End: 2019-08-28 | Stop reason: SDUPTHER

## 2019-08-07 ENCOUNTER — PATIENT MESSAGE (OUTPATIENT)
Dept: FAMILY MEDICINE | Facility: CLINIC | Age: 64
End: 2019-08-07

## 2019-08-07 DIAGNOSIS — E03.9 HYPOTHYROIDISM, UNSPECIFIED TYPE: ICD-10-CM

## 2019-08-07 DIAGNOSIS — I10 ESSENTIAL HYPERTENSION: Primary | ICD-10-CM

## 2019-08-07 DIAGNOSIS — E78.5 HYPERLIPIDEMIA, UNSPECIFIED HYPERLIPIDEMIA TYPE: ICD-10-CM

## 2019-08-07 NOTE — TELEPHONE ENCOUNTER
Health Maintenance Due   Topic Date Due    Pap Smear with HPV Cotest  05/02/2019     I have signed for the following orders AND/OR meds.  Please call the patient and ask the patient to schedule the testing AND/OR inform about any medications that were sent.      Orders Placed This Encounter   Procedures    TSH     Standing Status:   Future     Standing Expiration Date:   8/6/2020    Comprehensive metabolic panel     Standing Status:   Future     Standing Expiration Date:   8/7/2020    Lipid panel     Standing Status:   Future     Standing Expiration Date:   8/7/2020

## 2019-08-19 DIAGNOSIS — I47.10 PAROXYSMAL SUPRAVENTRICULAR TACHYCARDIA: ICD-10-CM

## 2019-08-19 DIAGNOSIS — I10 ESSENTIAL HYPERTENSION: ICD-10-CM

## 2019-08-19 RX ORDER — METOPROLOL SUCCINATE 50 MG/1
50 TABLET, EXTENDED RELEASE ORAL DAILY
Qty: 90 TABLET | Refills: 3 | Status: SHIPPED | OUTPATIENT
Start: 2019-08-19 | End: 2019-08-20 | Stop reason: SDUPTHER

## 2019-08-20 ENCOUNTER — PATIENT MESSAGE (OUTPATIENT)
Dept: FAMILY MEDICINE | Facility: CLINIC | Age: 64
End: 2019-08-20

## 2019-08-20 DIAGNOSIS — I47.10 PAROXYSMAL SUPRAVENTRICULAR TACHYCARDIA: ICD-10-CM

## 2019-08-20 DIAGNOSIS — I10 ESSENTIAL HYPERTENSION: ICD-10-CM

## 2019-08-20 RX ORDER — METOPROLOL SUCCINATE 50 MG/1
50 TABLET, EXTENDED RELEASE ORAL DAILY
Qty: 90 TABLET | Refills: 0 | Status: SHIPPED | OUTPATIENT
Start: 2019-08-20 | End: 2019-08-28 | Stop reason: SDUPTHER

## 2019-08-20 NOTE — TELEPHONE ENCOUNTER
The patient requested medicine refills and I did refill it once.    Health Maintenance Due   Topic Date Due    Pap Smear with HPV Cotest  05/02/2019    Mammogram  10/11/2019     BP Readings from Last 3 Encounters:   07/31/19 138/82   02/12/19 123/77   01/15/19 133/69

## 2019-08-26 ENCOUNTER — LAB VISIT (OUTPATIENT)
Dept: LAB | Facility: HOSPITAL | Age: 64
End: 2019-08-26
Attending: FAMILY MEDICINE
Payer: COMMERCIAL

## 2019-08-26 DIAGNOSIS — E78.5 HYPERLIPIDEMIA, UNSPECIFIED HYPERLIPIDEMIA TYPE: ICD-10-CM

## 2019-08-26 DIAGNOSIS — E03.9 HYPOTHYROIDISM, UNSPECIFIED TYPE: ICD-10-CM

## 2019-08-26 DIAGNOSIS — I10 ESSENTIAL HYPERTENSION: ICD-10-CM

## 2019-08-26 LAB
ALBUMIN SERPL BCP-MCNC: 4.4 G/DL (ref 3.5–5.2)
ALP SERPL-CCNC: 70 U/L (ref 55–135)
ALT SERPL W/O P-5'-P-CCNC: 16 U/L (ref 10–44)
ANION GAP SERPL CALC-SCNC: 11 MMOL/L (ref 8–16)
AST SERPL-CCNC: 20 U/L (ref 10–40)
BILIRUB SERPL-MCNC: 1.1 MG/DL (ref 0.1–1)
BUN SERPL-MCNC: 18 MG/DL (ref 8–23)
CALCIUM SERPL-MCNC: 10.2 MG/DL (ref 8.7–10.5)
CHLORIDE SERPL-SCNC: 103 MMOL/L (ref 95–110)
CHOLEST SERPL-MCNC: 154 MG/DL (ref 120–199)
CHOLEST/HDLC SERPL: 2.2 {RATIO} (ref 2–5)
CO2 SERPL-SCNC: 29 MMOL/L (ref 23–29)
CREAT SERPL-MCNC: 0.8 MG/DL (ref 0.5–1.4)
EST. GFR  (AFRICAN AMERICAN): >60 ML/MIN/1.73 M^2
EST. GFR  (NON AFRICAN AMERICAN): >60 ML/MIN/1.73 M^2
GLUCOSE SERPL-MCNC: 103 MG/DL (ref 70–110)
HDLC SERPL-MCNC: 69 MG/DL (ref 40–75)
HDLC SERPL: 44.8 % (ref 20–50)
LDLC SERPL CALC-MCNC: 68.4 MG/DL (ref 63–159)
NONHDLC SERPL-MCNC: 85 MG/DL
POTASSIUM SERPL-SCNC: 3.6 MMOL/L (ref 3.5–5.1)
PROT SERPL-MCNC: 7.5 G/DL (ref 6–8.4)
SODIUM SERPL-SCNC: 143 MMOL/L (ref 136–145)
T4 FREE SERPL-MCNC: 1.27 NG/DL (ref 0.71–1.51)
TRIGL SERPL-MCNC: 83 MG/DL (ref 30–150)
TSH SERPL DL<=0.005 MIU/L-ACNC: 0.24 UIU/ML (ref 0.4–4)

## 2019-08-26 PROCEDURE — 80053 COMPREHEN METABOLIC PANEL: CPT

## 2019-08-26 PROCEDURE — 84443 ASSAY THYROID STIM HORMONE: CPT

## 2019-08-26 PROCEDURE — 36415 COLL VENOUS BLD VENIPUNCTURE: CPT | Mod: PO

## 2019-08-26 PROCEDURE — 84439 ASSAY OF FREE THYROXINE: CPT

## 2019-08-26 PROCEDURE — 80061 LIPID PANEL: CPT

## 2019-08-28 ENCOUNTER — OFFICE VISIT (OUTPATIENT)
Dept: ORTHOPEDICS | Facility: CLINIC | Age: 64
End: 2019-08-28
Payer: COMMERCIAL

## 2019-08-28 ENCOUNTER — HOSPITAL ENCOUNTER (OUTPATIENT)
Dept: RADIOLOGY | Facility: HOSPITAL | Age: 64
Discharge: HOME | End: 2019-08-28
Attending: FAMILY MEDICINE
Payer: COMMERCIAL

## 2019-08-28 ENCOUNTER — HOSPITAL ENCOUNTER (OUTPATIENT)
Dept: RADIOLOGY | Facility: HOSPITAL | Age: 64
Discharge: HOME | End: 2019-08-28
Attending: NURSE PRACTITIONER
Payer: COMMERCIAL

## 2019-08-28 ENCOUNTER — OFFICE VISIT (OUTPATIENT)
Dept: FAMILY MEDICINE | Facility: CLINIC | Age: 64
End: 2019-08-28
Payer: COMMERCIAL

## 2019-08-28 VITALS
WEIGHT: 172 LBS | TEMPERATURE: 99 F | BODY MASS INDEX: 31.65 KG/M2 | HEIGHT: 62 IN | SYSTOLIC BLOOD PRESSURE: 124 MMHG | HEART RATE: 72 BPM | DIASTOLIC BLOOD PRESSURE: 73 MMHG

## 2019-08-28 VITALS
BODY MASS INDEX: 31.64 KG/M2 | SYSTOLIC BLOOD PRESSURE: 124 MMHG | HEART RATE: 72 BPM | WEIGHT: 171.94 LBS | HEIGHT: 62 IN | DIASTOLIC BLOOD PRESSURE: 73 MMHG

## 2019-08-28 VITALS
BODY MASS INDEX: 31.65 KG/M2 | TEMPERATURE: 99 F | SYSTOLIC BLOOD PRESSURE: 124 MMHG | DIASTOLIC BLOOD PRESSURE: 73 MMHG | HEIGHT: 62 IN | WEIGHT: 172 LBS | HEART RATE: 72 BPM

## 2019-08-28 DIAGNOSIS — Z00.00 ANNUAL PHYSICAL EXAM: Primary | ICD-10-CM

## 2019-08-28 DIAGNOSIS — Z12.4 ENCOUNTER FOR PAPANICOLAOU SMEAR FOR CERVICAL CANCER SCREENING: Primary | ICD-10-CM

## 2019-08-28 DIAGNOSIS — M25.571 CHRONIC PAIN OF RIGHT ANKLE: ICD-10-CM

## 2019-08-28 DIAGNOSIS — M76.60 ACHILLES TENDON PAIN: Primary | ICD-10-CM

## 2019-08-28 DIAGNOSIS — I47.10 PAROXYSMAL SUPRAVENTRICULAR TACHYCARDIA: ICD-10-CM

## 2019-08-28 DIAGNOSIS — M67.40 GANGLION: ICD-10-CM

## 2019-08-28 DIAGNOSIS — E78.5 HYPERLIPIDEMIA, UNSPECIFIED HYPERLIPIDEMIA TYPE: ICD-10-CM

## 2019-08-28 DIAGNOSIS — I10 ESSENTIAL HYPERTENSION: ICD-10-CM

## 2019-08-28 DIAGNOSIS — E05.00 TOXIC DIFFUSE GOITER: ICD-10-CM

## 2019-08-28 DIAGNOSIS — Z86.010 HISTORY OF COLON POLYPS: ICD-10-CM

## 2019-08-28 DIAGNOSIS — Z12.31 ENCOUNTER FOR SCREENING MAMMOGRAM FOR BREAST CANCER: ICD-10-CM

## 2019-08-28 DIAGNOSIS — M25.572 LEFT ANKLE PAIN, UNSPECIFIED CHRONICITY: Primary | ICD-10-CM

## 2019-08-28 DIAGNOSIS — G89.29 CHRONIC PAIN OF RIGHT ANKLE: ICD-10-CM

## 2019-08-28 DIAGNOSIS — E78.00 PURE HYPERCHOLESTEROLEMIA: ICD-10-CM

## 2019-08-28 DIAGNOSIS — M25.572 LEFT ANKLE PAIN, UNSPECIFIED CHRONICITY: ICD-10-CM

## 2019-08-28 PROCEDURE — 73610 XR ANKLE COMPLETE 3 VIEW LEFT: ICD-10-PCS | Mod: 26,LT,, | Performed by: RADIOLOGY

## 2019-08-28 PROCEDURE — 99999 PR PBB SHADOW E&M-EST. PATIENT-LVL III: CPT | Mod: PBBFAC,,, | Performed by: NURSE PRACTITIONER

## 2019-08-28 PROCEDURE — 99999 PR PBB SHADOW E&M-EST. PATIENT-LVL III: ICD-10-PCS | Mod: PBBFAC,,, | Performed by: NURSE PRACTITIONER

## 2019-08-28 PROCEDURE — 99213 OFFICE O/P EST LOW 20 MIN: CPT | Mod: 25,S$GLB,, | Performed by: NURSE PRACTITIONER

## 2019-08-28 PROCEDURE — 99396 PREV VISIT EST AGE 40-64: CPT | Mod: S$GLB,,, | Performed by: FAMILY MEDICINE

## 2019-08-28 PROCEDURE — 73610 X-RAY EXAM OF ANKLE: CPT | Mod: 26,LT,, | Performed by: RADIOLOGY

## 2019-08-28 PROCEDURE — 3078F PR MOST RECENT DIASTOLIC BLOOD PRESSURE < 80 MM HG: ICD-10-PCS | Mod: CPTII,S$GLB,, | Performed by: NURSE PRACTITIONER

## 2019-08-28 PROCEDURE — 3074F SYST BP LT 130 MM HG: CPT | Mod: CPTII,S$GLB,, | Performed by: FAMILY MEDICINE

## 2019-08-28 PROCEDURE — 99396 PR PREVENTIVE VISIT,EST,40-64: ICD-10-PCS | Mod: S$GLB,,, | Performed by: FAMILY MEDICINE

## 2019-08-28 PROCEDURE — 3008F BODY MASS INDEX DOCD: CPT | Mod: CPTII,S$GLB,, | Performed by: NURSE PRACTITIONER

## 2019-08-28 PROCEDURE — 3008F PR BODY MASS INDEX (BMI) DOCUMENTED: ICD-10-PCS | Mod: CPTII,S$GLB,, | Performed by: NURSE PRACTITIONER

## 2019-08-28 PROCEDURE — 73610 X-RAY EXAM OF ANKLE: CPT | Mod: TC,PO,LT

## 2019-08-28 PROCEDURE — 3074F SYST BP LT 130 MM HG: CPT | Mod: CPTII,S$GLB,, | Performed by: NURSE PRACTITIONER

## 2019-08-28 PROCEDURE — 76536 US SOFT TISSUE HEAD NECK THYROID: ICD-10-PCS | Mod: 26,,, | Performed by: RADIOLOGY

## 2019-08-28 PROCEDURE — 99999 PR PBB SHADOW E&M-EST. PATIENT-LVL III: CPT | Mod: PBBFAC,,, | Performed by: FAMILY MEDICINE

## 2019-08-28 PROCEDURE — 99999 PR PBB SHADOW E&M-EST. PATIENT-LVL III: ICD-10-PCS | Mod: PBBFAC,,, | Performed by: FAMILY MEDICINE

## 2019-08-28 PROCEDURE — 99213 PR OFFICE/OUTPT VISIT, EST, LEVL III, 20-29 MIN: ICD-10-PCS | Mod: 25,S$GLB,, | Performed by: NURSE PRACTITIONER

## 2019-08-28 PROCEDURE — 99243 PR OFFICE CONSULTATION,LEVEL III: ICD-10-PCS | Mod: S$GLB,,, | Performed by: NURSE PRACTITIONER

## 2019-08-28 PROCEDURE — 3074F PR MOST RECENT SYSTOLIC BLOOD PRESSURE < 130 MM HG: ICD-10-PCS | Mod: CPTII,S$GLB,, | Performed by: FAMILY MEDICINE

## 2019-08-28 PROCEDURE — 3078F DIAST BP <80 MM HG: CPT | Mod: CPTII,S$GLB,, | Performed by: FAMILY MEDICINE

## 2019-08-28 PROCEDURE — 88175 CYTOPATH C/V AUTO FLUID REDO: CPT

## 2019-08-28 PROCEDURE — 76536 US EXAM OF HEAD AND NECK: CPT | Mod: TC,PO

## 2019-08-28 PROCEDURE — 3074F PR MOST RECENT SYSTOLIC BLOOD PRESSURE < 130 MM HG: ICD-10-PCS | Mod: CPTII,S$GLB,, | Performed by: NURSE PRACTITIONER

## 2019-08-28 PROCEDURE — 87624 HPV HI-RISK TYP POOLED RSLT: CPT

## 2019-08-28 PROCEDURE — 3078F PR MOST RECENT DIASTOLIC BLOOD PRESSURE < 80 MM HG: ICD-10-PCS | Mod: CPTII,S$GLB,, | Performed by: FAMILY MEDICINE

## 2019-08-28 PROCEDURE — 3078F DIAST BP <80 MM HG: CPT | Mod: CPTII,S$GLB,, | Performed by: NURSE PRACTITIONER

## 2019-08-28 PROCEDURE — 76536 US EXAM OF HEAD AND NECK: CPT | Mod: 26,,, | Performed by: RADIOLOGY

## 2019-08-28 PROCEDURE — 99243 OFF/OP CNSLTJ NEW/EST LOW 30: CPT | Mod: S$GLB,,, | Performed by: NURSE PRACTITIONER

## 2019-08-28 RX ORDER — ROSUVASTATIN CALCIUM 10 MG/1
10 TABLET, COATED ORAL DAILY
Qty: 90 TABLET | Refills: 3 | Status: SHIPPED | OUTPATIENT
Start: 2019-08-28 | End: 2020-09-28 | Stop reason: SDUPTHER

## 2019-08-28 RX ORDER — POTASSIUM CHLORIDE 20 MEQ/1
20 TABLET, EXTENDED RELEASE ORAL DAILY
Qty: 90 TABLET | Refills: 3 | Status: SHIPPED | OUTPATIENT
Start: 2019-08-28 | End: 2020-09-28 | Stop reason: SDUPTHER

## 2019-08-28 RX ORDER — LEVOTHYROXINE SODIUM 100 UG/1
100 TABLET ORAL DAILY
Qty: 90 TABLET | Refills: 0 | Status: SHIPPED | OUTPATIENT
Start: 2019-08-28 | End: 2019-11-22 | Stop reason: SDUPTHER

## 2019-08-28 RX ORDER — METOPROLOL SUCCINATE 50 MG/1
50 TABLET, EXTENDED RELEASE ORAL DAILY
Qty: 90 TABLET | Refills: 3 | Status: SHIPPED | OUTPATIENT
Start: 2019-08-28 | End: 2019-11-15 | Stop reason: SDUPTHER

## 2019-08-28 RX ORDER — FUROSEMIDE 40 MG/1
40 TABLET ORAL 2 TIMES DAILY
Qty: 180 TABLET | Refills: 3 | Status: SHIPPED | OUTPATIENT
Start: 2019-08-28 | End: 2020-07-25

## 2019-08-28 NOTE — PROGRESS NOTES
Subjective:       Patient ID: Mallorychiara David is a 63 y.o. female.    Chief Complaint: Gynecologic Exam    Gynecologic Exam   The patient's pertinent negatives include no vaginal bleeding or vaginal discharge. The problem has been unchanged. The patient is experiencing no pain. Pertinent negatives include no abdominal pain, diarrhea, fever, headaches, rash, sore throat or vomiting. No, her partner does not have an STD. She uses nothing for contraception. She is postmenopausal.       Review of Systems   Constitutional: Negative for fatigue, fever and unexpected weight change.   HENT: Negative for ear pain and sore throat.    Eyes: Negative for pain and visual disturbance.   Respiratory: Negative for cough and shortness of breath.    Cardiovascular: Negative for chest pain and palpitations.   Gastrointestinal: Negative for abdominal pain, diarrhea and vomiting.   Genitourinary: Negative for vaginal discharge.   Musculoskeletal: Negative for arthralgias and myalgias.   Skin: Negative for color change and rash.   Neurological: Negative for dizziness and headaches.   Psychiatric/Behavioral: Negative for dysphoric mood and sleep disturbance. The patient is not nervous/anxious.        Vitals:    08/28/19 0954   BP: 124/73   Pulse: 72   Temp: 98.5 °F (36.9 °C)       Objective:     Current Outpatient Medications   Medication Sig Dispense Refill    ANTIOX #11/OM3/DHA/EPA/LUT/NAMAN (50+ ADULT EYE HEALTH ORAL) Take by mouth.      aspirin 81 MG chewable tablet Take 81 mg by mouth once daily.        furosemide (LASIX) 40 MG tablet Take 1 tablet (40 mg total) by mouth 2 (two) times daily. 180 tablet 3    levothyroxine (SYNTHROID) 100 MCG tablet Take 1 tablet (100 mcg total) by mouth once daily. 90 tablet 0    MAGNESIUM ORAL Take by mouth once daily.      metoprolol succinate (TOPROL-XL) 50 MG 24 hr tablet Take 1 tablet (50 mg total) by mouth once daily. 90 tablet 3    potassium chloride SA (K-DUR,KLOR-CON) 20 MEQ tablet Take 1  tablet (20 mEq total) by mouth once daily. 90 tablet 3    rosuvastatin (CRESTOR) 10 MG tablet Take 1 tablet (10 mg total) by mouth once daily. 90 tablet 3    cetirizine (ZYRTEC) 10 mg Cap Zyrtec   1 po q day       No current facility-administered medications for this visit.        Physical Exam   Constitutional: She is oriented to person, place, and time. She appears well-developed and well-nourished. No distress.   HENT:   Head: Normocephalic and atraumatic.   Eyes: Pupils are equal, round, and reactive to light. EOM are normal.   Neck: Normal range of motion. Neck supple.   Cardiovascular: Normal rate and regular rhythm.   Pulmonary/Chest: Effort normal and breath sounds normal. Right breast exhibits no mass, no nipple discharge and no skin change. Left breast exhibits no mass, no nipple discharge and no skin change.   Abdominal: She exhibits no distension. There is no tenderness.   Genitourinary: Vagina normal. There is no rash on the right labia. There is no rash on the left labia. Uterus is not tender. Cervix exhibits no motion tenderness, no discharge and no friability. Right adnexum displays no mass and no tenderness. Left adnexum displays no mass and no tenderness.   Musculoskeletal: Normal range of motion.   Neurological: She is alert and oriented to person, place, and time.   Skin: Skin is warm and dry. No rash noted.   Psychiatric: She has a normal mood and affect. Judgment normal.   Nursing note and vitals reviewed.      Pap smear performed. Endocervical sample obtained using a brush. Cervix without bleeding or discharge. Vaginal wall with no abnormalities noted. Pt tolerated procedure well. Sample will be sent for pathology.     Assessment:       1. Encounter for Papanicolaou smear for cervical cancer screening        Plan:   Encounter for Papanicolaou smear for cervical cancer screening  -     Liquid-based pap smear, screening  -     HPV High Risk Genotypes, PCR        No follow-ups on file.    There  are no Patient Instructions on file for this visit.

## 2019-08-28 NOTE — PROGRESS NOTES
Subjective:      Patient ID: Mallorychiara David is a 63 y.o. female.    Chief Complaint: Annual Exam and Mass    Problem List Items Addressed This Visit     History of colon polyps - Primary    Overview     The patient has had colon polyps in the past.  The next colonoscopy is due in Oct 2019.           Hyperlipidemia    Overview     The patient presents with hyperlipidemia.  The patient reports tolerating the medication well and is in excellent compliance.  There have been no medication side effects.  The patient denies chest pain, neuropathy, and myalgias.  The patient has reduced fat intake and has been exercising.  Current treatment has included the medications listed in the med card.    Lab Results   Component Value Date    CHOL 154 08/26/2019    CHOL 196 06/07/2018    CHOL 161 04/03/2017       Lab Results   Component Value Date    HDL 69 08/26/2019    HDL 76 (H) 06/07/2018    HDL 66 04/03/2017       Lab Results   Component Value Date    LDLCALC 68.4 08/26/2019    LDLCALC 99.4 06/07/2018    LDLCALC 85.8 04/03/2017       Lab Results   Component Value Date    TRIG 83 08/26/2019    TRIG 103 06/07/2018    TRIG 46 04/03/2017       Lab Results   Component Value Date    CHOLHDL 44.8 08/26/2019    CHOLHDL 38.8 06/07/2018    CHOLHDL 41.0 04/03/2017     Lab Results   Component Value Date    ALT 16 08/26/2019    AST 20 08/26/2019    ALKPHOS 70 08/26/2019    BILITOT 1.1 (H) 08/26/2019   '           Hypertension    Overview     The patient presents with essential hypertension.  The patient is tolerating the medication well and is in excellent compliance.  The patient is experiencing no side effects.  Counseling was offered regarding low salt diets.  The patient has a reduced salt intake.  The patient denies chest pain, palpitations, shortness of breath, dyspnea on exertion, left or murmur neck pain, nausea, vomiting, diaphoresis, paroxysmal nocturnal dyspnea, and orthopnea.   Hypertension Medications             furosemide  (LASIX) 40 MG tablet Take 1 tablet (40 mg total) by mouth 2 (two) times daily.    metoprolol succinate (TOPROL-XL) 50 MG 24 hr tablet Take 1 tablet (50 mg total) by mouth once daily.                 Paroxysmal supraventricular tachycardia    Toxic diffuse goiter    Overview     The patient presents with hypothyroidism.  The patient denies agitation, anxiety, blurred vision, chest pain, cold intolerance, constipation, dizziness, dry skin, fatigue, lightheadedness, paresthesias, skin coarsening, tachycardia, tremor, weight gain or weight loss.  The patient's current treatment has included Synthroid with a good response but it is low now and I will need to adjust the medicine..    Lab Results   Component Value Date    TSH 0.237 (L) 08/26/2019                Other Visit Diagnoses     Encounter for screening mammogram for breast cancer              Past Medical History:  Past Medical History:   Diagnosis Date    *Atrial flutter     s/p ablation    Abnormal Pap smear of cervix     ASD (atrial septal defect), ostium secundum     repaired    Benign hypertensive heart disease without heart failure     Endometriosis     Fatty liver     Hyperlipidemia     Hypertension     2009    Nodular goiter (nontoxic)     1999    Special screening for malignant neoplasms, colon 10/28/2014     Past Surgical History:   Procedure Laterality Date    ABLATION OF DYSRHYTHMIC FOCUS      ASD REPAIR, SECUNDUM      CARPAL TUNNEL RELEASE  2007    right hand    COLONOSCOPY N/A 10/28/2014    [facility]    Heart ablation  2011    OOPHORECTOMY      Open Heart Surgery  1970    OVARIAN CYST SURGERY  1981     Review of patient's allergies indicates:   Allergen Reactions    No known drug allergies      Current Outpatient Medications on File Prior to Visit   Medication Sig Dispense Refill    ANTIOX #11/OM3/DHA/EPA/LUT/NAMAN (50+ ADULT EYE HEALTH ORAL) Take by mouth.      aspirin 81 MG chewable tablet Take  81 mg by mouth once daily.        cetirizine (ZYRTEC) 10 mg Cap Zyrtec   1 po q day      furosemide (LASIX) 40 MG tablet Take 1 tablet (40 mg total) by mouth 2 (two) times daily. 60 tablet 11    levothyroxine (SYNTHROID) 112 MCG tablet Take 1 tablet (112 mcg total) by mouth once daily. 90 tablet 3    MAGNESIUM ORAL Take by mouth once daily.      metoprolol succinate (TOPROL-XL) 50 MG 24 hr tablet Take 1 tablet (50 mg total) by mouth once daily. 90 tablet 0    potassium chloride SA (K-DUR,KLOR-CON) 20 MEQ tablet Take 1 tablet (20 mEq total) by mouth once daily. 30 tablet 8    rosuvastatin (CRESTOR) 10 MG tablet Take 1 tablet (10 mg total) by mouth once daily. 90 tablet 3    [DISCONTINUED] fluticasone (FLONASE) 50 mcg/actuation nasal spray Flonase 50 mcg/actuation nasal spray,suspension   Inhale 1 spray every day by intranasal route. 1 Bottle 0     No current facility-administered medications on file prior to visit.      Social History     Socioeconomic History    Marital status:      Spouse name: Alexi    Number of children: 2    Years of education: Not on file    Highest education level: Not on file   Occupational History    Occupation:      Employer: [employer]   Social Needs    Financial resource strain: Not on file    Food insecurity:     Worry: Not on file     Inability: Not on file    Transportation needs:     Medical: Not on file     Non-medical: Not on file   Tobacco Use    Smoking status: Never Smoker    Smokeless tobacco: Never Used   Substance and Sexual Activity    Alcohol use: No    Drug use: No    Sexual activity: Yes   Lifestyle    Physical activity:     Days per week: Not on file     Minutes per session: Not on file    Stress: Not on file   Relationships    Social connections:     Talks on phone: Not on file     Gets together: Not on file     Attends Restoration service: Not on file     Active member of club or organization: Not on file     Attends  "meetings of clubs or organizations: Not on file     Relationship status: Not on file   Other Topics Concern    Not on file   Social History Narrative    Not on file     Family History   Problem Relation Age of Onset    Hypertension Mother     Cancer Father     Diabetes Father     Breast cancer Sister     Aneurysm Brother         this was of his heart.       Review of Systems   Constitutional: Negative for activity change and unexpected weight change.   HENT: Negative for hearing loss, rhinorrhea and trouble swallowing.    Eyes: Positive for visual disturbance. Negative for discharge.   Respiratory: Negative for chest tightness and wheezing.    Cardiovascular: Negative for chest pain and palpitations.   Gastrointestinal: Positive for diarrhea. Negative for blood in stool, constipation and vomiting.   Endocrine: Positive for polyuria. Negative for polydipsia.   Genitourinary: Negative for difficulty urinating, dysuria, hematuria and menstrual problem.   Musculoskeletal: Positive for arthralgias, joint swelling and neck pain.   Neurological: Negative for weakness and headaches.   Psychiatric/Behavioral: Negative for confusion and dysphoric mood.       Objective:     /73   Pulse 72   Temp 98.5 °F (36.9 °C) (Oral)   Ht 5' 2" (1.575 m)   Wt 78 kg (172 lb)   LMP 09/20/2011   BMI 31.46 kg/m²     Physical Exam   Constitutional: She appears well-developed and well-nourished. She is cooperative.   HENT:   Head: Normocephalic and atraumatic.   Right Ear: Tympanic membrane, external ear and ear canal normal.   Left Ear: Tympanic membrane, external ear and ear canal normal.   Nose: Nose normal.   Mouth/Throat: Uvula is midline and mucous membranes are normal. No oral lesions. No oropharyngeal exudate, posterior oropharyngeal edema or posterior oropharyngeal erythema.   Eyes: Pupils are equal, round, and reactive to light. EOM and lids are normal. Right eye exhibits no discharge. Left eye exhibits no discharge. " Right conjunctiva is not injected. Right conjunctiva has no hemorrhage. Left conjunctiva is not injected. Left conjunctiva has no hemorrhage. No scleral icterus. Right eye exhibits no nystagmus. Left eye exhibits no nystagmus.   Neck: Normal range of motion and full passive range of motion without pain. Neck supple. No JVD present. No tracheal tenderness present. Carotid bruit is not present. No tracheal deviation present. No thyroid mass and no thyromegaly present.   Cardiovascular: Normal rate, regular rhythm, S1 normal and S2 normal.   No murmur heard.  Pulses:       Carotid pulses are 2+ on the right side, and 2+ on the left side.       Radial pulses are 2+ on the right side, and 2+ on the left side.        Posterior tibial pulses are 2+ on the right side, and 2+ on the left side.   Pulmonary/Chest: Effort normal and breath sounds normal. No respiratory distress. She has no wheezes. She has no rhonchi. She has no rales.   Abdominal: Soft. Normal appearance, normal aorta and bowel sounds are normal. She exhibits no distension, no abdominal bruit, no pulsatile midline mass and no mass. There is no hepatosplenomegaly. There is no tenderness. There is no rebound.   Musculoskeletal:        Right knee: She exhibits no swelling. No tenderness found.        Left knee: She exhibits no swelling. No tenderness found.        Feet:    Lymphadenopathy:        Head (right side): No submental and no submandibular adenopathy present.        Head (left side): No submental and no submandibular adenopathy present.     She has no cervical adenopathy.   Neurological: She is alert. She has normal strength. No cranial nerve deficit or sensory deficit.   Skin: Skin is warm and dry. No rash noted. No cyanosis. Nails show no clubbing.   Psychiatric: She has a normal mood and affect. Her speech is normal and behavior is normal. Thought content normal. Cognition and memory are normal.     Assessment:     1. Annual physical exam    2. Pure  hypercholesterolemia    3. Essential hypertension    4. Toxic diffuse goiter    5. Hyperlipidemia, unspecified hyperlipidemia type    6. Paroxysmal supraventricular tachycardia    7. History of colon polyps    8. Encounter for screening mammogram for breast cancer    9. Ganglion        Plan:     Problem List Items Addressed This Visit     Ganglion    Relevant Orders    Ambulatory referral to Orthopedics    History of colon polyps    Relevant Orders    Case request GI: COLONOSCOPY (Completed)    Hyperlipidemia    Relevant Medications    rosuvastatin (CRESTOR) 10 MG tablet    Hypertension    Relevant Medications    metoprolol succinate (TOPROL-XL) 50 MG 24 hr tablet    potassium chloride SA (K-DUR,KLOR-CON) 20 MEQ tablet    furosemide (LASIX) 40 MG tablet    levothyroxine (SYNTHROID) 100 MCG tablet    Paroxysmal supraventricular tachycardia    Relevant Medications    metoprolol succinate (TOPROL-XL) 50 MG 24 hr tablet    Toxic diffuse goiter    Relevant Orders    US Soft Tissue Head Neck Thyroid    TSH      Other Visit Diagnoses     Annual physical exam    -  Primary    Encounter for screening mammogram for breast cancer        Relevant Orders    Mammo Digital Screening Bilat        No follow-ups on file.

## 2019-08-28 NOTE — PROGRESS NOTES
DATE: 8/28/2019  PATIENT: Mallory David  REFERRING MD:   CHIEF COMPLAINT:   Chief Complaint   Patient presents with    Right Ankle - Pain       HISTORY:  Mallory David is a 63 y.o. female  who presents for initial evaluation of her right ankle.  She is a new patient to me referred by Dr Latrell Banks for orthopedic evaluation.  Her pain rating is 2/10 and described as dull and increases if she presses on it.  She noticed the bump in July after starting lasix, when the swelling around her ankles decreased, the lump became apparent.  She presents to have it checked.       PAST MEDICAL/SURGICAL HISTORY:  Past Medical History:   Diagnosis Date    *Atrial flutter     s/p ablation    Abnormal Pap smear of cervix     ASD (atrial septal defect), ostium secundum     repaired    Benign hypertensive heart disease without heart failure     Endometriosis     Fatty liver     Hyperlipidemia     Hypertension     2009    Nodular goiter (nontoxic)     1999    Special screening for malignant neoplasms, colon 10/28/2014     Past Surgical History:   Procedure Laterality Date    ABLATION OF DYSRHYTHMIC FOCUS      ASD REPAIR, SECUNDUM      CARPAL TUNNEL RELEASE  2007    right hand    COLONOSCOPY N/A 10/28/2014    [facility]    Heart ablation  2011    OOPHORECTOMY      Open Heart Surgery  1970    OVARIAN CYST SURGERY  1981       Current Medications:   Current Outpatient Medications:     ANTIOX #11/OM3/DHA/EPA/LUT/NAMAN (50+ ADULT EYE HEALTH ORAL), Take by mouth., Disp: , Rfl:     aspirin 81 MG chewable tablet, Take 81 mg by mouth once daily.  , Disp: , Rfl:     cetirizine (ZYRTEC) 10 mg Cap, Zyrtec  1 po q day, Disp: , Rfl:     furosemide (LASIX) 40 MG tablet, Take 1 tablet (40 mg total) by mouth 2 (two) times daily., Disp: 180 tablet, Rfl: 3    levothyroxine (SYNTHROID) 100 MCG tablet, Take 1 tablet (100 mcg total) by mouth once daily., Disp: 90 tablet, Rfl: 0    MAGNESIUM ORAL,  Take by mouth once daily., Disp: , Rfl:     metoprolol succinate (TOPROL-XL) 50 MG 24 hr tablet, Take 1 tablet (50 mg total) by mouth once daily., Disp: 90 tablet, Rfl: 3    potassium chloride SA (K-DUR,KLOR-CON) 20 MEQ tablet, Take 1 tablet (20 mEq total) by mouth once daily., Disp: 90 tablet, Rfl: 3    rosuvastatin (CRESTOR) 10 MG tablet, Take 1 tablet (10 mg total) by mouth once daily., Disp: 90 tablet, Rfl: 3    Family History: family history was reviewed and is noncontributory  Social History:   Social History     Socioeconomic History    Marital status:      Spouse name: Alexi    Number of children: 2    Years of education: Not on file    Highest education level: Not on file   Occupational History    Occupation:      Employer: [employer]   Social Needs    Financial resource strain: Not on file    Food insecurity:     Worry: Not on file     Inability: Not on file    Transportation needs:     Medical: Not on file     Non-medical: Not on file   Tobacco Use    Smoking status: Never Smoker    Smokeless tobacco: Never Used   Substance and Sexual Activity    Alcohol use: No    Drug use: No    Sexual activity: Yes   Lifestyle    Physical activity:     Days per week: Not on file     Minutes per session: Not on file    Stress: Not on file   Relationships    Social connections:     Talks on phone: Not on file     Gets together: Not on file     Attends Religion service: Not on file     Active member of club or organization: Not on file     Attends meetings of clubs or organizations: Not on file     Relationship status: Not on file   Other Topics Concern    Not on file   Social History Narrative    Not on file       ROS:  Constitution: Negative for chills, fever, and sweats. Negative for unexplained weight loss.  Eyes: no redness, no discharge  Ears: no ear pain or tinnitus  Cardiovascular: Negative for chest pain, irregular heartbeat, leg swelling and palpitations.  "  Respiratory: Negative for cough and shortness of breath.   Gastrointestinal: Negative for abdominal pain, nausea and vomiting.   Genitourinary: Negative for bladder incontinence and dysuria.   Neurological: Negative for numbness.   Psychiatric/Behavioral: Negative for behavior changes.   Endocrine: Negative for palpitations.   Hematologic/Lymphatic: Negative for bleeding disorders.  Skin: Negative for pruritis or rash.     PHYSICAL EXAM:  right foot and ankle evaluated the following:    Gait and Alignment  Inspection for ecchymosis, swelling, atrophy, or deformity  Inspection for intra-articular and/or bursal effusions  Tenderness to palpation over the bony and soft tissue structures around the ankle/foot  Range of Motion and presence of contractures  Sensation and motor strength  Anterior drawer and varus/valgus instability  Vascular exam to include skin temperature/color/capillary refill      Right Ankle Exam   Right ankle exam is normal.    Tenderness   The patient is experiencing no tenderness.  Swelling: none    Range of Motion   Dorsiflexion: normal   Plantar flexion: normal   Eversion: normal   Inversion: normal     Muscle Strength   The patient has normal right ankle strength.    Tests   Anterior drawer: negative    Other   Erythema: present  Sensation: normal  Pulse: present     Comments:  Small protrusion about the aspect of the achilles tendon about 4cm proximal from insertion to the calcaneus.           Constitutional:  Mallory David is a well developed, well nourished female in no acute distress.   Vitals:    08/28/19 1100   BP: 124/73   Pulse: 72   Weight: 78 kg (171 lb 15.3 oz)   Height: 5' 2" (1.575 m)     Psychiatric: pleasant,normal mood and affect, behavior is normal  Neurological:  Sensation intact to light touch, normal reflexes. Coordination normal.   Skin: warm, dry, intact  Cardiovascular: capillary refill less than 3 seconds, pulses 2+      IMAGING:   X-ray obtained and personally " reviewed with patient. Radiologist report as follows:  EXAMINATION:  XR ANKLE COMPLETE 3 VIEW LEFT    CLINICAL HISTORY:  Pain in left ankle and joints of left foot    TECHNIQUE:  AP, lateral and oblique views of the left ankle were performed.    COMPARISON:  None    FINDINGS:  There is no radiographic evidence of acute osseous, articular, or soft tissue abnormality.  Ankle mortise is well maintained.      Impression       No acute findings      Electronically signed by: Juan Carlos Kraus MD  Date: 08/28/2019  Time: 11:33            Last Resulted: 08/28/19 11:33                 ASSESSMENT:   Achilles tendon pain    PLAN:  The nature of the diagnosis, using models and diagrams when appropriate, was explained to the patient in detail. Treatment option discussed included non-operative measures of custom orthotic, rest,  modification of activities, application of ice, elevation of extremity, compression, over the counter pain/antiinflammatory relief and physica/occupational therapy.  More aggressive treatment options include referal to podiatrist.  All questions answered and the patient wishes to proceed today with referral to physical therapy.  If no improvement or worsening of symptoms, then consider referral to podiatrist.

## 2019-08-29 ENCOUNTER — TELEPHONE (OUTPATIENT)
Dept: ENDOSCOPY | Facility: HOSPITAL | Age: 64
End: 2019-08-29

## 2019-08-30 ENCOUNTER — PATIENT MESSAGE (OUTPATIENT)
Dept: FAMILY MEDICINE | Facility: CLINIC | Age: 64
End: 2019-08-30

## 2019-09-03 LAB
HPV HR 12 DNA CVX QL NAA+PROBE: NEGATIVE
HPV16 AG SPEC QL: NEGATIVE
HPV18 DNA SPEC QL NAA+PROBE: NEGATIVE

## 2019-09-11 ENCOUNTER — OFFICE VISIT (OUTPATIENT)
Dept: CARDIOLOGY | Facility: CLINIC | Age: 64
End: 2019-09-11
Payer: COMMERCIAL

## 2019-09-11 VITALS
HEART RATE: 70 BPM | BODY MASS INDEX: 31.85 KG/M2 | DIASTOLIC BLOOD PRESSURE: 81 MMHG | HEIGHT: 62 IN | SYSTOLIC BLOOD PRESSURE: 133 MMHG | WEIGHT: 173.06 LBS

## 2019-09-11 DIAGNOSIS — Q21.11 OSTIUM SECUNDUM TYPE ATRIAL SEPTAL DEFECT: Primary | ICD-10-CM

## 2019-09-11 DIAGNOSIS — I10 ESSENTIAL HYPERTENSION: ICD-10-CM

## 2019-09-11 DIAGNOSIS — I47.10 PAROXYSMAL SUPRAVENTRICULAR TACHYCARDIA: ICD-10-CM

## 2019-09-11 DIAGNOSIS — E78.00 PURE HYPERCHOLESTEROLEMIA: ICD-10-CM

## 2019-09-11 PROCEDURE — 99999 PR PBB SHADOW E&M-EST. PATIENT-LVL III: CPT | Mod: PBBFAC,,, | Performed by: INTERNAL MEDICINE

## 2019-09-11 PROCEDURE — 3079F PR MOST RECENT DIASTOLIC BLOOD PRESSURE 80-89 MM HG: ICD-10-PCS | Mod: CPTII,S$GLB,, | Performed by: INTERNAL MEDICINE

## 2019-09-11 PROCEDURE — 3079F DIAST BP 80-89 MM HG: CPT | Mod: CPTII,S$GLB,, | Performed by: INTERNAL MEDICINE

## 2019-09-11 PROCEDURE — 3008F PR BODY MASS INDEX (BMI) DOCUMENTED: ICD-10-PCS | Mod: CPTII,S$GLB,, | Performed by: INTERNAL MEDICINE

## 2019-09-11 PROCEDURE — 99999 PR PBB SHADOW E&M-EST. PATIENT-LVL III: ICD-10-PCS | Mod: PBBFAC,,, | Performed by: INTERNAL MEDICINE

## 2019-09-11 PROCEDURE — 3075F SYST BP GE 130 - 139MM HG: CPT | Mod: CPTII,S$GLB,, | Performed by: INTERNAL MEDICINE

## 2019-09-11 PROCEDURE — 3008F BODY MASS INDEX DOCD: CPT | Mod: CPTII,S$GLB,, | Performed by: INTERNAL MEDICINE

## 2019-09-11 PROCEDURE — 99214 PR OFFICE/OUTPT VISIT, EST, LEVL IV, 30-39 MIN: ICD-10-PCS | Mod: S$GLB,,, | Performed by: INTERNAL MEDICINE

## 2019-09-11 PROCEDURE — 99214 OFFICE O/P EST MOD 30 MIN: CPT | Mod: S$GLB,,, | Performed by: INTERNAL MEDICINE

## 2019-09-11 PROCEDURE — 3075F PR MOST RECENT SYSTOLIC BLOOD PRESS GE 130-139MM HG: ICD-10-PCS | Mod: CPTII,S$GLB,, | Performed by: INTERNAL MEDICINE

## 2019-09-11 NOTE — PROGRESS NOTES
Subjective:   Patient ID:  Mallorychiara David is a 63 y.o. female who presents for follow-up of Follow-up  Pts LE edema improved with lasix. HCTZ stopped.Patient denies CP, angina or anginal equivalent.  Lipids are at goal.    Hyperlipidemia   This is a chronic problem. The current episode started more than 1 year ago. The problem is controlled. Recent lipid tests were reviewed and are variable. Pertinent negatives include no chest pain or shortness of breath. Current antihyperlipidemic treatment includes statins. The current treatment provides moderate improvement of lipids. There are no compliance problems.    Hypertension   This is a chronic problem. The current episode started more than 1 year ago. The problem has been gradually improving since onset. The problem is controlled. Pertinent negatives include no chest pain, palpitations or shortness of breath. Past treatments include beta blockers and diuretics. The current treatment provides moderate improvement. There are no compliance problems.        Review of Systems   Constitution: Negative. Negative for weight gain.   HENT: Negative.    Eyes: Negative.    Cardiovascular: Negative.  Negative for chest pain, leg swelling and palpitations.   Respiratory: Negative.  Negative for shortness of breath.    Endocrine: Negative.    Hematologic/Lymphatic: Negative.    Skin: Negative.    Musculoskeletal: Negative for muscle weakness.   Gastrointestinal: Negative.    Genitourinary: Negative.    Neurological: Negative.  Negative for dizziness.   Psychiatric/Behavioral: Negative.    Allergic/Immunologic: Negative.      Family History   Problem Relation Age of Onset    Hypertension Mother     Cancer Father     Diabetes Father     Breast cancer Sister     Aneurysm Brother         this was of his heart.     Past Medical History:   Diagnosis Date    *Atrial flutter     s/p ablation    Abnormal Pap smear of cervix     ASD (atrial septal defect), ostium secundum     repaired     Benign hypertensive heart disease without heart failure     Endometriosis     Fatty liver     Hyperlipidemia     Hypertension     2009    Nodular goiter (nontoxic)     1999    Special screening for malignant neoplasms, colon 10/28/2014     Social History     Socioeconomic History    Marital status:      Spouse name: Alexi    Number of children: 2    Years of education: Not on file    Highest education level: Not on file   Occupational History    Occupation:      Employer: [employer]   Social Needs    Financial resource strain: Not on file    Food insecurity:     Worry: Not on file     Inability: Not on file    Transportation needs:     Medical: Not on file     Non-medical: Not on file   Tobacco Use    Smoking status: Never Smoker    Smokeless tobacco: Never Used   Substance and Sexual Activity    Alcohol use: No    Drug use: No    Sexual activity: Yes   Lifestyle    Physical activity:     Days per week: Not on file     Minutes per session: Not on file    Stress: Not on file   Relationships    Social connections:     Talks on phone: Not on file     Gets together: Not on file     Attends Yazidi service: Not on file     Active member of club or organization: Not on file     Attends meetings of clubs or organizations: Not on file     Relationship status: Not on file   Other Topics Concern    Not on file   Social History Narrative    Not on file     Current Outpatient Medications on File Prior to Visit   Medication Sig Dispense Refill    ANTIOX #11/OM3/DHA/EPA/LUT/NAMAN (50+ ADULT EYE HEALTH ORAL) Take by mouth.      aspirin 81 MG chewable tablet Take 81 mg by mouth once daily.        cetirizine (ZYRTEC) 10 mg Cap Zyrtec   1 po q day      furosemide (LASIX) 40 MG tablet Take 1 tablet (40 mg total) by mouth 2 (two) times daily. 180 tablet 3    levothyroxine (SYNTHROID) 100 MCG tablet Take 1 tablet (100 mcg total) by mouth once daily. 90 tablet 0    MAGNESIUM  ORAL Take by mouth once daily.      metoprolol succinate (TOPROL-XL) 50 MG 24 hr tablet Take 1 tablet (50 mg total) by mouth once daily. 90 tablet 3    potassium chloride SA (K-DUR,KLOR-CON) 20 MEQ tablet Take 1 tablet (20 mEq total) by mouth once daily. 90 tablet 3    rosuvastatin (CRESTOR) 10 MG tablet Take 1 tablet (10 mg total) by mouth once daily. 90 tablet 3     No current facility-administered medications on file prior to visit.      Review of patient's allergies indicates:   Allergen Reactions    No known drug allergies        Objective:     Physical Exam   Constitutional: She is oriented to person, place, and time. She appears well-developed and well-nourished.   HENT:   Head: Normocephalic and atraumatic.   Eyes: Pupils are equal, round, and reactive to light. Conjunctivae and EOM are normal.   Neck: Normal range of motion. Neck supple.   Cardiovascular: Normal rate, regular rhythm, normal heart sounds and intact distal pulses.   Pulmonary/Chest: Effort normal and breath sounds normal.   Abdominal: Soft. Bowel sounds are normal.   Musculoskeletal: Normal range of motion.   Neurological: She is alert and oriented to person, place, and time.   Skin: Skin is warm and dry.   Psychiatric: She has a normal mood and affect.   Nursing note and vitals reviewed.      Assessment:     1. Ostium secundum type atrial septal defect    2. Paroxysmal supraventricular tachycardia    3. Essential hypertension    4. Pure hypercholesterolemia        Plan:     Ostium secundum type atrial septal defect    Paroxysmal supraventricular tachycardia    Essential hypertension    Pure hypercholesterolemia      Continue lasix, toprol-htn  Continue statin-hlp

## 2019-10-10 ENCOUNTER — IMMUNIZATION (OUTPATIENT)
Dept: FAMILY MEDICINE | Facility: CLINIC | Age: 64
End: 2019-10-10
Payer: COMMERCIAL

## 2019-10-10 PROCEDURE — 99999 PR PBB SHADOW E&M-EST. PATIENT-LVL II: CPT | Mod: PBBFAC,,,

## 2019-10-10 PROCEDURE — 99999 PR PBB SHADOW E&M-EST. PATIENT-LVL II: ICD-10-PCS | Mod: PBBFAC,,,

## 2019-10-10 PROCEDURE — 90471 FLU VACCINE (QUAD) GREATER THAN OR EQUAL TO 3YO PRESERVATIVE FREE IM: ICD-10-PCS | Mod: S$GLB,,, | Performed by: FAMILY MEDICINE

## 2019-10-10 PROCEDURE — 90686 FLU VACCINE (QUAD) GREATER THAN OR EQUAL TO 3YO PRESERVATIVE FREE IM: ICD-10-PCS | Mod: S$GLB,,, | Performed by: FAMILY MEDICINE

## 2019-10-10 PROCEDURE — 90686 IIV4 VACC NO PRSV 0.5 ML IM: CPT | Mod: S$GLB,,, | Performed by: FAMILY MEDICINE

## 2019-10-10 PROCEDURE — 90471 IMMUNIZATION ADMIN: CPT | Mod: S$GLB,,, | Performed by: FAMILY MEDICINE

## 2019-10-14 ENCOUNTER — HOSPITAL ENCOUNTER (OUTPATIENT)
Dept: RADIOLOGY | Facility: HOSPITAL | Age: 64
Discharge: HOME | End: 2019-10-14
Attending: FAMILY MEDICINE
Payer: COMMERCIAL

## 2019-10-14 VITALS — WEIGHT: 173 LBS | BODY MASS INDEX: 31.83 KG/M2 | HEIGHT: 62 IN

## 2019-10-14 DIAGNOSIS — Z12.31 ENCOUNTER FOR SCREENING MAMMOGRAM FOR BREAST CANCER: ICD-10-CM

## 2019-10-14 PROCEDURE — 77067 SCR MAMMO BI INCL CAD: CPT | Mod: TC,PO

## 2019-10-14 PROCEDURE — 77063 MAMMO DIGITAL SCREENING BILAT WITH TOMOSYNTHESIS_CAD: ICD-10-PCS | Mod: 26,,, | Performed by: RADIOLOGY

## 2019-10-14 PROCEDURE — 77063 BREAST TOMOSYNTHESIS BI: CPT | Mod: 26,,, | Performed by: RADIOLOGY

## 2019-10-14 PROCEDURE — 77067 MAMMO DIGITAL SCREENING BILAT WITH TOMOSYNTHESIS_CAD: ICD-10-PCS | Mod: 26,,, | Performed by: RADIOLOGY

## 2019-10-14 PROCEDURE — 77067 SCR MAMMO BI INCL CAD: CPT | Mod: 26,,, | Performed by: RADIOLOGY

## 2019-11-08 ENCOUNTER — TELEPHONE (OUTPATIENT)
Dept: ENDOSCOPY | Facility: HOSPITAL | Age: 64
End: 2019-11-08

## 2019-11-08 ENCOUNTER — PATIENT MESSAGE (OUTPATIENT)
Dept: ENDOSCOPY | Facility: HOSPITAL | Age: 64
End: 2019-11-08

## 2019-11-08 RX ORDER — SODIUM, POTASSIUM,MAG SULFATES 17.5-3.13G
1 SOLUTION, RECONSTITUTED, ORAL ORAL DAILY
Qty: 1 KIT | Refills: 0 | Status: SHIPPED | OUTPATIENT
Start: 2019-11-08 | End: 2019-11-10

## 2019-11-08 NOTE — TELEPHONE ENCOUNTER
Endoscopy Scheduling Questionnaire:    1. Have you been admitted overnight to the hospital in the past 3 months? no  2. Have you had a cardiac stent placed in the past 12 months? no  3. Have you had a stroke or heart attack in the past 6 months? no  4. Have you had any chest pain in the past 3 months? no      If so, have you been evaluated by your PCP or Cardiologist? no  5. Do you take prescription weight loss medication? no  6. Have you been diagnosed with Diverticulitis within the past 3 months? no  7. Are you on dialysis? no  8. Are you diabetic? no Do you have an insulin pump? no  9. Do you have any other health issues that you feel might limit your ability to safely have the procedure and/or sedation? no  10. Is the patient over 81 yo? no        If so, has the patient been seen by their PCP or GI in the last 6 months? N/A       -I have reviewed the last colonoscopy for recommendations regarding surveillance and bowel prep  Yes  -I have reviewed the patient's medications and allergies. She is not on high risk medication, A clearance request NA  -I have verified the pharmacy information. The prep being used is Suprep. The patient's prep instructions were sent via [facility] patient portal..    Date Endoscopy Scheduled: Date: 1/14/20

## 2019-11-15 DIAGNOSIS — I10 ESSENTIAL HYPERTENSION: ICD-10-CM

## 2019-11-15 DIAGNOSIS — I47.10 PAROXYSMAL SUPRAVENTRICULAR TACHYCARDIA: ICD-10-CM

## 2019-11-16 RX ORDER — METOPROLOL SUCCINATE 50 MG/1
50 TABLET, EXTENDED RELEASE ORAL DAILY
Qty: 90 TABLET | Refills: 3 | Status: SHIPPED | OUTPATIENT
Start: 2019-11-16 | End: 2020-02-22 | Stop reason: SDUPTHER

## 2019-11-17 ENCOUNTER — PATIENT MESSAGE (OUTPATIENT)
Dept: ENDOSCOPY | Facility: HOSPITAL | Age: 64
End: 2019-11-17

## 2019-11-17 DIAGNOSIS — E05.00 TOXIC DIFFUSE GOITER: Primary | ICD-10-CM

## 2019-11-21 ENCOUNTER — LAB VISIT (OUTPATIENT)
Dept: LAB | Facility: HOSPITAL | Age: 64
End: 2019-11-21
Attending: FAMILY MEDICINE
Payer: COMMERCIAL

## 2019-11-21 DIAGNOSIS — E05.00 TOXIC DIFFUSE GOITER: ICD-10-CM

## 2019-11-21 LAB — TSH SERPL DL<=0.005 MIU/L-ACNC: 1.71 UIU/ML (ref 0.4–4)

## 2019-11-21 PROCEDURE — 36415 COLL VENOUS BLD VENIPUNCTURE: CPT | Mod: PO

## 2019-11-21 PROCEDURE — 84443 ASSAY THYROID STIM HORMONE: CPT

## 2019-11-22 DIAGNOSIS — I10 ESSENTIAL HYPERTENSION: ICD-10-CM

## 2019-11-22 RX ORDER — LEVOTHYROXINE SODIUM 100 UG/1
100 TABLET ORAL DAILY
Qty: 90 TABLET | Refills: 3 | Status: SHIPPED | OUTPATIENT
Start: 2019-11-22 | End: 2020-02-22 | Stop reason: SDUPTHER

## 2019-11-22 NOTE — TELEPHONE ENCOUNTER
----- Message from Latrell Banks MD sent at 11/22/2019  1:31 PM CST -----  Send a prescription for the patient's thyroid medication (synthroid or levothyroxine) at the current dose in the medcard to the pharmacy and refill it x 1 year.     The patient's health maintenance that is due is below:  Colonoscopy due on 10/28/2019

## 2019-11-27 ENCOUNTER — OFFICE VISIT (OUTPATIENT)
Dept: DERMATOLOGY | Facility: CLINIC | Age: 64
End: 2019-11-27
Payer: COMMERCIAL

## 2019-11-27 VITALS — WEIGHT: 173 LBS | BODY MASS INDEX: 31.83 KG/M2 | HEIGHT: 62 IN

## 2019-11-27 DIAGNOSIS — D48.5 NEOPLASM OF UNCERTAIN BEHAVIOR OF SKIN: Primary | ICD-10-CM

## 2019-11-27 DIAGNOSIS — D22.9 MULTIPLE BENIGN NEVI: ICD-10-CM

## 2019-11-27 DIAGNOSIS — Z12.83 SKIN CANCER SCREENING: ICD-10-CM

## 2019-11-27 DIAGNOSIS — L82.1 SEBORRHEIC KERATOSES: ICD-10-CM

## 2019-11-27 PROCEDURE — 99999 PR PBB SHADOW E&M-EST. PATIENT-LVL III: ICD-10-PCS | Mod: PBBFAC,,, | Performed by: DERMATOLOGY

## 2019-11-27 PROCEDURE — 3008F BODY MASS INDEX DOCD: CPT | Mod: CPTII,S$GLB,, | Performed by: DERMATOLOGY

## 2019-11-27 PROCEDURE — 99213 OFFICE O/P EST LOW 20 MIN: CPT | Mod: 25,S$GLB,, | Performed by: DERMATOLOGY

## 2019-11-27 PROCEDURE — 3008F PR BODY MASS INDEX (BMI) DOCUMENTED: ICD-10-PCS | Mod: CPTII,S$GLB,, | Performed by: DERMATOLOGY

## 2019-11-27 PROCEDURE — 88305 TISSUE EXAM BY PATHOLOGIST: ICD-10-PCS | Mod: 26,,, | Performed by: PATHOLOGY

## 2019-11-27 PROCEDURE — 11102 TANGNTL BX SKIN SINGLE LES: CPT | Mod: S$GLB,,, | Performed by: DERMATOLOGY

## 2019-11-27 PROCEDURE — 88305 TISSUE EXAM BY PATHOLOGIST: CPT | Mod: 26,,, | Performed by: PATHOLOGY

## 2019-11-27 PROCEDURE — 11102 PR TANGENTIAL BIOPSY, SKIN, SINGLE LESION: ICD-10-PCS | Mod: S$GLB,,, | Performed by: DERMATOLOGY

## 2019-11-27 PROCEDURE — 99999 PR PBB SHADOW E&M-EST. PATIENT-LVL III: CPT | Mod: PBBFAC,,, | Performed by: DERMATOLOGY

## 2019-11-27 PROCEDURE — 99213 PR OFFICE/OUTPT VISIT, EST, LEVL III, 20-29 MIN: ICD-10-PCS | Mod: 25,S$GLB,, | Performed by: DERMATOLOGY

## 2019-11-27 PROCEDURE — 88305 TISSUE EXAM BY PATHOLOGIST: CPT | Performed by: PATHOLOGY

## 2019-11-27 NOTE — PROGRESS NOTES
Subjective:       Patient ID:  Mallorychiara David is a 64 y.o. female who presents for   Chief Complaint   Patient presents with    Skin Check     TBSE    Spot     L lower leg, L flank, forehead, 1 year, crusty and rough, no tx     Pt last seen 2-9-17 for UBSE and removal of skin tags    Here today for TBSE and spots to forehead, L flank, and L lower leg for 1 year.  Crusty and rough, no tx.    No phx of skin cancer  Mother and Father hx of BCC      Review of Systems   Constitutional: Negative for fever, chills and fatigue.   Skin: Positive for daily sunscreen use, activity-related sunscreen use and wears hat.   Hematologic/Lymphatic: Does not bruise/bleed easily.        Objective:    Physical Exam   Constitutional: She appears well-developed and well-nourished. No distress.   Eyes: Lids are normal.  No conjunctival no injection.   Neurological: She is alert and oriented to person, place, and time. She is not disoriented.   Psychiatric: She has a normal mood and affect.   Skin:   Areas Examined (abnormalities noted in diagram):   Head / Face Inspection Performed  Neck Inspection Performed  Chest / Axilla Inspection Performed  Back Inspection Performed  RUE Inspected  LUE Inspection Performed  RLE Inspected  LLE Inspection Performed                   Diagram Legend     Erythematous scaling macule/papule c/w actinic keratosis       Vascular papule c/w angioma      Pigmented verrucoid papule/plaque c/w seborrheic keratosis      Yellow umbilicated papule c/w sebaceous hyperplasia      Irregularly shaped tan macule c/w lentigo     1-2 mm smooth white papules consistent with Milia      Movable subcutaneous cyst with punctum c/w epidermal inclusion cyst      Subcutaneous movable cyst c/w pilar cyst      Firm pink to brown papule c/w dermatofibroma      Pedunculated fleshy papule(s) c/w skin tag(s)      Evenly pigmented macule c/w junctional nevus     Mildly variegated pigmented, slightly irregular-bordered macule c/w  mildly atypical nevus      Flesh colored to evenly pigmented papule c/w intradermal nevus       Pink pearly papule/plaque c/w basal cell carcinoma      Erythematous hyperkeratotic cursted plaque c/w SCC      Surgical scar with no sign of skin cancer recurrence      Open and closed comedones      Inflammatory papules and pustules      Verrucoid papule consistent consistent with wart     Erythematous eczematous patches and plaques     Dystrophic onycholytic nail with subungual debris c/w onychomycosis     Umbilicated papule    Erythematous-base heme-crusted tan verrucoid plaque consistent with inflamed seborrheic keratosis     Erythematous Silvery Scaling Plaque c/w Psoriasis     See annotation      Assessment / Plan:      Pathology Orders:     Normal Orders This Visit    Specimen to Pathology, Dermatology     Questions:    Procedure Type:  Dermatology and skin neoplasms    Number of Specimens:  1    ------------------------:  -------------------------    Spec 1 Procedure:  Biopsy    Spec 1 Clinical Impression:  IDN    Spec 1 Source:  left nasal sidewall        Neoplasm of uncertain behavior of skin  -     Specimen to Pathology, Dermatology    Shave biopsy procedure note:    Shave biopsy performed after verbal consent including risk of infection, scar, recurrence, need for additional treatment of site. Area prepped with alcohol, anesthetized with approximately 1.0cc of 1% lidocaine with epinephrine. Lesional tissue shaved with razor blade. Hemostasis achieved with application of aluminum chloride followed by hyfrecation. No complications. Dressing applied. Wound care explained.        Multiple benign nevi  Discussed ABCDE's of nevi.  Monitor for new mole or moles that are becoming bigger, darker, irritated, or developing irregular borders.       Skin cancer screening      Total body skin examination performed today including at least 12 points as noted in physical examination. No lesions suspicious for malignancy  noted.      Seborrheic keratoses  trunk and extremities  These are benign inherited growths without a malignant potential. Reassurance given to patient. No treatment is necessary.              Follow up in about 1 year (around 11/27/2020).

## 2019-12-06 LAB
FINAL PATHOLOGIC DIAGNOSIS: NORMAL
GROSS: NORMAL

## 2019-12-31 ENCOUNTER — PATIENT MESSAGE (OUTPATIENT)
Dept: ENDOSCOPY | Facility: HOSPITAL | Age: 64
End: 2019-12-31

## 2020-01-03 ENCOUNTER — PATIENT MESSAGE (OUTPATIENT)
Dept: FAMILY MEDICINE | Facility: CLINIC | Age: 65
End: 2020-01-03

## 2020-01-14 ENCOUNTER — HOSPITAL ENCOUNTER (OUTPATIENT)
Facility: HOSPITAL | Age: 65
Discharge: HOME | End: 2020-01-14
Attending: FAMILY MEDICINE | Admitting: FAMILY MEDICINE
Payer: COMMERCIAL

## 2020-01-14 ENCOUNTER — ANESTHESIA (OUTPATIENT)
Dept: ENDOSCOPY | Facility: HOSPITAL | Age: 65
End: 2020-01-14
Payer: COMMERCIAL

## 2020-01-14 ENCOUNTER — ANESTHESIA EVENT (OUTPATIENT)
Dept: ENDOSCOPY | Facility: HOSPITAL | Age: 65
End: 2020-01-14
Payer: COMMERCIAL

## 2020-01-14 VITALS
OXYGEN SATURATION: 96 % | DIASTOLIC BLOOD PRESSURE: 62 MMHG | HEART RATE: 65 BPM | BODY MASS INDEX: 31.56 KG/M2 | WEIGHT: 171.5 LBS | RESPIRATION RATE: 16 BRPM | SYSTOLIC BLOOD PRESSURE: 149 MMHG | TEMPERATURE: 98 F | HEIGHT: 62 IN

## 2020-01-14 DIAGNOSIS — Z12.11 SPECIAL SCREENING FOR MALIGNANT NEOPLASMS, COLON: ICD-10-CM

## 2020-01-14 DIAGNOSIS — K63.5 POLYP OF COLON, UNSPECIFIED PART OF COLON, UNSPECIFIED TYPE: ICD-10-CM

## 2020-01-14 DIAGNOSIS — Z86.010 HISTORY OF COLON POLYPS: Primary | ICD-10-CM

## 2020-01-14 DIAGNOSIS — K64.9 HEMORRHOIDS, UNSPECIFIED HEMORRHOID TYPE: ICD-10-CM

## 2020-01-14 PROCEDURE — 88305 TISSUE EXAM BY PATHOLOGIST: ICD-10-PCS | Mod: 26,,, | Performed by: PATHOLOGY

## 2020-01-14 PROCEDURE — 45385 COLONOSCOPY W/LESION REMOVAL: CPT | Mod: 33,,, | Performed by: FAMILY MEDICINE

## 2020-01-14 PROCEDURE — 63600175 PHARM REV CODE 636 W HCPCS: Performed by: NURSE ANESTHETIST, CERTIFIED REGISTERED

## 2020-01-14 PROCEDURE — 45385 PR COLONOSCOPY,REMV LESN,SNARE: ICD-10-PCS | Mod: 33,,, | Performed by: FAMILY MEDICINE

## 2020-01-14 PROCEDURE — 37000009 HC ANESTHESIA EA ADD 15 MINS: Performed by: FAMILY MEDICINE

## 2020-01-14 PROCEDURE — 45385 COLONOSCOPY W/LESION REMOVAL: CPT | Performed by: FAMILY MEDICINE

## 2020-01-14 PROCEDURE — 63600175 PHARM REV CODE 636 W HCPCS: Performed by: FAMILY MEDICINE

## 2020-01-14 PROCEDURE — 88305 TISSUE EXAM BY PATHOLOGIST: CPT | Mod: 26,,, | Performed by: PATHOLOGY

## 2020-01-14 PROCEDURE — 37000008 HC ANESTHESIA 1ST 15 MINUTES: Performed by: FAMILY MEDICINE

## 2020-01-14 PROCEDURE — 27201089 HC SNARE, DISP (ANY): Performed by: FAMILY MEDICINE

## 2020-01-14 PROCEDURE — 88305 TISSUE EXAM BY PATHOLOGIST: CPT | Performed by: PATHOLOGY

## 2020-01-14 RX ORDER — SODIUM CHLORIDE 0.9 % (FLUSH) 0.9 %
10 SYRINGE (ML) INJECTION
Status: DISCONTINUED | OUTPATIENT
Start: 2020-01-14 | End: 2020-01-14 | Stop reason: HOSPADM

## 2020-01-14 RX ORDER — PROPOFOL 10 MG/ML
VIAL (ML) INTRAVENOUS
Status: DISCONTINUED | OUTPATIENT
Start: 2020-01-14 | End: 2020-01-14

## 2020-01-14 RX ORDER — SODIUM CHLORIDE, SODIUM LACTATE, POTASSIUM CHLORIDE, CALCIUM CHLORIDE 600; 310; 30; 20 MG/100ML; MG/100ML; MG/100ML; MG/100ML
INJECTION, SOLUTION INTRAVENOUS CONTINUOUS
Status: DISCONTINUED | OUTPATIENT
Start: 2020-01-14 | End: 2020-01-14 | Stop reason: HOSPADM

## 2020-01-14 RX ADMIN — PROPOFOL 20 MG: 10 INJECTION, EMULSION INTRAVENOUS at 09:01

## 2020-01-14 RX ADMIN — SODIUM CHLORIDE, SODIUM LACTATE, POTASSIUM CHLORIDE, AND CALCIUM CHLORIDE: 600; 310; 30; 20 INJECTION, SOLUTION INTRAVENOUS at 09:01

## 2020-01-14 RX ADMIN — PROPOFOL 60 MG: 10 INJECTION, EMULSION INTRAVENOUS at 09:01

## 2020-01-14 NOTE — TRANSFER OF CARE
"Anesthesia Transfer of Care Note    Patient: Mallory David    Procedure(s) Performed: Procedure(s) (LRB):  COLONOSCOPY (N/A)    Patient location: GI    Anesthesia Type: MAC    Transport from OR: Transported from OR on room air with adequate spontaneous ventilation    Post pain: adequate analgesia    Post assessment: no apparent anesthetic complications    Post vital signs: stable    Level of consciousness: awake, alert and oriented    Nausea/Vomiting: no nausea/vomiting    Complications: none    Transfer of care protocol was followed      Last vitals:   Visit Vitals  /73 (BP Location: Left arm, Patient Position: Lying)   Pulse 71   Temp 36.5 °C (97.7 °F) (Temporal)   Resp 18   Ht 5' 2" (1.575 m)   Wt 77.8 kg (171 lb 8.3 oz)   LMP 09/20/2011   SpO2 100%   Breastfeeding? No   BMI 31.37 kg/m²     "

## 2020-01-14 NOTE — DISCHARGE SUMMARY
Endoscopy Discharge Summary      Admit Date: 1/14/2020    Discharge Date and Time:  1/14/2020 9:22 AM    Attending Physician: Latrell Banks MD     Discharge Physician: Latrell Banks MD     Principal Admitting Diagnoses: Special screening for malignant neoplasms, colon         Discharge Diagnosis: The primary encounter diagnosis was History of colon polyps. Diagnoses of Special screening for malignant neoplasms, colon, Polyp of colon, unspecified part of colon, unspecified type, and Hemorrhoids, unspecified hemorrhoid type were also pertinent to this visit.     Discharged Condition: Good    Indication for Admission: Special screening for malignant neoplasms, colon     Hospital Course: Patient was admitted for an inpatient procedure and tolerated the procedure well with no complications.    Significant Diagnostic Studies: Colonoscopy with hot snare polypectomy    Pathology (if any):  Specimen (12h ago, onward)    None          Estimated Blood Loss: 0 ml.    Discussed with: patient and family.    Disposition: Home.    Follow Up/Patient Instructions:   Current Discharge Medication List      CONTINUE these medications which have NOT CHANGED    Details   ANTIOX #11/OM3/DHA/EPA/LUT/NAMAN (50+ ADULT EYE HEALTH ORAL) Take by mouth.      aspirin 81 MG chewable tablet Take 81 mg by mouth once daily.        furosemide (LASIX) 40 MG tablet Take 1 tablet (40 mg total) by mouth 2 (two) times daily.  Qty: 180 tablet, Refills: 3    Associated Diagnoses: Essential hypertension      levothyroxine (SYNTHROID) 100 MCG tablet Take 1 tablet (100 mcg total) by mouth once daily.  Qty: 90 tablet, Refills: 3    Associated Diagnoses: Essential hypertension      MAGNESIUM ORAL Take by mouth once daily.      metoprolol succinate (TOPROL-XL) 50 MG 24 hr tablet Take 1 tablet (50 mg total) by mouth once daily.  Qty: 90 tablet, Refills: 3    Associated Diagnoses: Essential hypertension; Paroxysmal supraventricular tachycardia      potassium  chloride SA (K-DUR,KLOR-CON) 20 MEQ tablet Take 1 tablet (20 mEq total) by mouth once daily.  Qty: 90 tablet, Refills: 3    Comments: This prescription was filled today. Any refills authorized will be placed on file.  Associated Diagnoses: Essential hypertension      rosuvastatin (CRESTOR) 10 MG tablet Take 1 tablet (10 mg total) by mouth once daily.  Qty: 90 tablet, Refills: 3    Comments: This prescription was filled today. Any refills authorized will be placed on file.  Associated Diagnoses: Hyperlipidemia, unspecified hyperlipidemia type      cetirizine (ZYRTEC) 10 mg Cap Zyrtec   1 po q day             Discharge Procedure Orders   Diet general     Call MD for:  temperature >100.4     Call MD for:  persistent nausea and vomiting     Call MD for:  severe uncontrolled pain     Call MD for:  difficulty breathing, headache or visual disturbances     Call MD for:  redness, tenderness, or signs of infection (pain, swelling, redness, odor or green/yellow discharge around incision site)     Call MD for:  hives     Call MD for:  persistent dizziness or light-headedness     No dressing needed       Follow-up Information     Latrell Banks MD. Call in 2 weeks.    Specialty:  Family Medicine  Why:  To receive pathology results.  Contact information:  [id number] [facility] [place] [address] [phone]

## 2020-01-14 NOTE — DISCHARGE INSTRUCTIONS

## 2020-01-14 NOTE — [employer] PATIENT INSTRUCTIONS
Discharge Summary/Instructions after an Endoscopic Procedure  Patient Name: Mallory David  Patient MRN: [id number]  Patient YOB: 1955 Tuesday, January 14, 2020 Latrell Banks MD  RESTRICTIONS:  During your procedure today, you received medications for sedation.  These   medications may affect your judgment, balance and coordination.  Therefore,   for 24 hours, you have the following restrictions:   - DO NOT drive a car, operate machinery, make legal/financial decisions,   sign important papers or drink alcohol.    ACTIVITY:  Today: no heavy lifting, straining or running due to procedural   sedation/anesthesia.  The following day: return to full activity including work.  DIET:  Eat and drink normally unless instructed otherwise.     TREATMENT FOR COMMON SIDE EFFECTS:  - Mild abdominal pain, nausea, belching, bloating or excessive gas:  rest,   eat lightly and use a heating pad.  - Sore Throat: treat with throat lozenges and/or gargle with warm salt   water.  - Because air was used during the procedure, expelling large amounts of air   from your rectum or belching is normal.  - If a bowel prep was taken, you may not have a bowel movement for 1-3 days.    This is normal.  SYMPTOMS TO WATCH FOR AND REPORT TO YOUR PHYSICIAN:  1. Abdominal pain or bloating, other than gas cramps.  2. Chest pain.  3. Back pain.  4. Signs of infection such as: chills or fever occurring within 24 hours   after the procedure.  5. Rectal bleeding, which would show as bright red, maroon, or black stools.   (A tablespoon of blood from the rectum is not serious, especially if   hemorrhoids are present.)  6. Vomiting.  7. Weakness or dizziness.  GO DIRECTLY TO THE NEAREST EMERGENCY ROOM IF YOU HAVE ANY OF THE FOLLOWING:      Difficulty breathing              Chills and/or fever over 101 F   Persistent vomiting and/or vomiting blood   Severe abdominal pain   Severe chest pain   Black, tarry stools   Bleeding- more than one  tablespoon   Any other symptom or condition that you feel may need urgent attention  Your doctor recommends these additional instructions:  If any biopsies were taken, your doctors clinic will contact you in 1 to 2   weeks with any results.  - Patient has a contact number available for emergencies.  The signs and   symptoms of potential delayed complications were discussed with the   patient.  Return to normal activities tomorrow.  Written discharge   instructions were provided to the patient.   - Resume previous diet.   - Continue present medications.   - Await pathology results.   - Repeat colonoscopy in 5 years for surveillance.   - Telephone my office for pathology results in 2 weeks.   - Discharge patient to home (via wheelchair).  For questions, problems or results please call your physician Latrell Banks MD at Work:  [phone]  If you have any questions about the above instructions, call the GI   department at [phone] or call the endoscopy unit at [phone]   from 7am until 3 pm.  [facility], EMERGENCY ROOM PHONE NUMBER:   [phone]  IF A COMPLICATION OR EMERGENCY SITUATION ARISES AND YOU ARE UNABLE TO REACH   YOUR PHYSICIAN - GO DIRECTLY TO THE EMERGENCY ROOM.  I have read or have had read to me these discharge instructions for my   procedure and have received a written copy.  I understand these   instructions and will follow-up with my physician if I have any questions.     __________________________________       _____________________________________  Nurse Signature                                          Patient/Designated   Responsible Party Signature  Latrell Banks MD  1/14/2020 9:20:22 AM  This report has been verified and signed electronically.  [employer]

## 2020-01-14 NOTE — ANESTHESIA PREPROCEDURE EVALUATION
01/14/2020  Mallory David is a 64 y.o., female.    Anesthesia Evaluation    I have reviewed the Patient Summary Reports.    I have reviewed the Nursing Notes.   I have reviewed the Medications.     Review of Systems  Anesthesia Hx:  No problems with previous Anesthesia Denies Hx of Anesthetic complications  History of prior surgery of interest to airway management or planning: Denies Family Hx of Anesthesia complications.   Denies Personal Hx of Anesthesia complications.   Social:  Non-Smoker, No Alcohol Use    Hematology/Oncology:  Hematology Normal   Oncology Normal     EENT/Dental:EENT/Dental Normal   Cardiovascular:   Hypertension Dysrhythmias ECG has been reviewed. Aflutter ablation, ASD   Pulmonary:  Pulmonary Normal    Renal/:  Renal/ Normal     Hepatic/GI:   Liver Disease, Fatty liver   Musculoskeletal:  Musculoskeletal Normal    Neurological:  Neurology Normal    Endocrine:   Hyperthyroidism    Dermatological:  Skin Normal    Psych:  Psychiatric Normal           Physical Exam  General:  Well nourished    Airway/Jaw/Neck:  Airway Findings: Mouth Opening: Normal Tongue: Normal  General Airway Assessment: Adult  Mallampati: II  TM Distance: Normal, at least 6 cm                 Anesthesia Plan  Type of Anesthesia, risks & benefits discussed:  Anesthesia Type:  MAC  Patient's Preference:   Intra-op Monitoring Plan: standard ASA monitors  Intra-op Monitoring Plan Comments:   Post Op Pain Control Plan:   Post Op Pain Control Plan Comments:   Induction:   IV  Beta Blocker:  Patient is on a Beta-Blocker and has received one dose within the past 24 hours (No further documentation required).       Informed Consent: Patient understands risks and agrees with Anesthesia plan.  Questions answered. Anesthesia consent signed with patient.  ASA Score: 2     Day of Surgery Review of History & Physical: I have  interviewed and examined the patient. I have reviewed the patient's H&P dated:    H&P update referred to the provider.         Ready For Surgery From Anesthesia Perspective.

## 2020-01-14 NOTE — H&P
Short Stay Endoscopy History and Physical    PCP - Latrell Banks MD    Procedure - Colonoscopy  ASA - 2  Mallampati - per anesthesia  History of Anesthesia problems - no  Family history Anesthesia problems -  no     HPI:  This is a 64 y.o. female here for evaluation of :   Active Hospital Problems    Diagnosis  POA    *Special screening for malignant neoplasms, colon [Z12.11]  Not Applicable    History of colon polyps [Z86.010]  Not Applicable     The patient has had colon polyps in the past.  The next colonoscopy is due in Oct 2019.          Resolved Hospital Problems   No resolved problems to display.         Health Maintenance       Date Due Completion Date    Shingles Vaccine (1 of 2) 09/16/2006 ---    Colonoscopy 10/28/2019 10/28/2014    Override on 10/6/2009: Done    Mammogram 10/14/2020 10/14/2019    Override on 1/15/2015: Done (date is approximate)    Override on 12/30/2013: Done (date is approximate)    Override on 1/30/2013: (N/S)    Pap Smear with HPV Cotest 08/28/2022 8/28/2019    Lipid Panel 08/26/2024 8/26/2019    TETANUS VACCINE 06/07/2028 6/7/2018          Screening - yes  History of polyps - yes  Diarrhea - no  Anemia - no  Blood in stools - no  Abdominal pain - no  Other - no    ROS:  CONSTITUTIONAL: Denies weight change,  fatigue, fevers, chills, night sweats.  CARDIOVASCULAR: Denies chest pain, shortness of breath, orthopnea and edema.  RESPIRATORY: Denies cough, hemoptysis, dyspnea, and wheezing.  GI: See HPI.    Medical History:   Past Medical History:   Diagnosis Date    *Atrial flutter     s/p ablation    Abnormal Pap smear of cervix     ASD (atrial septal defect), ostium secundum     repaired    Benign hypertensive heart disease without heart failure     Endometriosis     Fatty liver     Hyperlipidemia     Hypertension     2009    Nodular goiter (nontoxic)     1999    Special screening for malignant neoplasms, colon 10/28/2014       Surgical History:   Past Surgical History:    Procedure Laterality Date    ABLATION OF DYSRHYTHMIC FOCUS      ASD REPAIR, SECUNDUM      CARPAL TUNNEL RELEASE  2007    right hand    EYE SURGERY      Heart ablation  2011    OOPHORECTOMY      Open Heart Surgery  1970    OVARIAN CYST SURGERY  1981       Family History:   Family History   Problem Relation Age of Onset    Hypertension Mother     Cancer Father     Diabetes Father     Breast cancer Sister     Aneurysm Brother         this was of his heart.    Melanoma Neg Hx        Social History:   Social History     Tobacco Use    Smoking status: Never Smoker    Smokeless tobacco: Never Used   Substance Use Topics    Alcohol use: No    Drug use: No       Allergies:   Review of patient's allergies indicates:   Allergen Reactions    No known drug allergies        Medications:   No current facility-administered medications on file prior to encounter.      Current Outpatient Medications on File Prior to Encounter   Medication Sig Dispense Refill    ANTIOX #11/OM3/DHA/EPA/LUT/NAMAN (50+ ADULT EYE HEALTH ORAL) Take by mouth.      aspirin 81 MG chewable tablet Take 81 mg by mouth once daily.        furosemide (LASIX) 40 MG tablet Take 1 tablet (40 mg total) by mouth 2 (two) times daily. 180 tablet 3    MAGNESIUM ORAL Take by mouth once daily.      potassium chloride SA (K-DUR,KLOR-CON) 20 MEQ tablet Take 1 tablet (20 mEq total) by mouth once daily. 90 tablet 3    rosuvastatin (CRESTOR) 10 MG tablet Take 1 tablet (10 mg total) by mouth once daily. 90 tablet 3    cetirizine (ZYRTEC) 10 mg Cap Zyrtec   1 po q day         Physical Exam:  Vital Signs: see nurses notes.  General Appearance: Well appearing in no acute distress  ENT: OP clear  Chest: CTA B  CV: RRR, no m/r/g  Abd: s/nt/nd/nabs  Ext: no edema    Labs:Reviewed    IMP:  Active Hospital Problems    Diagnosis  POA    *Special screening for malignant neoplasms, colon [Z12.11]  Not Applicable    History of colon polyps [Z86.010]  Not Applicable      The patient has had colon polyps in the past.  The next colonoscopy is due in Oct 2019.          Resolved Hospital Problems   No resolved problems to display.         Plan:   I have explained the risks and benefits of colonoscopy to the patient including but not limited to bleeding, perforation, infection, and death. The patient wishes to proceed.

## 2020-01-14 NOTE — ANESTHESIA POSTPROCEDURE EVALUATION
Anesthesia Post Evaluation    Patient: Mallory David    Procedure(s) Performed: Procedure(s) (LRB):  COLONOSCOPY (N/A)    Final Anesthesia Type: MAC    Patient location during evaluation: GI PACU  Patient participation: Yes- Able to Participate  Level of consciousness: awake and alert and oriented  Post-procedure vital signs: reviewed and stable  Pain management: adequate  Airway patency: patent    PONV status at discharge: No PONV  Anesthetic complications: no      Cardiovascular status: blood pressure returned to baseline, stable and hemodynamically stable  Respiratory status: unassisted, room air and spontaneous ventilation  Hydration status: euvolemic  Follow-up not needed.          Vitals Value Taken Time   /62 1/14/2020  9:41 AM   Temp 36.5 °C (97.7 °F) 1/14/2020  8:24 AM   Pulse 65 1/14/2020  9:41 AM   Resp 16 1/14/2020  9:41 AM   SpO2 96 % 1/14/2020  9:41 AM         Event Time     Out of Recovery 09:53:33          Pain/Luly Score: Luly Score: 9 (1/14/2020  9:41 AM)

## 2020-01-21 LAB
FINAL PATHOLOGIC DIAGNOSIS: NORMAL
GROSS: NORMAL

## 2020-01-23 NOTE — PROGRESS NOTES
Dear Latrell Banks MD,    I recently cared for Mallory David and performed an endoscopy.  Tissue was sent for pathology evaluation and I will have a letter written to ask the patient to repeat the colonoscopy in 5 years.  The pathology showed that there was adenomatous tissue present.  Thank you for allowing me to participate in the care of your patient.  Please call me for any questions that you might have.      Dr. Latrell Banks  [phone] cell  [phone] office      NURSING STAFF:Please  inform the patient that I reviewed the recent pathology obtained at the time of colonoscopy.    The results showed that there was adenomatous tissue present which is benign and based on that, I recommend that the patient have a repeat colonoscopy performed in 5 years.     If the patient has MyChart, this message has been sent to them.  Confirm that they read the note.  If not, copy the information and print a letter to send to the patient at this time.  Confirm that a notation to the PCP was done.      Dear Mallory David,    This is to inform you that I have reviewed your recent colonoscopy pathology.  The results showed that you had adenomatous tissue present which is benign and based on that, I recommend that you have a repeat colonoscopy performed in 5 years.      Dr. Latrell Banks  [phone]

## 2020-02-22 DIAGNOSIS — I47.10 PAROXYSMAL SUPRAVENTRICULAR TACHYCARDIA: ICD-10-CM

## 2020-02-22 DIAGNOSIS — I10 ESSENTIAL HYPERTENSION: ICD-10-CM

## 2020-02-24 RX ORDER — METOPROLOL SUCCINATE 50 MG/1
50 TABLET, EXTENDED RELEASE ORAL DAILY
Qty: 90 TABLET | Refills: 3 | Status: SHIPPED | OUTPATIENT
Start: 2020-02-24 | End: 2020-10-02 | Stop reason: SDUPTHER

## 2020-02-24 RX ORDER — LEVOTHYROXINE SODIUM 100 UG/1
100 TABLET ORAL DAILY
Qty: 90 TABLET | Refills: 3 | Status: SHIPPED | OUTPATIENT
Start: 2020-02-24 | End: 2020-10-02 | Stop reason: SDUPTHER

## 2020-05-25 ENCOUNTER — PATIENT MESSAGE (OUTPATIENT)
Dept: DERMATOLOGY | Facility: CLINIC | Age: 65
End: 2020-05-25

## 2020-06-30 ENCOUNTER — TELEPHONE (OUTPATIENT)
Dept: FAMILY MEDICINE | Facility: CLINIC | Age: 65
End: 2020-06-30

## 2020-06-30 ENCOUNTER — OFFICE VISIT (OUTPATIENT)
Dept: FAMILY MEDICINE | Facility: CLINIC | Age: 65
End: 2020-06-30
Payer: COMMERCIAL

## 2020-06-30 VITALS
HEART RATE: 70 BPM | DIASTOLIC BLOOD PRESSURE: 61 MMHG | WEIGHT: 176 LBS | BODY MASS INDEX: 32.39 KG/M2 | SYSTOLIC BLOOD PRESSURE: 116 MMHG | HEIGHT: 62 IN | TEMPERATURE: 98 F

## 2020-06-30 DIAGNOSIS — Z01.818 PRE-OP EXAMINATION: Primary | ICD-10-CM

## 2020-06-30 PROCEDURE — 99999 PR PBB SHADOW E&M-EST. PATIENT-LVL IV: CPT | Mod: PBBFAC,,, | Performed by: NURSE PRACTITIONER

## 2020-06-30 PROCEDURE — 99213 PR OFFICE/OUTPT VISIT, EST, LEVL III, 20-29 MIN: ICD-10-PCS | Mod: S$GLB,,, | Performed by: NURSE PRACTITIONER

## 2020-06-30 PROCEDURE — 3074F SYST BP LT 130 MM HG: CPT | Mod: CPTII,S$GLB,, | Performed by: NURSE PRACTITIONER

## 2020-06-30 PROCEDURE — 93010 EKG 12-LEAD: ICD-10-PCS | Mod: S$GLB,,, | Performed by: INTERNAL MEDICINE

## 2020-06-30 PROCEDURE — 3008F BODY MASS INDEX DOCD: CPT | Mod: CPTII,S$GLB,, | Performed by: NURSE PRACTITIONER

## 2020-06-30 PROCEDURE — 93010 ELECTROCARDIOGRAM REPORT: CPT | Mod: S$GLB,,, | Performed by: INTERNAL MEDICINE

## 2020-06-30 PROCEDURE — 3078F PR MOST RECENT DIASTOLIC BLOOD PRESSURE < 80 MM HG: ICD-10-PCS | Mod: CPTII,S$GLB,, | Performed by: NURSE PRACTITIONER

## 2020-06-30 PROCEDURE — 3074F PR MOST RECENT SYSTOLIC BLOOD PRESSURE < 130 MM HG: ICD-10-PCS | Mod: CPTII,S$GLB,, | Performed by: NURSE PRACTITIONER

## 2020-06-30 PROCEDURE — 93005 EKG 12-LEAD: ICD-10-PCS | Mod: S$GLB,,, | Performed by: NURSE PRACTITIONER

## 2020-06-30 PROCEDURE — 99213 OFFICE O/P EST LOW 20 MIN: CPT | Mod: S$GLB,,, | Performed by: NURSE PRACTITIONER

## 2020-06-30 PROCEDURE — 93005 ELECTROCARDIOGRAM TRACING: CPT | Mod: S$GLB,,, | Performed by: NURSE PRACTITIONER

## 2020-06-30 PROCEDURE — 3008F PR BODY MASS INDEX (BMI) DOCUMENTED: ICD-10-PCS | Mod: CPTII,S$GLB,, | Performed by: NURSE PRACTITIONER

## 2020-06-30 PROCEDURE — 99999 PR PBB SHADOW E&M-EST. PATIENT-LVL IV: ICD-10-PCS | Mod: PBBFAC,,, | Performed by: NURSE PRACTITIONER

## 2020-06-30 PROCEDURE — 3078F DIAST BP <80 MM HG: CPT | Mod: CPTII,S$GLB,, | Performed by: NURSE PRACTITIONER

## 2020-06-30 RX ORDER — FLUTICASONE PROPIONATE 50 MCG
1 SPRAY, SUSPENSION (ML) NASAL DAILY
COMMUNITY
End: 2021-10-15

## 2020-06-30 NOTE — PROGRESS NOTES
Subjective:       Patient ID: Mallorychiara David is a 64 y.o. female.    Chief Complaint: Pre-op Exam  Pt in today for pre-op exam for cataract removal. Pt denies any previous anesthesia complications. BP WNL today. EKG ordered. History of atrial flutter, ASD repair. No atrial flutter since ablation per pt. Pt has no other complaints today.  Past Medical History:   Diagnosis Date    *Atrial flutter     s/p ablation    Abnormal Pap smear of cervix     ASD (atrial septal defect), ostium secundum     repaired    Benign hypertensive heart disease without heart failure     Endometriosis     Fatty liver     Hyperlipidemia     Hypertension     2009    Nodular goiter (nontoxic)     1999    Special screening for malignant neoplasms, colon 10/28/2014       HPI  Review of Systems   Constitutional: Negative.  Negative for activity change and unexpected weight change.   HENT: Negative.  Negative for hearing loss, rhinorrhea and trouble swallowing.    Eyes: Negative.  Negative for discharge.   Respiratory: Negative.  Negative for chest tightness and wheezing.    Cardiovascular: Negative.  Negative for chest pain and palpitations.   Gastrointestinal: Negative.  Negative for blood in stool, constipation, diarrhea and vomiting.   Endocrine: Negative.  Negative for polydipsia and polyuria.   Genitourinary: Negative.  Negative for difficulty urinating, dysuria, hematuria and menstrual problem.   Musculoskeletal: Negative.  Negative for arthralgias, joint swelling and neck pain.   Integumentary:  Negative.   Allergic/Immunologic: Negative.    Neurological: Negative.  Negative for weakness and headaches.   Psychiatric/Behavioral: Negative.  Negative for confusion and dysphoric mood.         Objective:      Physical Exam  Vitals signs and nursing note reviewed.   Constitutional:       Appearance: Normal appearance.   HENT:      Head: Normocephalic.      Right Ear: Tympanic membrane, ear canal and external ear normal.      Nose:  Nose normal.      Mouth/Throat:      Mouth: Mucous membranes are moist.      Pharynx: Oropharynx is clear.   Eyes:      Conjunctiva/sclera: Conjunctivae normal.      Pupils: Pupils are equal, round, and reactive to light.   Neck:      Musculoskeletal: Normal range of motion and neck supple.   Cardiovascular:      Rate and Rhythm: Normal rate and regular rhythm.      Pulses: Normal pulses.      Heart sounds: Normal heart sounds.   Pulmonary:      Effort: Pulmonary effort is normal.      Breath sounds: Normal breath sounds.   Abdominal:      General: Bowel sounds are normal.      Palpations: Abdomen is soft.   Musculoskeletal: Normal range of motion.   Skin:     General: Skin is warm and dry.      Capillary Refill: Capillary refill takes 2 to 3 seconds.   Neurological:      Mental Status: She is alert and oriented to person, place, and time.   Psychiatric:         Mood and Affect: Mood normal.         Behavior: Behavior normal.         Thought Content: Thought content normal.         Judgment: Judgment normal.         Assessment:       1. Pre-op examination        Plan:           Mallory was seen today for pre-op exam.    Diagnoses and all orders for this visit:    Pre-op examination  Comments:  Cataract  Orders:  -     IN OFFICE EKG 12-LEAD (to Muse)  Pt cleared for cataract surgery

## 2020-06-30 NOTE — TELEPHONE ENCOUNTER
Pt cleared for cataract surgery. Please call pt and inform paperwork ready. Please fax requested information to surgeon.

## 2020-07-21 ENCOUNTER — OFFICE VISIT (OUTPATIENT)
Dept: DERMATOLOGY | Facility: CLINIC | Age: 65
End: 2020-07-21
Payer: COMMERCIAL

## 2020-07-21 DIAGNOSIS — Z12.83 SKIN CANCER SCREENING: Primary | ICD-10-CM

## 2020-07-21 DIAGNOSIS — L82.1 SEBORRHEIC KERATOSES: ICD-10-CM

## 2020-07-21 DIAGNOSIS — D22.9 MULTIPLE BENIGN NEVI: ICD-10-CM

## 2020-07-21 DIAGNOSIS — L82.0 INFLAMED SEBORRHEIC KERATOSIS: ICD-10-CM

## 2020-07-21 PROCEDURE — 17110 DESTRUCTION B9 LES UP TO 14: CPT | Mod: S$GLB,,, | Performed by: DERMATOLOGY

## 2020-07-21 PROCEDURE — 17110 PR DESTRUCTION BENIGN LESIONS UP TO 14: ICD-10-PCS | Mod: S$GLB,,, | Performed by: DERMATOLOGY

## 2020-07-21 PROCEDURE — 99999 PR PBB SHADOW E&M-EST. PATIENT-LVL II: ICD-10-PCS | Mod: PBBFAC,,, | Performed by: DERMATOLOGY

## 2020-07-21 PROCEDURE — 99999 PR PBB SHADOW E&M-EST. PATIENT-LVL II: CPT | Mod: PBBFAC,,, | Performed by: DERMATOLOGY

## 2020-07-21 PROCEDURE — 99214 PR OFFICE/OUTPT VISIT, EST, LEVL IV, 30-39 MIN: ICD-10-PCS | Mod: 25,S$GLB,, | Performed by: DERMATOLOGY

## 2020-07-21 PROCEDURE — 99214 OFFICE O/P EST MOD 30 MIN: CPT | Mod: 25,S$GLB,, | Performed by: DERMATOLOGY

## 2020-07-21 NOTE — PROGRESS NOTES
Subjective:       Patient ID:  Malloryleslee David is a 64 y.o. female who presents for   Chief Complaint   Patient presents with    Skin Check     Patient present for skin check, last seen on 11/27/2019.     C/o lesions to back. The patient denies any change, including change in color, increase in size, or spontaneous bleeding, associated with this lesion. Not treating    no Phx of NMSC.    Yes Fhx of NMSC  + Mother and Father hx of BCC    no Fhx of melanoma.    Past Medical History:  No date: *Atrial flutter      Comment:  s/p ablation  No date: Abnormal Pap smear of cervix  No date: ASD (atrial septal defect), ostium secundum      Comment:  repaired  No date: Benign hypertensive heart disease without heart failure  No date: Endometriosis  No date: Fatty liver  No date: Hyperlipidemia  No date: Hypertension      Comment:  2009  No date: Nodular goiter (nontoxic)      Comment:  1999  10/28/2014: Special screening for malignant neoplasms, colon        Review of Systems   Constitutional: Negative for fever, chills and fatigue.   Skin: Positive for daily sunscreen use, activity-related sunscreen use and wears hat.   Hematologic/Lymphatic: Does not bruise/bleed easily.        Objective:    Physical Exam   Constitutional: She appears well-developed and well-nourished. No distress.   HENT:   Mouth/Throat: Lips normal.    Eyes: Lids are normal.  No conjunctival no injection.   Cardiovascular: There is no local extremity swelling and no dependent edema.     Neurological: She is alert and oriented to person, place, and time. She is not disoriented.   Psychiatric: She has a normal mood and affect.   Skin:   Areas Examined (abnormalities noted in diagram):   Head / Face Inspection Performed  Neck Inspection Performed  Chest / Axilla Inspection Performed  Abdomen Inspection Performed  Genitals / Buttocks / Groin Inspection Performed  Back Inspection Performed  RUE Inspected  LUE Inspection Performed  RLE Inspected  LLE  Inspection Performed  Nails and Digits Inspection Performed                   Diagram Legend     Erythematous scaling macule/papule c/w actinic keratosis       Vascular papule c/w angioma      Pigmented verrucoid papule/plaque c/w seborrheic keratosis      Yellow umbilicated papule c/w sebaceous hyperplasia      Irregularly shaped tan macule c/w lentigo     1-2 mm smooth white papules consistent with Milia      Movable subcutaneous cyst with punctum c/w epidermal inclusion cyst      Subcutaneous movable cyst c/w pilar cyst      Firm pink to brown papule c/w dermatofibroma      Pedunculated fleshy papule(s) c/w skin tag(s)      Evenly pigmented macule c/w junctional nevus     Mildly variegated pigmented, slightly irregular-bordered macule c/w mildly atypical nevus      Flesh colored to evenly pigmented papule c/w intradermal nevus       Pink pearly papule/plaque c/w basal cell carcinoma      Erythematous hyperkeratotic cursted plaque c/w SCC      Surgical scar with no sign of skin cancer recurrence      Open and closed comedones      Inflammatory papules and pustules      Verrucoid papule consistent consistent with wart     Erythematous eczematous patches and plaques     Dystrophic onycholytic nail with subungual debris c/w onychomycosis     Umbilicated papule    Erythematous-base heme-crusted tan verrucoid plaque consistent with inflamed seborrheic keratosis     Erythematous Silvery Scaling Plaque c/w Psoriasis     See annotation      Assessment / Plan:        Skin cancer screening      Total body skin examination performed today including at least 12 points as noted in physical examination. No lesions suspicious for malignancy noted.    Multiple benign nevi  Discussed ABCDE's of nevi.  Monitor for new mole or moles that are becoming bigger, darker, irritated, or developing irregular borders.       Seborrheic keratoses, trunk and extremities  These are benign inherited growths without a malignant potential.  Reassurance given to patient. No treatment is necessary.       Inflamed seborrheic keratosis  Cryosurgery procedure note:    Verbal consent from the patient is obtained. Liquid nitrogen cryosurgery is applied to 1 lesions to produce a freeze injury. The patient is aware that blisters may form and is instructed on wound care with gentle cleansing and use of vaseline ointment to keep moist until healed. The patient is supplied a handout on cryosurgery and is instructed to call if lesions do not completely resolve. Risk of dyspigmentation discussed.                Follow up in about 6 months (around 1/21/2021).

## 2020-08-03 ENCOUNTER — OFFICE VISIT (OUTPATIENT)
Dept: FAMILY MEDICINE | Facility: CLINIC | Age: 65
End: 2020-08-03
Payer: COMMERCIAL

## 2020-08-03 ENCOUNTER — LAB VISIT (OUTPATIENT)
Dept: LAB | Facility: HOSPITAL | Age: 65
End: 2020-08-03
Attending: NURSE PRACTITIONER
Payer: COMMERCIAL

## 2020-08-03 VITALS
DIASTOLIC BLOOD PRESSURE: 72 MMHG | SYSTOLIC BLOOD PRESSURE: 124 MMHG | WEIGHT: 176 LBS | HEART RATE: 74 BPM | BODY MASS INDEX: 32.39 KG/M2 | TEMPERATURE: 98 F | HEIGHT: 62 IN

## 2020-08-03 DIAGNOSIS — R25.2 MUSCLE CRAMPS: ICD-10-CM

## 2020-08-03 DIAGNOSIS — E03.9 HYPOTHYROIDISM, UNSPECIFIED TYPE: ICD-10-CM

## 2020-08-03 DIAGNOSIS — K52.9 GASTROENTERITIS: ICD-10-CM

## 2020-08-03 DIAGNOSIS — R25.2 MUSCLE CRAMPS: Primary | ICD-10-CM

## 2020-08-03 PROCEDURE — 84443 ASSAY THYROID STIM HORMONE: CPT

## 2020-08-03 PROCEDURE — 99214 OFFICE O/P EST MOD 30 MIN: CPT | Mod: S$GLB,,, | Performed by: NURSE PRACTITIONER

## 2020-08-03 PROCEDURE — 83735 ASSAY OF MAGNESIUM: CPT

## 2020-08-03 PROCEDURE — 36415 COLL VENOUS BLD VENIPUNCTURE: CPT | Mod: PO

## 2020-08-03 PROCEDURE — 99999 PR PBB SHADOW E&M-EST. PATIENT-LVL IV: ICD-10-PCS | Mod: PBBFAC,,, | Performed by: NURSE PRACTITIONER

## 2020-08-03 PROCEDURE — 3074F SYST BP LT 130 MM HG: CPT | Mod: CPTII,S$GLB,, | Performed by: NURSE PRACTITIONER

## 2020-08-03 PROCEDURE — 3008F PR BODY MASS INDEX (BMI) DOCUMENTED: ICD-10-PCS | Mod: CPTII,S$GLB,, | Performed by: NURSE PRACTITIONER

## 2020-08-03 PROCEDURE — 3078F PR MOST RECENT DIASTOLIC BLOOD PRESSURE < 80 MM HG: ICD-10-PCS | Mod: CPTII,S$GLB,, | Performed by: NURSE PRACTITIONER

## 2020-08-03 PROCEDURE — 99999 PR PBB SHADOW E&M-EST. PATIENT-LVL IV: CPT | Mod: PBBFAC,,, | Performed by: NURSE PRACTITIONER

## 2020-08-03 PROCEDURE — 3078F DIAST BP <80 MM HG: CPT | Mod: CPTII,S$GLB,, | Performed by: NURSE PRACTITIONER

## 2020-08-03 PROCEDURE — 99214 PR OFFICE/OUTPT VISIT, EST, LEVL IV, 30-39 MIN: ICD-10-PCS | Mod: S$GLB,,, | Performed by: NURSE PRACTITIONER

## 2020-08-03 PROCEDURE — 3074F PR MOST RECENT SYSTOLIC BLOOD PRESSURE < 130 MM HG: ICD-10-PCS | Mod: CPTII,S$GLB,, | Performed by: NURSE PRACTITIONER

## 2020-08-03 PROCEDURE — 80048 BASIC METABOLIC PNL TOTAL CA: CPT

## 2020-08-03 PROCEDURE — 3008F BODY MASS INDEX DOCD: CPT | Mod: CPTII,S$GLB,, | Performed by: NURSE PRACTITIONER

## 2020-08-03 RX ORDER — CIPROFLOXACIN 500 MG/1
500 TABLET ORAL EVERY 12 HOURS
Qty: 10 TABLET | Refills: 0 | Status: SHIPPED | OUTPATIENT
Start: 2020-08-03 | End: 2020-08-08

## 2020-08-03 RX ORDER — DUREZOL 0.5 MG/ML
EMULSION OPHTHALMIC
COMMUNITY
Start: 2020-06-18 | End: 2020-10-02

## 2020-08-03 RX ORDER — NEPAFENAC 3 MG/ML
SUSPENSION/ DROPS OPHTHALMIC
COMMUNITY
Start: 2020-06-18 | End: 2020-10-02

## 2020-08-03 RX ORDER — OFLOXACIN 3 MG/ML
SOLUTION/ DROPS OPHTHALMIC
COMMUNITY
Start: 2020-07-23 | End: 2020-10-02

## 2020-08-03 NOTE — PROGRESS NOTES
Subjective:       Patient ID: Mallorychiara David is a 64 y.o. female.    Chief Complaint: Abdominal Pain    Abdominal Pain  This is a new problem. The current episode started yesterday. The onset quality is sudden. The pain is located in the LLQ and LUQ. Pertinent negatives include no arthralgias, diarrhea, fever, headaches, myalgias or vomiting. She has tried nothing for the symptoms. There is no history of colon cancer, Crohn's disease, gallstones, GERD, pancreatitis, PUD or ulcerative colitis. Patient's medical history does not include kidney stones.   Thyroid Problem  Presents for follow-up visit. Patient reports no anxiety, diarrhea, fatigue or palpitations. (Muscle cramps) The symptoms have been stable.       Review of Systems   Constitutional: Negative for fatigue, fever and unexpected weight change.   HENT: Negative for ear pain and sore throat.    Eyes: Negative for pain and visual disturbance.   Respiratory: Negative for cough and shortness of breath.    Cardiovascular: Negative for chest pain and palpitations.   Gastrointestinal: Positive for abdominal pain. Negative for diarrhea and vomiting.   Musculoskeletal: Negative for arthralgias and myalgias.   Skin: Negative for color change and rash.   Neurological: Negative for dizziness and headaches.   Psychiatric/Behavioral: Negative for dysphoric mood and sleep disturbance. The patient is not nervous/anxious.        Vitals:    08/03/20 1247   BP: 124/72   Pulse: 74   Temp: 97.5 °F (36.4 °C)       Objective:     Current Outpatient Medications   Medication Sig Dispense Refill    ANTIOX #11/OM3/DHA/EPA/LUT/NAMAN (50+ ADULT EYE HEALTH ORAL) Take by mouth.      aspirin 81 MG chewable tablet Take 81 mg by mouth once daily.        cetirizine (ZYRTEC) 10 mg Cap Zyrtec   1 po q day      DUREZOL 0.05 % Drop ophthalmic solution Instill 1 drop in operated eye twice a day Start after surgery      fluticasone propionate (FLONASE) 50 mcg/actuation nasal spray 1 spray by  Each Nostril route once daily.      furosemide (LASIX) 40 MG tablet Take 1 tablet (40 mg total) by mouth 2 (two) times daily. 60 tablet 11    ILEVRO 0.3 % DrpS Instill 1 drop in operated eye once a day Start 3 days before surgery      levothyroxine (SYNTHROID) 100 MCG tablet Take 1 tablet (100 mcg total) by mouth once daily. 90 tablet 3    MAGNESIUM ORAL Take by mouth once daily.      metoprolol succinate (TOPROL-XL) 50 MG 24 hr tablet Take 1 tablet (50 mg total) by mouth once daily. 90 tablet 3    ofloxacin (OCUFLOX) 0.3 % ophthalmic solution Instill 1 drop in operated eye 4 times a day Start 3 days before surgery      potassium chloride SA (K-DUR,KLOR-CON) 20 MEQ tablet Take 1 tablet (20 mEq total) by mouth once daily. 90 tablet 3    rosuvastatin (CRESTOR) 10 MG tablet Take 1 tablet (10 mg total) by mouth once daily. 90 tablet 3     No current facility-administered medications for this visit.        Physical Exam  Vitals signs and nursing note reviewed.   Constitutional:       General: She is not in acute distress.     Appearance: She is well-developed.   HENT:      Head: Normocephalic and atraumatic.   Eyes:      Pupils: Pupils are equal, round, and reactive to light.   Neck:      Musculoskeletal: Normal range of motion and neck supple.   Cardiovascular:      Rate and Rhythm: Normal rate and regular rhythm.   Pulmonary:      Effort: Pulmonary effort is normal.      Breath sounds: Normal breath sounds.   Musculoskeletal: Normal range of motion.   Skin:     General: Skin is warm and dry.      Findings: No rash.   Neurological:      Mental Status: She is alert and oriented to person, place, and time.   Psychiatric:         Judgment: Judgment normal.         Assessment:       1. Muscle cramps    2. Hypothyroidism, unspecified type    3. Gastroenteritis        Plan:   Muscle cramps  -     TSH; Future; Expected date: 08/03/2020  -     Basic metabolic panel; Future; Expected date: 08/03/2020  -     Magnesium;  Future; Expected date: 08/03/2020    Hypothyroidism, unspecified type  -     TSH; Future; Expected date: 08/03/2020    Gastroenteritis    Other orders  -     ciprofloxacin HCl (CIPRO) 500 MG tablet; Take 1 tablet (500 mg total) by mouth every 12 (twelve) hours. for 5 days  Dispense: 10 tablet; Refill: 0        No follow-ups on file.    There are no Patient Instructions on file for this visit.

## 2020-08-04 LAB
ANION GAP SERPL CALC-SCNC: 10 MMOL/L (ref 8–16)
BUN SERPL-MCNC: 22 MG/DL (ref 8–23)
CALCIUM SERPL-MCNC: 10.1 MG/DL (ref 8.7–10.5)
CHLORIDE SERPL-SCNC: 104 MMOL/L (ref 95–110)
CO2 SERPL-SCNC: 28 MMOL/L (ref 23–29)
CREAT SERPL-MCNC: 0.9 MG/DL (ref 0.5–1.4)
EST. GFR  (AFRICAN AMERICAN): >60 ML/MIN/1.73 M^2
EST. GFR  (NON AFRICAN AMERICAN): >60 ML/MIN/1.73 M^2
GLUCOSE SERPL-MCNC: 100 MG/DL (ref 70–110)
MAGNESIUM SERPL-MCNC: 2.2 MG/DL (ref 1.6–2.6)
POTASSIUM SERPL-SCNC: 3.7 MMOL/L (ref 3.5–5.1)
SODIUM SERPL-SCNC: 142 MMOL/L (ref 136–145)
TSH SERPL DL<=0.005 MIU/L-ACNC: 3.46 UIU/ML (ref 0.4–4)

## 2020-09-15 ENCOUNTER — PATIENT OUTREACH (OUTPATIENT)
Dept: ADMINISTRATIVE | Facility: HOSPITAL | Age: 65
End: 2020-09-15

## 2020-09-22 ENCOUNTER — OFFICE VISIT (OUTPATIENT)
Dept: CARDIOLOGY | Facility: CLINIC | Age: 65
End: 2020-09-22
Payer: COMMERCIAL

## 2020-09-22 VITALS
HEART RATE: 60 BPM | OXYGEN SATURATION: 95 % | BODY MASS INDEX: 32.61 KG/M2 | HEIGHT: 62 IN | SYSTOLIC BLOOD PRESSURE: 122 MMHG | WEIGHT: 177.19 LBS | DIASTOLIC BLOOD PRESSURE: 72 MMHG

## 2020-09-22 DIAGNOSIS — I47.10 PAROXYSMAL SUPRAVENTRICULAR TACHYCARDIA: ICD-10-CM

## 2020-09-22 DIAGNOSIS — I10 ESSENTIAL HYPERTENSION: Primary | ICD-10-CM

## 2020-09-22 DIAGNOSIS — E78.00 PURE HYPERCHOLESTEROLEMIA: ICD-10-CM

## 2020-09-22 PROCEDURE — 3078F PR MOST RECENT DIASTOLIC BLOOD PRESSURE < 80 MM HG: ICD-10-PCS | Mod: CPTII,S$GLB,, | Performed by: INTERNAL MEDICINE

## 2020-09-22 PROCEDURE — 99214 OFFICE O/P EST MOD 30 MIN: CPT | Mod: S$GLB,,, | Performed by: INTERNAL MEDICINE

## 2020-09-22 PROCEDURE — 3074F PR MOST RECENT SYSTOLIC BLOOD PRESSURE < 130 MM HG: ICD-10-PCS | Mod: CPTII,S$GLB,, | Performed by: INTERNAL MEDICINE

## 2020-09-22 PROCEDURE — 99999 PR PBB SHADOW E&M-EST. PATIENT-LVL III: ICD-10-PCS | Mod: PBBFAC,,, | Performed by: INTERNAL MEDICINE

## 2020-09-22 PROCEDURE — 3008F PR BODY MASS INDEX (BMI) DOCUMENTED: ICD-10-PCS | Mod: CPTII,S$GLB,, | Performed by: INTERNAL MEDICINE

## 2020-09-22 PROCEDURE — 3078F DIAST BP <80 MM HG: CPT | Mod: CPTII,S$GLB,, | Performed by: INTERNAL MEDICINE

## 2020-09-22 PROCEDURE — 99214 PR OFFICE/OUTPT VISIT, EST, LEVL IV, 30-39 MIN: ICD-10-PCS | Mod: S$GLB,,, | Performed by: INTERNAL MEDICINE

## 2020-09-22 PROCEDURE — 3074F SYST BP LT 130 MM HG: CPT | Mod: CPTII,S$GLB,, | Performed by: INTERNAL MEDICINE

## 2020-09-22 PROCEDURE — 99999 PR PBB SHADOW E&M-EST. PATIENT-LVL III: CPT | Mod: PBBFAC,,, | Performed by: INTERNAL MEDICINE

## 2020-09-22 PROCEDURE — 3008F BODY MASS INDEX DOCD: CPT | Mod: CPTII,S$GLB,, | Performed by: INTERNAL MEDICINE

## 2020-09-28 DIAGNOSIS — I10 ESSENTIAL HYPERTENSION: ICD-10-CM

## 2020-09-28 DIAGNOSIS — E78.5 HYPERLIPIDEMIA, UNSPECIFIED HYPERLIPIDEMIA TYPE: ICD-10-CM

## 2020-09-28 RX ORDER — POTASSIUM CHLORIDE 20 MEQ/1
20 TABLET, EXTENDED RELEASE ORAL DAILY
Qty: 90 TABLET | Refills: 0 | Status: SHIPPED | OUTPATIENT
Start: 2020-09-28 | End: 2020-10-02 | Stop reason: SDUPTHER

## 2020-09-28 RX ORDER — ROSUVASTATIN CALCIUM 10 MG/1
10 TABLET, COATED ORAL DAILY
Qty: 90 TABLET | Refills: 0 | Status: SHIPPED | OUTPATIENT
Start: 2020-09-28 | End: 2020-10-02 | Stop reason: SDUPTHER

## 2020-09-28 NOTE — TELEPHONE ENCOUNTER
Book a lipid/CMP on the patient.  If the last visit below was over a year, book a physical with the NP and if over 2 years, book with me.  Health Maintenance Due   Topic Date Due    HIV Screening  09/16/1970    Shingles Vaccine (1 of 2) 09/16/2006    Influenza Vaccine (1) 08/01/2020    Mammogram  10/14/2020       Lab Results   Component Value Date    CHOL 154 08/26/2019    CHOL 196 06/07/2018    CHOL 161 04/03/2017     Lab Results   Component Value Date    HDL 69 08/26/2019    HDL 76 (H) 06/07/2018    HDL 66 04/03/2017     Lab Results   Component Value Date    LDLCALC 68.4 08/26/2019    LDLCALC 99.4 06/07/2018    LDLCALC 85.8 04/03/2017     Lab Results   Component Value Date    TRIG 83 08/26/2019    TRIG 103 06/07/2018    TRIG 46 04/03/2017     Lab Results   Component Value Date    CHOLHDL 44.8 08/26/2019    CHOLHDL 38.8 06/07/2018    CHOLHDL 41.0 04/03/2017     Lab Results   Component Value Date    ALT 16 08/26/2019    AST 20 08/26/2019    ALKPHOS 70 08/26/2019    BILITOT 1.1 (H) 08/26/2019     Lab Visit on 08/03/2020   Component Date Value Ref Range Status    TSH 08/03/2020 3.457  0.400 - 4.000 uIU/mL Final    Sodium 08/03/2020 142  136 - 145 mmol/L Final    Potassium 08/03/2020 3.7  3.5 - 5.1 mmol/L Final    Chloride 08/03/2020 104  95 - 110 mmol/L Final    CO2 08/03/2020 28  23 - 29 mmol/L Final    Glucose 08/03/2020 100  70 - 110 mg/dL Final    BUN, Bld 08/03/2020 22  8 - 23 mg/dL Final    Creatinine 08/03/2020 0.9  0.5 - 1.4 mg/dL Final    Calcium 08/03/2020 10.1  8.7 - 10.5 mg/dL Final    Anion Gap 08/03/2020 10  8 - 16 mmol/L Final    eGFR if African American 08/03/2020 >60.0  >60 mL/min/1.73 m^2 Final    eGFR if non African American 08/03/2020 >60.0  >60 mL/min/1.73 m^2 Final    Comment: Calculation used to obtain the estimated glomerular filtration  rate (eGFR) is the CKD-EPI equation.       Magnesium 08/03/2020 2.2  1.6 - 2.6 mg/dL Final

## 2020-10-02 ENCOUNTER — OFFICE VISIT (OUTPATIENT)
Dept: FAMILY MEDICINE | Facility: CLINIC | Age: 65
End: 2020-10-02
Payer: COMMERCIAL

## 2020-10-02 VITALS
BODY MASS INDEX: 32.39 KG/M2 | SYSTOLIC BLOOD PRESSURE: 121 MMHG | TEMPERATURE: 99 F | WEIGHT: 176 LBS | RESPIRATION RATE: 18 BRPM | HEIGHT: 62 IN | DIASTOLIC BLOOD PRESSURE: 72 MMHG | HEART RATE: 71 BPM

## 2020-10-02 DIAGNOSIS — I10 ESSENTIAL HYPERTENSION: ICD-10-CM

## 2020-10-02 DIAGNOSIS — E05.00 TOXIC DIFFUSE GOITER: ICD-10-CM

## 2020-10-02 DIAGNOSIS — Z23 IMMUNIZATION DUE: ICD-10-CM

## 2020-10-02 DIAGNOSIS — E78.00 PURE HYPERCHOLESTEROLEMIA: ICD-10-CM

## 2020-10-02 DIAGNOSIS — Z11.4 ENCOUNTER FOR SCREENING FOR HIV: ICD-10-CM

## 2020-10-02 DIAGNOSIS — I47.10 PAROXYSMAL SUPRAVENTRICULAR TACHYCARDIA: ICD-10-CM

## 2020-10-02 DIAGNOSIS — Z12.31 ENCOUNTER FOR SCREENING MAMMOGRAM FOR BREAST CANCER: ICD-10-CM

## 2020-10-02 DIAGNOSIS — Z00.00 ANNUAL PHYSICAL EXAM: Primary | ICD-10-CM

## 2020-10-02 DIAGNOSIS — E78.5 HYPERLIPIDEMIA, UNSPECIFIED HYPERLIPIDEMIA TYPE: ICD-10-CM

## 2020-10-02 PROCEDURE — 99999 PR PBB SHADOW E&M-EST. PATIENT-LVL IV: ICD-10-PCS | Mod: PBBFAC,,, | Performed by: FAMILY MEDICINE

## 2020-10-02 PROCEDURE — 99999 PR PBB SHADOW E&M-EST. PATIENT-LVL IV: CPT | Mod: PBBFAC,,, | Performed by: FAMILY MEDICINE

## 2020-10-02 RX ORDER — FUROSEMIDE 40 MG/1
40 TABLET ORAL 2 TIMES DAILY
Qty: 180 TABLET | Refills: 3 | Status: SHIPPED | OUTPATIENT
Start: 2020-10-02 | End: 2021-10-27 | Stop reason: SDUPTHER

## 2020-10-02 RX ORDER — POTASSIUM CHLORIDE 20 MEQ/1
20 TABLET, EXTENDED RELEASE ORAL DAILY
Qty: 90 TABLET | Refills: 3 | Status: SHIPPED | OUTPATIENT
Start: 2020-10-02 | End: 2021-09-27

## 2020-10-02 RX ORDER — METOPROLOL SUCCINATE 50 MG/1
50 TABLET, EXTENDED RELEASE ORAL DAILY
Qty: 90 TABLET | Refills: 3 | Status: SHIPPED | OUTPATIENT
Start: 2020-10-02 | End: 2021-08-22

## 2020-10-02 RX ORDER — LEVOTHYROXINE SODIUM 100 UG/1
100 TABLET ORAL DAILY
Qty: 90 TABLET | Refills: 3 | Status: SHIPPED | OUTPATIENT
Start: 2020-10-02 | End: 2021-08-22

## 2020-10-02 RX ORDER — ROSUVASTATIN CALCIUM 10 MG/1
10 TABLET, COATED ORAL DAILY
Qty: 90 TABLET | Refills: 3 | Status: SHIPPED | OUTPATIENT
Start: 2020-10-02 | End: 2021-09-27

## 2020-10-02 NOTE — PROGRESS NOTES
Subjective:      Patient ID: Mallorychiara David is a 65 y.o. female.    Chief Complaint: Annual Exam    Problem List Items Addressed This Visit     Hyperlipidemia    Overview     The patient presents with hyperlipidemia.  The patient reports tolerating the medication well and is in excellent compliance.  There have been no medication side effects.  The patient denies chest pain, neuropathy, and myalgias.  The patient has reduced fat intake and has been exercising.  Current treatment has included the medications listed in the med card.    Lab Results   Component Value Date    CHOL 154 08/26/2019    CHOL 196 06/07/2018    CHOL 161 04/03/2017       Lab Results   Component Value Date    HDL 69 08/26/2019    HDL 76 (H) 06/07/2018    HDL 66 04/03/2017       Lab Results   Component Value Date    LDLCALC 68.4 08/26/2019    LDLCALC 99.4 06/07/2018    LDLCALC 85.8 04/03/2017       Lab Results   Component Value Date    TRIG 83 08/26/2019    TRIG 103 06/07/2018    TRIG 46 04/03/2017       Lab Results   Component Value Date    CHOLHDL 44.8 08/26/2019    CHOLHDL 38.8 06/07/2018    CHOLHDL 41.0 04/03/2017     Lab Results   Component Value Date    ALT 16 08/26/2019    AST 20 08/26/2019    ALKPHOS 70 08/26/2019    BILITOT 1.1 (H) 08/26/2019   '           Hypertension    Overview     The patient presents with essential hypertension.  The patient is tolerating the medication well and is in excellent compliance.  The patient is experiencing no side effects.  Counseling was offered regarding low salt diets.  The patient has a reduced salt intake.  The patient denies chest pain, palpitations, shortness of breath, dyspnea on exertion, left or murmur neck pain, nausea, vomiting, diaphoresis, paroxysmal nocturnal dyspnea, and orthopnea.   Hypertension Medications             furosemide (LASIX) 40 MG tablet Take 1 tablet (40 mg total) by mouth 2 (two) times daily.    metoprolol succinate (TOPROL-XL) 50 MG 24 hr tablet Take 1 tablet (50 mg total)  by mouth once daily.                 Paroxysmal supraventricular tachycardia    Toxic diffuse goiter    Overview     The patient presents with hypothyroidism.  The patient denies agitation, anxiety, blurred vision, chest pain, cold intolerance, constipation, dizziness, dry skin, fatigue, lightheadedness, paresthesias, skin coarsening, tachycardia, tremor, weight gain or weight loss.  The patient's current treatment has included Synthroid with a good response but it is low now and I will need to adjust the medicine..    Lab Results   Component Value Date    TSH 3.457 08/03/2020                Other Visit Diagnoses     Encounter for screening for HIV    -  Primary    Immunization due        Encounter for screening mammogram for breast cancer              Past Medical History:  Past Medical History:   Diagnosis Date    *Atrial flutter     s/p ablation    Abnormal Pap smear of cervix     ASD (atrial septal defect), ostium secundum     repaired    Benign hypertensive heart disease without heart failure     Endometriosis     Fatty liver     Hyperlipidemia     Hypertension     2009    Nodular goiter (nontoxic)     1999    Special screening for malignant neoplasms, colon 10/28/2014     Past Surgical History:   Procedure Laterality Date    ABLATION OF DYSRHYTHMIC FOCUS      ASD REPAIR, SECUNDUM      CARPAL TUNNEL RELEASE  2007    right hand    COLONOSCOPY N/A 1/14/2020    Procedure: COLONOSCOPY;  Surgeon: Latrell Banks MD;  Location: [facility];  Service: Endoscopy;  Laterality: N/A;    EYE SURGERY  11/2019 12/2019 right eye     Heart ablation  2011    OOPHORECTOMY      Open Heart Surgery  1970    OVARIAN CYST SURGERY  1981     Review of patient's allergies indicates:   Allergen Reactions    No known drug allergies      Current Outpatient Medications on File Prior to Visit   Medication Sig Dispense Refill    ANTIOX #11/OM3/DHA/EPA/LUT/NAMAN (50+ ADULT EYE HEALTH ORAL) Take by mouth.      aspirin 81  MG chewable tablet Take 81 mg by mouth once daily.        cetirizine (ZYRTEC) 10 mg Cap Zyrtec   1 po q day      fluticasone propionate (FLONASE) 50 mcg/actuation nasal spray 1 spray by Each Nostril route once daily.      furosemide (LASIX) 40 MG tablet Take 1 tablet (40 mg total) by mouth 2 (two) times daily. 60 tablet 11    levothyroxine (SYNTHROID) 100 MCG tablet Take 1 tablet (100 mcg total) by mouth once daily. 90 tablet 3    MAGNESIUM ORAL Take by mouth once daily.      metoprolol succinate (TOPROL-XL) 50 MG 24 hr tablet Take 1 tablet (50 mg total) by mouth once daily. 90 tablet 3    potassium chloride SA (K-DUR,KLOR-CON) 20 MEQ tablet Take 1 tablet (20 mEq total) by mouth once daily. 90 tablet 0    rosuvastatin (CRESTOR) 10 MG tablet Take 1 tablet (10 mg total) by mouth once daily. 90 tablet 0    [DISCONTINUED] DUREZOL 0.05 % Drop ophthalmic solution Instill 1 drop in operated eye twice a day Start after surgery      [DISCONTINUED] ILEVRO 0.3 % DrpS Instill 1 drop in operated eye once a day Start 3 days before surgery      [DISCONTINUED] ofloxacin (OCUFLOX) 0.3 % ophthalmic solution Instill 1 drop in operated eye 4 times a day Start 3 days before surgery       No current facility-administered medications on file prior to visit.      Social History     Socioeconomic History    Marital status:      Spouse name: Alexi    Number of children: 2    Years of education: Not on file    Highest education level: Not on file   Occupational History    Occupation:      Employer: [employer]   Social Needs    Financial resource strain: Not very hard    Food insecurity     Worry: Never true     Inability: Never true    Transportation needs     Medical: No     Non-medical: No   Tobacco Use    Smoking status: Never Smoker    Smokeless tobacco: Never Used   Substance and Sexual Activity    Alcohol use: Yes     Alcohol/week: 0.0 standard drinks     Frequency: 2-4 times a month      Drinks per session: 1 or 2     Binge frequency: Never     Comment: occasional glass of red wine    Drug use: No    Sexual activity: Not Currently     Partners: Male     Birth control/protection: Post-menopausal   Lifestyle    Physical activity     Days per week: 2 days     Minutes per session: 40 min    Stress: Only a little   Relationships    Social connections     Talks on phone: More than three times a week     Gets together: Once a week     Attends Mosque service: Not on file     Active member of club or organization: Yes     Attends meetings of clubs or organizations: More than 4 times per year     Relationship status:    Other Topics Concern    Are you pregnant or think you may be? Not Asked    Breast-feeding Not Asked   Social History Narrative    Not on file     Family History   Problem Relation Age of Onset    Hypertension Mother     Arthritis Mother     Hearing loss Mother         cholesteatoma surgery    Cancer Father         colon    Diabetes Father     Breast cancer Sister     Cancer Sister         Breast    Aneurysm Brother         this was of his heart.    Birth defects Brother         Heart murmer    Heart disease Brother     Hypertension Brother     Diabetes Brother     Diabetes Brother     Hypertension Brother     Diabetes Sister     Heart disease Sister     Hypertension Sister     Stroke Sister     Melanoma Neg Hx        Review of Systems   Constitutional: Negative for fatigue, fever and unexpected weight change.   HENT: Negative for congestion, ear pain, postnasal drip and sore throat.    Eyes: Negative for visual disturbance.   Respiratory: Negative for cough, chest tightness, shortness of breath and wheezing.    Cardiovascular: Negative for chest pain, palpitations and leg swelling.   Gastrointestinal: Negative for abdominal pain, blood in stool, constipation, diarrhea, nausea and vomiting.   Genitourinary: Negative for dysuria and hematuria.   Neurological:  "Negative for weakness and numbness.       Objective:     /72 (BP Location: Right arm, Patient Position: Sitting, BP Method: Medium (Automatic))   Pulse 71   Temp 98.8 °F (37.1 °C)   Resp 18   Ht 5' 2" (1.575 m)   Wt 79.8 kg (176 lb)   LMP 09/20/2011   BMI 32.19 kg/m²     Physical Exam  Constitutional:       Appearance: Normal appearance. She is well-developed.   HENT:      Head: Normocephalic and atraumatic.      Right Ear: Tympanic membrane, ear canal and external ear normal.      Left Ear: Tympanic membrane, ear canal and external ear normal.      Nose: Nose normal.      Mouth/Throat:      Mouth: No oral lesions.      Pharynx: Uvula midline. No oropharyngeal exudate or posterior oropharyngeal erythema.   Eyes:      General: Lids are normal. No scleral icterus.        Right eye: No discharge.         Left eye: No discharge.      Extraocular Movements:      Right eye: No nystagmus.      Left eye: No nystagmus.      Conjunctiva/sclera:      Right eye: Right conjunctiva is not injected. No hemorrhage.     Left eye: Left conjunctiva is not injected. No hemorrhage.     Pupils: Pupils are equal, round, and reactive to light.   Neck:      Musculoskeletal: Full passive range of motion without pain, normal range of motion and neck supple.      Thyroid: No thyroid mass or thyromegaly.      Vascular: No carotid bruit or JVD.      Trachea: No tracheal tenderness or tracheal deviation.   Cardiovascular:      Rate and Rhythm: Normal rate and regular rhythm.      Pulses:           Carotid pulses are 2+ on the right side and 2+ on the left side.       Radial pulses are 2+ on the right side and 2+ on the left side.        Posterior tibial pulses are 2+ on the right side and 2+ on the left side.      Heart sounds: S1 normal and S2 normal. No murmur.   Pulmonary:      Effort: Pulmonary effort is normal. No respiratory distress.      Breath sounds: Normal breath sounds. No wheezing, rhonchi or rales.   Abdominal:      " General: Bowel sounds are normal. There is no distension or abdominal bruit.      Palpations: Abdomen is soft. There is no mass or pulsatile mass.      Tenderness: There is no abdominal tenderness. There is no rebound.   Musculoskeletal:      Right knee: She exhibits no swelling. No tenderness found.      Left knee: She exhibits no swelling. No tenderness found.   Lymphadenopathy:      Head:      Right side of head: No submental or submandibular adenopathy.      Left side of head: No submental or submandibular adenopathy.      Cervical: No cervical adenopathy.   Skin:     General: Skin is warm and dry.      Findings: No rash.      Nails: There is no clubbing.     Neurological:      Mental Status: She is alert.      Cranial Nerves: No cranial nerve deficit.      Sensory: No sensory deficit.   Psychiatric:         Speech: Speech normal.         Behavior: Behavior normal. Behavior is cooperative.         Thought Content: Thought content normal.       Assessment:     1. Annual physical exam    2. Pure hypercholesterolemia    3. Toxic diffuse goiter    4. Essential hypertension    5. Paroxysmal supraventricular tachycardia    6. Hyperlipidemia, unspecified hyperlipidemia type    7. Encounter for screening for HIV    8. Immunization due    9. Encounter for screening mammogram for breast cancer        Plan:     Problem List Items Addressed This Visit     Hyperlipidemia    Relevant Medications    rosuvastatin (CRESTOR) 10 MG tablet    Other Relevant Orders    Lipid Panel    Hypertension    Relevant Medications    furosemide (LASIX) 40 MG tablet    levothyroxine (SYNTHROID) 100 MCG tablet    potassium chloride SA (K-DUR,KLOR-CON) 20 MEQ tablet    metoprolol succinate (TOPROL-XL) 50 MG 24 hr tablet    Other Relevant Orders    Comprehensive metabolic panel    Paroxysmal supraventricular tachycardia    Relevant Medications    metoprolol succinate (TOPROL-XL) 50 MG 24 hr tablet    Toxic diffuse goiter    Relevant Medications     levothyroxine (SYNTHROID) 100 MCG tablet    Other Relevant Orders    US Soft Tissue Head Neck Thyroid      Other Visit Diagnoses     Annual physical exam    -  Primary    Encounter for screening for HIV        Relevant Orders    HIV 1/2 Ag/Ab (4th Gen)    Immunization due        Relevant Orders    Zoster Recombinant Vaccine    Influenza - Quadrivalent (PF)    Encounter for screening mammogram for breast cancer        Relevant Orders    Mammo Digital Screening Bilat        No follow-ups on file.      I have discontinued Mallory David's DurezoL, ILEVRO, and ofloxacin. I am also having her maintain her aspirin, ANTIOX #11/OM3/DHA/EPA/LUT/NAMAN (50+ ADULT EYE HEALTH ORAL), cetirizine, MAGNESIUM ORAL, fluticasone propionate, furosemide, levothyroxine, potassium chloride SA, metoprolol succinate, and rosuvastatin.    Mallory was seen today for annual exam.    Diagnoses and all orders for this visit:    Annual physical exam    Pure hypercholesterolemia  -     Lipid Panel; Future    Toxic diffuse goiter  -     levothyroxine (SYNTHROID) 100 MCG tablet; Take 1 tablet (100 mcg total) by mouth once daily.  -     US Soft Tissue Head Neck Thyroid; Future    Essential hypertension  -     furosemide (LASIX) 40 MG tablet; Take 1 tablet (40 mg total) by mouth 2 (two) times daily.  -     levothyroxine (SYNTHROID) 100 MCG tablet; Take 1 tablet (100 mcg total) by mouth once daily.  -     potassium chloride SA (K-DUR,KLOR-CON) 20 MEQ tablet; Take 1 tablet (20 mEq total) by mouth once daily.  -     metoprolol succinate (TOPROL-XL) 50 MG 24 hr tablet; Take 1 tablet (50 mg total) by mouth once daily.  -     Comprehensive metabolic panel; Future    Paroxysmal supraventricular tachycardia  -     metoprolol succinate (TOPROL-XL) 50 MG 24 hr tablet; Take 1 tablet (50 mg total) by mouth once daily.    Hyperlipidemia, unspecified hyperlipidemia type  -     rosuvastatin (CRESTOR) 10 MG tablet; Take 1 tablet (10 mg total) by mouth once  daily.    Encounter for screening for HIV  -     HIV 1/2 Ag/Ab (4th Gen); Future    Immunization due  -     Zoster Recombinant Vaccine  -     Influenza - Quadrivalent (PF); Future    Encounter for screening mammogram for breast cancer  -     Mammo Digital Screening Bilat; Future         The patient was instructed to stop the following meds:  Medications Discontinued During This Encounter   Medication Reason    DUREZOL 0.05 % Drop ophthalmic solution     ILEVRO 0.3 % DrpS     ofloxacin (OCUFLOX) 0.3 % ophthalmic solution      Orders Placed This Encounter   Procedures    Mammo Digital Screening Bilat     Standing Status:   Future     Standing Expiration Date:   10/2/2021     Order Specific Question:   Reason for Exam:     Answer:   screening    US Soft Tissue Head Neck Thyroid     Standing Status:   Future     Standing Expiration Date:   10/3/2021    Zoster Recombinant Vaccine    Influenza - Quadrivalent (PF)     Standing Status:   Future     Standing Expiration Date:   10/2/2021    HIV 1/2 Ag/Ab (4th Gen)     Standing Status:   Future     Standing Expiration Date:   12/2/2021    Comprehensive metabolic panel     Standing Status:   Future     Standing Expiration Date:   10/2/2021    Lipid Panel     Standing Status:   Future     Standing Expiration Date:   10/2/2021

## 2020-10-16 ENCOUNTER — TELEPHONE (OUTPATIENT)
Dept: ADMINISTRATIVE | Facility: HOSPITAL | Age: 65
End: 2020-10-16

## 2020-10-16 ENCOUNTER — TELEPHONE (OUTPATIENT)
Dept: RADIOLOGY | Facility: HOSPITAL | Age: 65
End: 2020-10-16

## 2020-10-19 ENCOUNTER — HOSPITAL ENCOUNTER (OUTPATIENT)
Dept: RADIOLOGY | Facility: HOSPITAL | Age: 65
Discharge: HOME | End: 2020-10-19
Attending: FAMILY MEDICINE
Payer: COMMERCIAL

## 2020-10-19 VITALS — BODY MASS INDEX: 32.37 KG/M2 | HEIGHT: 62 IN | WEIGHT: 175.94 LBS

## 2020-10-19 DIAGNOSIS — E05.00 TOXIC DIFFUSE GOITER: ICD-10-CM

## 2020-10-19 DIAGNOSIS — Z12.31 ENCOUNTER FOR SCREENING MAMMOGRAM FOR BREAST CANCER: ICD-10-CM

## 2020-10-19 PROCEDURE — 77067 SCR MAMMO BI INCL CAD: CPT | Mod: TC,PO

## 2020-10-19 PROCEDURE — 76536 US EXAM OF HEAD AND NECK: CPT | Mod: 26,,, | Performed by: RADIOLOGY

## 2020-10-19 PROCEDURE — 77063 MAMMO DIGITAL SCREENING BILAT WITH TOMO: ICD-10-PCS | Mod: 26,,, | Performed by: RADIOLOGY

## 2020-10-19 PROCEDURE — 76536 US SOFT TISSUE HEAD NECK THYROID: ICD-10-PCS | Mod: 26,,, | Performed by: RADIOLOGY

## 2020-10-19 PROCEDURE — 77067 MAMMO DIGITAL SCREENING BILAT WITH TOMO: ICD-10-PCS | Mod: 26,,, | Performed by: RADIOLOGY

## 2020-10-19 PROCEDURE — 76536 US EXAM OF HEAD AND NECK: CPT | Mod: TC,PO

## 2020-10-19 PROCEDURE — 77067 SCR MAMMO BI INCL CAD: CPT | Mod: 26,,, | Performed by: RADIOLOGY

## 2020-10-19 PROCEDURE — 77063 BREAST TOMOSYNTHESIS BI: CPT | Mod: 26,,, | Performed by: RADIOLOGY

## 2020-10-19 NOTE — PROGRESS NOTES
I am happy to say that there is no significant change from the previous ultrasound.  This is good news.

## 2020-11-30 ENCOUNTER — PATIENT MESSAGE (OUTPATIENT)
Dept: FAMILY MEDICINE | Facility: CLINIC | Age: 65
End: 2020-11-30

## 2020-12-29 ENCOUNTER — TELEPHONE (OUTPATIENT)
Dept: FAMILY MEDICINE | Facility: CLINIC | Age: 65
End: 2020-12-29

## 2020-12-29 ENCOUNTER — PATIENT MESSAGE (OUTPATIENT)
Dept: FAMILY MEDICINE | Facility: CLINIC | Age: 65
End: 2020-12-29

## 2020-12-29 DIAGNOSIS — Z23 IMMUNIZATION DUE: Primary | ICD-10-CM

## 2020-12-29 NOTE — TELEPHONE ENCOUNTER
I have signed for the following orders AND/OR meds.  Please call the patient and ask the patient to schedule the testing AND/OR inform about any medications that were sent.      Orders Placed This Encounter   Procedures    (In Office Administered) Zoster Recombinant Vaccine

## 2020-12-30 ENCOUNTER — CLINICAL SUPPORT (OUTPATIENT)
Dept: FAMILY MEDICINE | Facility: CLINIC | Age: 65
End: 2020-12-30
Payer: COMMERCIAL

## 2020-12-30 DIAGNOSIS — Z23 IMMUNIZATION DUE: Primary | ICD-10-CM

## 2020-12-30 PROCEDURE — 90750 ZOSTER RECOMBINANT VACCINE: ICD-10-PCS | Mod: S$GLB,,, | Performed by: FAMILY MEDICINE

## 2020-12-30 PROCEDURE — 90471 ZOSTER RECOMBINANT VACCINE: ICD-10-PCS | Mod: S$GLB,,, | Performed by: FAMILY MEDICINE

## 2020-12-30 PROCEDURE — 90471 IMMUNIZATION ADMIN: CPT | Mod: S$GLB,,, | Performed by: FAMILY MEDICINE

## 2020-12-30 PROCEDURE — 99999 PR PBB SHADOW E&M-EST. PATIENT-LVL II: ICD-10-PCS | Mod: PBBFAC,,,

## 2020-12-30 PROCEDURE — 90750 HZV VACC RECOMBINANT IM: CPT | Mod: S$GLB,,, | Performed by: FAMILY MEDICINE

## 2020-12-30 PROCEDURE — 99999 PR PBB SHADOW E&M-EST. PATIENT-LVL II: CPT | Mod: PBBFAC,,,

## 2021-03-06 ENCOUNTER — PATIENT MESSAGE (OUTPATIENT)
Dept: FAMILY MEDICINE | Facility: CLINIC | Age: 66
End: 2021-03-06

## 2021-03-09 ENCOUNTER — OFFICE VISIT (OUTPATIENT)
Dept: FAMILY MEDICINE | Facility: CLINIC | Age: 66
End: 2021-03-09
Payer: COMMERCIAL

## 2021-03-09 VITALS
TEMPERATURE: 100 F | HEIGHT: 62 IN | HEART RATE: 72 BPM | DIASTOLIC BLOOD PRESSURE: 72 MMHG | WEIGHT: 164.38 LBS | BODY MASS INDEX: 30.25 KG/M2 | SYSTOLIC BLOOD PRESSURE: 125 MMHG

## 2021-03-09 DIAGNOSIS — K12.1 STOMATITIS: Primary | ICD-10-CM

## 2021-03-09 PROCEDURE — 3078F DIAST BP <80 MM HG: CPT | Mod: CPTII,S$GLB,, | Performed by: FAMILY MEDICINE

## 2021-03-09 PROCEDURE — 3074F SYST BP LT 130 MM HG: CPT | Mod: CPTII,S$GLB,, | Performed by: FAMILY MEDICINE

## 2021-03-09 PROCEDURE — 99999 PR PBB SHADOW E&M-EST. PATIENT-LVL III: CPT | Mod: PBBFAC,,, | Performed by: FAMILY MEDICINE

## 2021-03-09 PROCEDURE — 99213 PR OFFICE/OUTPT VISIT, EST, LEVL III, 20-29 MIN: ICD-10-PCS | Mod: S$GLB,,, | Performed by: FAMILY MEDICINE

## 2021-03-09 PROCEDURE — 99999 PR PBB SHADOW E&M-EST. PATIENT-LVL III: ICD-10-PCS | Mod: PBBFAC,,, | Performed by: FAMILY MEDICINE

## 2021-03-09 PROCEDURE — 3008F PR BODY MASS INDEX (BMI) DOCUMENTED: ICD-10-PCS | Mod: CPTII,S$GLB,, | Performed by: FAMILY MEDICINE

## 2021-03-09 PROCEDURE — 1126F AMNT PAIN NOTED NONE PRSNT: CPT | Mod: S$GLB,,, | Performed by: FAMILY MEDICINE

## 2021-03-09 PROCEDURE — 3074F PR MOST RECENT SYSTOLIC BLOOD PRESSURE < 130 MM HG: ICD-10-PCS | Mod: CPTII,S$GLB,, | Performed by: FAMILY MEDICINE

## 2021-03-09 PROCEDURE — 99213 OFFICE O/P EST LOW 20 MIN: CPT | Mod: S$GLB,,, | Performed by: FAMILY MEDICINE

## 2021-03-09 PROCEDURE — 3008F BODY MASS INDEX DOCD: CPT | Mod: CPTII,S$GLB,, | Performed by: FAMILY MEDICINE

## 2021-03-09 PROCEDURE — 1126F PR PAIN SEVERITY QUANTIFIED, NO PAIN PRESENT: ICD-10-PCS | Mod: S$GLB,,, | Performed by: FAMILY MEDICINE

## 2021-03-09 PROCEDURE — 3078F PR MOST RECENT DIASTOLIC BLOOD PRESSURE < 80 MM HG: ICD-10-PCS | Mod: CPTII,S$GLB,, | Performed by: FAMILY MEDICINE

## 2021-03-09 RX ORDER — CEPHALEXIN 500 MG/1
500 CAPSULE ORAL EVERY 12 HOURS
Qty: 14 CAPSULE | Refills: 0 | Status: SHIPPED | OUTPATIENT
Start: 2021-03-09 | End: 2021-05-10

## 2021-03-09 RX ORDER — NYSTATIN 100000 [USP'U]/ML
4 SUSPENSION ORAL 4 TIMES DAILY
Qty: 160 ML | Refills: 0 | Status: SHIPPED | OUTPATIENT
Start: 2021-03-09 | End: 2021-03-19

## 2021-05-10 ENCOUNTER — OFFICE VISIT (OUTPATIENT)
Dept: FAMILY MEDICINE | Facility: CLINIC | Age: 66
End: 2021-05-10
Payer: COMMERCIAL

## 2021-05-10 VITALS
HEIGHT: 62 IN | TEMPERATURE: 99 F | DIASTOLIC BLOOD PRESSURE: 72 MMHG | HEART RATE: 79 BPM | WEIGHT: 167 LBS | SYSTOLIC BLOOD PRESSURE: 123 MMHG | BODY MASS INDEX: 30.73 KG/M2

## 2021-05-10 DIAGNOSIS — J40 BRONCHITIS: Primary | ICD-10-CM

## 2021-05-10 PROCEDURE — 99999 PR PBB SHADOW E&M-EST. PATIENT-LVL III: ICD-10-PCS | Mod: PBBFAC,,, | Performed by: FAMILY MEDICINE

## 2021-05-10 PROCEDURE — 99213 OFFICE O/P EST LOW 20 MIN: CPT | Mod: S$GLB,,, | Performed by: FAMILY MEDICINE

## 2021-05-10 PROCEDURE — 99213 PR OFFICE/OUTPT VISIT, EST, LEVL III, 20-29 MIN: ICD-10-PCS | Mod: S$GLB,,, | Performed by: FAMILY MEDICINE

## 2021-05-10 PROCEDURE — 99999 PR PBB SHADOW E&M-EST. PATIENT-LVL III: CPT | Mod: PBBFAC,,, | Performed by: FAMILY MEDICINE

## 2021-05-10 RX ORDER — GUAIFENESIN 600 MG/1
1200 TABLET, EXTENDED RELEASE ORAL 2 TIMES DAILY
COMMUNITY
End: 2021-10-15

## 2021-05-10 RX ORDER — MONTELUKAST SODIUM 10 MG/1
10 TABLET ORAL NIGHTLY
Qty: 30 TABLET | Refills: 0 | Status: SHIPPED | OUTPATIENT
Start: 2021-05-10 | End: 2021-06-09

## 2021-05-10 RX ORDER — AZITHROMYCIN 250 MG/1
TABLET, FILM COATED ORAL
Qty: 6 TABLET | Refills: 0 | Status: SHIPPED | OUTPATIENT
Start: 2021-05-10 | End: 2021-06-09

## 2021-06-09 ENCOUNTER — OFFICE VISIT (OUTPATIENT)
Dept: DERMATOLOGY | Facility: CLINIC | Age: 66
End: 2021-06-09
Payer: COMMERCIAL

## 2021-06-09 DIAGNOSIS — D48.5 NEOPLASM OF UNCERTAIN BEHAVIOR OF SKIN: Primary | ICD-10-CM

## 2021-06-09 DIAGNOSIS — Z12.83 SKIN CANCER SCREENING: ICD-10-CM

## 2021-06-09 DIAGNOSIS — L82.1 SEBORRHEIC KERATOSES: ICD-10-CM

## 2021-06-09 DIAGNOSIS — D22.9 MULTIPLE BENIGN NEVI: ICD-10-CM

## 2021-06-09 PROCEDURE — 88305 TISSUE EXAM BY PATHOLOGIST: ICD-10-PCS | Mod: 26,,, | Performed by: PATHOLOGY

## 2021-06-09 PROCEDURE — 99999 PR PBB SHADOW E&M-EST. PATIENT-LVL III: ICD-10-PCS | Mod: PBBFAC,,, | Performed by: DERMATOLOGY

## 2021-06-09 PROCEDURE — 11102 PR TANGENTIAL BIOPSY, SKIN, SINGLE LESION: ICD-10-PCS | Mod: S$GLB,,, | Performed by: DERMATOLOGY

## 2021-06-09 PROCEDURE — 11102 TANGNTL BX SKIN SINGLE LES: CPT | Mod: S$GLB,,, | Performed by: DERMATOLOGY

## 2021-06-09 PROCEDURE — 99999 PR PBB SHADOW E&M-EST. PATIENT-LVL III: CPT | Mod: PBBFAC,,, | Performed by: DERMATOLOGY

## 2021-06-09 PROCEDURE — 99213 OFFICE O/P EST LOW 20 MIN: CPT | Mod: 25,S$GLB,, | Performed by: DERMATOLOGY

## 2021-06-09 PROCEDURE — 88305 TISSUE EXAM BY PATHOLOGIST: CPT | Performed by: PATHOLOGY

## 2021-06-09 PROCEDURE — 88305 TISSUE EXAM BY PATHOLOGIST: CPT | Mod: 26,,, | Performed by: PATHOLOGY

## 2021-06-09 PROCEDURE — 99213 PR OFFICE/OUTPT VISIT, EST, LEVL III, 20-29 MIN: ICD-10-PCS | Mod: 25,S$GLB,, | Performed by: DERMATOLOGY

## 2021-06-11 LAB
FINAL PATHOLOGIC DIAGNOSIS: NORMAL
GROSS: NORMAL
Lab: NORMAL
MICROSCOPIC EXAM: NORMAL

## 2021-06-16 ENCOUNTER — TELEPHONE (OUTPATIENT)
Dept: DERMATOLOGY | Facility: CLINIC | Age: 66
End: 2021-06-16

## 2021-07-15 ENCOUNTER — OFFICE VISIT (OUTPATIENT)
Dept: DERMATOLOGY | Facility: CLINIC | Age: 66
End: 2021-07-15
Payer: COMMERCIAL

## 2021-07-15 VITALS — WEIGHT: 167.13 LBS | BODY MASS INDEX: 30.76 KG/M2 | RESPIRATION RATE: 18 BRPM | HEIGHT: 62 IN

## 2021-07-15 DIAGNOSIS — D48.5 NEOPLASM OF UNCERTAIN BEHAVIOR OF SKIN: Primary | ICD-10-CM

## 2021-07-15 PROCEDURE — 99999 PR PBB SHADOW E&M-EST. PATIENT-LVL III: CPT | Mod: PBBFAC,,, | Performed by: DERMATOLOGY

## 2021-07-15 PROCEDURE — 88305 TISSUE EXAM BY PATHOLOGIST: ICD-10-PCS | Mod: 26,,, | Performed by: PATHOLOGY

## 2021-07-15 PROCEDURE — 99999 PR PBB SHADOW E&M-EST. PATIENT-LVL III: ICD-10-PCS | Mod: PBBFAC,,, | Performed by: DERMATOLOGY

## 2021-07-15 PROCEDURE — 88305 TISSUE EXAM BY PATHOLOGIST: CPT | Performed by: PATHOLOGY

## 2021-07-15 PROCEDURE — 11104 PR PUNCH BIOPSY, SKIN, SINGLE LESION: ICD-10-PCS | Mod: S$GLB,,, | Performed by: DERMATOLOGY

## 2021-07-15 PROCEDURE — 11104 PUNCH BX SKIN SINGLE LESION: CPT | Mod: S$GLB,,, | Performed by: DERMATOLOGY

## 2021-07-15 PROCEDURE — 99499 UNLISTED E&M SERVICE: CPT | Mod: S$GLB,,, | Performed by: DERMATOLOGY

## 2021-07-15 PROCEDURE — 88305 TISSUE EXAM BY PATHOLOGIST: CPT | Mod: 26,,, | Performed by: PATHOLOGY

## 2021-07-15 PROCEDURE — 99499 NO LOS: ICD-10-PCS | Mod: S$GLB,,, | Performed by: DERMATOLOGY

## 2021-07-19 NOTE — PROGRESS NOTES
Subjective:   Patient ID:  Mallorychiara David is a 65 y.o. female who presents for follow-up of No chief complaint on file.  Patient denies CP, angina or anginal equivalent.    Hypertension  This is a chronic problem. The current episode started more than 1 year ago. The problem has been gradually improving since onset. The problem is controlled. Pertinent negatives include no chest pain, palpitations or shortness of breath. Past treatments include beta blockers and diuretics. The current treatment provides moderate improvement. There are no compliance problems.    Hyperlipidemia  This is a chronic problem. The current episode started more than 1 year ago. The problem is controlled. Pertinent negatives include no chest pain or shortness of breath. Current antihyperlipidemic treatment includes statins. The current treatment provides moderate improvement of lipids. There are no compliance problems.    Edema  This is a chronic problem. The current episode started more than 1 year ago. The problem occurs rarely. The problem has been gradually improving. Pertinent negatives include no chest pain. Nothing aggravates the symptoms. She has tried rest for the symptoms.       Review of Systems   Constitution: Negative. Negative for weight gain.   HENT: Negative.    Eyes: Negative.    Cardiovascular: Negative.  Negative for chest pain, leg swelling and palpitations.   Respiratory: Negative.  Negative for shortness of breath.    Endocrine: Negative.    Hematologic/Lymphatic: Negative.    Skin: Negative.    Musculoskeletal: Negative for muscle weakness.   Gastrointestinal: Negative.    Genitourinary: Negative.    Neurological: Negative.  Negative for dizziness.   Psychiatric/Behavioral: Negative.    Allergic/Immunologic: Negative.      Family History   Problem Relation Age of Onset    Hypertension Mother     Cancer Father     Diabetes Father     Breast cancer Sister     Aneurysm Brother         this was of his heart.     Melanoma Neg Hx      Past Medical History:   Diagnosis Date    *Atrial flutter     s/p ablation    Abnormal Pap smear of cervix     ASD (atrial septal defect), ostium secundum     repaired    Benign hypertensive heart disease without heart failure     Endometriosis     Fatty liver     Hyperlipidemia     Hypertension     2009    Nodular goiter (nontoxic)     1999    Special screening for malignant neoplasms, colon 10/28/2014     Social History     Socioeconomic History    Marital status:      Spouse name: Alexi    Number of children: 2    Years of education: Not on file    Highest education level: Not on file   Occupational History    Occupation:      Employer: [employer]   Social Needs    Financial resource strain: Not very hard    Food insecurity     Worry: Never true     Inability: Never true    Transportation needs     Medical: No     Non-medical: No   Tobacco Use    Smoking status: Never Smoker    Smokeless tobacco: Never Used   Substance and Sexual Activity    Alcohol use: No     Frequency: 2-4 times a month     Drinks per session: 1 or 2     Binge frequency: Never    Drug use: No    Sexual activity: Not on file   Lifestyle    Physical activity     Days per week: 2 days     Minutes per session: 40 min    Stress: Only a little   Relationships    Social connections     Talks on phone: More than three times a week     Gets together: Once a week     Attends [employer] service: Not on file     Active member of club or organization: Yes     Attends meetings of clubs or organizations: More than 4 times per year     Relationship status:    Other Topics Concern    Are you pregnant or think you may be? Not Asked    Breast-feeding Not Asked   Social History Narrative    Not on file     Current Outpatient Medications on File Prior to Visit   Medication Sig Dispense Refill    ANTIOX #11/OM3/DHA/EPA/LUT/NAMAN (50+ ADULT EYE HEALTH ORAL) Take by mouth.      aspirin  81 MG chewable tablet Take 81 mg by mouth once daily.        cetirizine (ZYRTEC) 10 mg Cap Zyrtec   1 po q day      furosemide (LASIX) 40 MG tablet Take 1 tablet (40 mg total) by mouth 2 (two) times daily. 60 tablet 11    levothyroxine (SYNTHROID) 100 MCG tablet Take 1 tablet (100 mcg total) by mouth once daily. 90 tablet 3    MAGNESIUM ORAL Take by mouth once daily.      metoprolol succinate (TOPROL-XL) 50 MG 24 hr tablet Take 1 tablet (50 mg total) by mouth once daily. 90 tablet 3    potassium chloride SA (K-DUR,KLOR-CON) 20 MEQ tablet Take 1 tablet (20 mEq total) by mouth once daily. 90 tablet 3    rosuvastatin (CRESTOR) 10 MG tablet Take 1 tablet (10 mg total) by mouth once daily. 90 tablet 3    DUREZOL 0.05 % Drop ophthalmic solution Instill 1 drop in operated eye twice a day Start after surgery      fluticasone propionate (FLONASE) 50 mcg/actuation nasal spray 1 spray by Each Nostril route once daily.      ILEVRO 0.3 % DrpS Instill 1 drop in operated eye once a day Start 3 days before surgery      ofloxacin (OCUFLOX) 0.3 % ophthalmic solution Instill 1 drop in operated eye 4 times a day Start 3 days before surgery       No current facility-administered medications on file prior to visit.      Review of patient's allergies indicates:   Allergen Reactions    No known drug allergies        Objective:     Physical Exam   Constitutional: She is oriented to person, place, and time. She appears well-developed and well-nourished.   HENT:   Head: Normocephalic and atraumatic.   Eyes: Pupils are equal, round, and reactive to light. Conjunctivae and EOM are normal.   Neck: Normal range of motion. Neck supple.   Cardiovascular: Normal rate, regular rhythm, normal heart sounds and intact distal pulses.   Pulmonary/Chest: Effort normal and breath sounds normal.   Abdominal: Soft. Bowel sounds are normal.   Musculoskeletal: Normal range of motion.   Neurological: She is alert and oriented to person, place, and  time.   Skin: Skin is warm and dry.   Psychiatric: She has a normal mood and affect.   Nursing note and vitals reviewed.      Assessment:   1. HTN  2. HLP  3. Hx of SVT      Plan:     Continue metoprolol, lasix-htn/svt  Continue statin-hlp   normal...

## 2021-07-21 LAB
FINAL PATHOLOGIC DIAGNOSIS: NORMAL
GROSS: NORMAL
Lab: NORMAL
MICROSCOPIC EXAM: NORMAL

## 2021-07-23 ENCOUNTER — PATIENT MESSAGE (OUTPATIENT)
Dept: DERMATOLOGY | Facility: CLINIC | Age: 66
End: 2021-07-23

## 2021-07-23 DIAGNOSIS — L23.1 ALLERGIC CONTACT DERMATITIS DUE TO ADHESIVES: Primary | ICD-10-CM

## 2021-07-23 RX ORDER — MOMETASONE FUROATE 1 MG/G
OINTMENT TOPICAL
Qty: 45 G | Refills: 0 | Status: SHIPPED | OUTPATIENT
Start: 2021-07-23 | End: 2022-05-17 | Stop reason: SDUPTHER

## 2021-07-29 ENCOUNTER — CLINICAL SUPPORT (OUTPATIENT)
Dept: DERMATOLOGY | Facility: CLINIC | Age: 66
End: 2021-07-29
Payer: COMMERCIAL

## 2021-07-29 PROCEDURE — 99024 POSTOP FOLLOW-UP VISIT: CPT | Mod: S$GLB,,, | Performed by: DERMATOLOGY

## 2021-07-29 PROCEDURE — 99024 PR POST-OP FOLLOW-UP VISIT: ICD-10-PCS | Mod: S$GLB,,, | Performed by: DERMATOLOGY

## 2021-09-21 ENCOUNTER — OFFICE VISIT (OUTPATIENT)
Dept: CARDIOLOGY | Facility: CLINIC | Age: 66
End: 2021-09-21
Payer: COMMERCIAL

## 2021-09-21 VITALS
HEART RATE: 69 BPM | HEIGHT: 62 IN | OXYGEN SATURATION: 97 % | SYSTOLIC BLOOD PRESSURE: 132 MMHG | WEIGHT: 174.63 LBS | BODY MASS INDEX: 32.14 KG/M2 | DIASTOLIC BLOOD PRESSURE: 76 MMHG

## 2021-09-21 DIAGNOSIS — I10 ESSENTIAL HYPERTENSION: ICD-10-CM

## 2021-09-21 DIAGNOSIS — E78.00 PURE HYPERCHOLESTEROLEMIA: ICD-10-CM

## 2021-09-21 DIAGNOSIS — Q21.11 OSTIUM SECUNDUM TYPE ATRIAL SEPTAL DEFECT: Primary | ICD-10-CM

## 2021-09-21 DIAGNOSIS — I47.10 PAROXYSMAL SUPRAVENTRICULAR TACHYCARDIA: ICD-10-CM

## 2021-09-21 PROCEDURE — 99214 OFFICE O/P EST MOD 30 MIN: CPT | Mod: S$GLB,,, | Performed by: INTERNAL MEDICINE

## 2021-09-21 PROCEDURE — 99999 PR PBB SHADOW E&M-EST. PATIENT-LVL IV: CPT | Mod: PBBFAC,,, | Performed by: INTERNAL MEDICINE

## 2021-09-21 PROCEDURE — 99999 PR PBB SHADOW E&M-EST. PATIENT-LVL IV: ICD-10-PCS | Mod: PBBFAC,,, | Performed by: INTERNAL MEDICINE

## 2021-09-21 PROCEDURE — 99214 PR OFFICE/OUTPT VISIT, EST, LEVL IV, 30-39 MIN: ICD-10-PCS | Mod: S$GLB,,, | Performed by: INTERNAL MEDICINE

## 2021-10-11 ENCOUNTER — TELEPHONE (OUTPATIENT)
Dept: FAMILY MEDICINE | Facility: CLINIC | Age: 66
End: 2021-10-11

## 2021-10-11 DIAGNOSIS — Z12.31 VISIT FOR SCREENING MAMMOGRAM: Primary | ICD-10-CM

## 2021-10-15 ENCOUNTER — OFFICE VISIT (OUTPATIENT)
Dept: FAMILY MEDICINE | Facility: CLINIC | Age: 66
End: 2021-10-15
Payer: COMMERCIAL

## 2021-10-15 VITALS
WEIGHT: 174 LBS | BODY MASS INDEX: 32.02 KG/M2 | DIASTOLIC BLOOD PRESSURE: 77 MMHG | TEMPERATURE: 98 F | HEIGHT: 62 IN | SYSTOLIC BLOOD PRESSURE: 124 MMHG

## 2021-10-15 DIAGNOSIS — N39.0 URINARY TRACT INFECTION WITHOUT HEMATURIA, SITE UNSPECIFIED: Primary | ICD-10-CM

## 2021-10-15 DIAGNOSIS — R30.0 DYSURIA: ICD-10-CM

## 2021-10-15 LAB
BACTERIA #/AREA URNS HPF: ABNORMAL /HPF
BILIRUB UR QL STRIP: NEGATIVE
CLARITY UR: CLEAR
COLOR UR: YELLOW
GLUCOSE UR QL STRIP: NEGATIVE
HGB UR QL STRIP: NEGATIVE
KETONES UR QL STRIP: NEGATIVE
LEUKOCYTE ESTERASE UR QL STRIP: ABNORMAL
MICROSCOPIC COMMENT: ABNORMAL
NITRITE UR QL STRIP: NEGATIVE
PH UR STRIP: 6 [PH] (ref 5–8)
PROT UR QL STRIP: NEGATIVE
RBC #/AREA URNS HPF: 0 /HPF (ref 0–4)
SP GR UR STRIP: 1 (ref 1–1.03)
SQUAMOUS #/AREA URNS HPF: 4 /HPF
URN SPEC COLLECT METH UR: ABNORMAL
WBC #/AREA URNS HPF: 6 /HPF (ref 0–5)

## 2021-10-15 PROCEDURE — 99213 OFFICE O/P EST LOW 20 MIN: CPT | Mod: S$GLB,,, | Performed by: NURSE PRACTITIONER

## 2021-10-15 PROCEDURE — 3074F SYST BP LT 130 MM HG: CPT | Mod: CPTII,S$GLB,, | Performed by: NURSE PRACTITIONER

## 2021-10-15 PROCEDURE — 3008F BODY MASS INDEX DOCD: CPT | Mod: CPTII,S$GLB,, | Performed by: NURSE PRACTITIONER

## 2021-10-15 PROCEDURE — 3288F PR FALLS RISK ASSESSMENT DOCUMENTED: ICD-10-PCS | Mod: CPTII,S$GLB,, | Performed by: NURSE PRACTITIONER

## 2021-10-15 PROCEDURE — 1160F PR REVIEW ALL MEDS BY PRESCRIBER/CLIN PHARMACIST DOCUMENTED: ICD-10-PCS | Mod: CPTII,S$GLB,, | Performed by: NURSE PRACTITIONER

## 2021-10-15 PROCEDURE — 99999 PR PBB SHADOW E&M-EST. PATIENT-LVL III: CPT | Mod: PBBFAC,,, | Performed by: NURSE PRACTITIONER

## 2021-10-15 PROCEDURE — 3008F PR BODY MASS INDEX (BMI) DOCUMENTED: ICD-10-PCS | Mod: CPTII,S$GLB,, | Performed by: NURSE PRACTITIONER

## 2021-10-15 PROCEDURE — 1101F PT FALLS ASSESS-DOCD LE1/YR: CPT | Mod: CPTII,S$GLB,, | Performed by: NURSE PRACTITIONER

## 2021-10-15 PROCEDURE — 1159F PR MEDICATION LIST DOCUMENTED IN MEDICAL RECORD: ICD-10-PCS | Mod: CPTII,S$GLB,, | Performed by: NURSE PRACTITIONER

## 2021-10-15 PROCEDURE — 81000 URINALYSIS NONAUTO W/SCOPE: CPT | Mod: PO | Performed by: NURSE PRACTITIONER

## 2021-10-15 PROCEDURE — 99999 PR PBB SHADOW E&M-EST. PATIENT-LVL III: ICD-10-PCS | Mod: PBBFAC,,, | Performed by: NURSE PRACTITIONER

## 2021-10-15 PROCEDURE — 1101F PR PT FALLS ASSESS DOC 0-1 FALLS W/OUT INJ PAST YR: ICD-10-PCS | Mod: CPTII,S$GLB,, | Performed by: NURSE PRACTITIONER

## 2021-10-15 PROCEDURE — 3288F FALL RISK ASSESSMENT DOCD: CPT | Mod: CPTII,S$GLB,, | Performed by: NURSE PRACTITIONER

## 2021-10-15 PROCEDURE — 1160F RVW MEDS BY RX/DR IN RCRD: CPT | Mod: CPTII,S$GLB,, | Performed by: NURSE PRACTITIONER

## 2021-10-15 PROCEDURE — 99213 PR OFFICE/OUTPT VISIT, EST, LEVL III, 20-29 MIN: ICD-10-PCS | Mod: S$GLB,,, | Performed by: NURSE PRACTITIONER

## 2021-10-15 PROCEDURE — 3078F PR MOST RECENT DIASTOLIC BLOOD PRESSURE < 80 MM HG: ICD-10-PCS | Mod: CPTII,S$GLB,, | Performed by: NURSE PRACTITIONER

## 2021-10-15 PROCEDURE — 3078F DIAST BP <80 MM HG: CPT | Mod: CPTII,S$GLB,, | Performed by: NURSE PRACTITIONER

## 2021-10-15 PROCEDURE — 3074F PR MOST RECENT SYSTOLIC BLOOD PRESSURE < 130 MM HG: ICD-10-PCS | Mod: CPTII,S$GLB,, | Performed by: NURSE PRACTITIONER

## 2021-10-15 PROCEDURE — 1159F MED LIST DOCD IN RCRD: CPT | Mod: CPTII,S$GLB,, | Performed by: NURSE PRACTITIONER

## 2021-10-15 RX ORDER — CEPHALEXIN 500 MG/1
500 CAPSULE ORAL 3 TIMES DAILY
Qty: 21 CAPSULE | Refills: 0 | Status: SHIPPED | OUTPATIENT
Start: 2021-10-15 | End: 2021-10-22

## 2021-10-27 ENCOUNTER — HOSPITAL ENCOUNTER (OUTPATIENT)
Dept: RADIOLOGY | Facility: HOSPITAL | Age: 66
Discharge: HOME | End: 2021-10-27
Attending: FAMILY MEDICINE
Payer: COMMERCIAL

## 2021-10-27 ENCOUNTER — OFFICE VISIT (OUTPATIENT)
Dept: FAMILY MEDICINE | Facility: CLINIC | Age: 66
End: 2021-10-27
Payer: COMMERCIAL

## 2021-10-27 VITALS — HEIGHT: 62 IN | BODY MASS INDEX: 32.02 KG/M2 | WEIGHT: 174 LBS

## 2021-10-27 VITALS
DIASTOLIC BLOOD PRESSURE: 78 MMHG | TEMPERATURE: 98 F | WEIGHT: 172 LBS | SYSTOLIC BLOOD PRESSURE: 131 MMHG | HEART RATE: 63 BPM | BODY MASS INDEX: 31.65 KG/M2 | HEIGHT: 62 IN

## 2021-10-27 DIAGNOSIS — Z78.0 ASYMPTOMATIC AGE-RELATED POSTMENOPAUSAL STATE: ICD-10-CM

## 2021-10-27 DIAGNOSIS — Z12.31 VISIT FOR SCREENING MAMMOGRAM: ICD-10-CM

## 2021-10-27 DIAGNOSIS — Z23 IMMUNIZATION DUE: ICD-10-CM

## 2021-10-27 DIAGNOSIS — E78.5 HYPERLIPIDEMIA, UNSPECIFIED HYPERLIPIDEMIA TYPE: ICD-10-CM

## 2021-10-27 DIAGNOSIS — I10 PRIMARY HYPERTENSION: ICD-10-CM

## 2021-10-27 DIAGNOSIS — E78.00 PURE HYPERCHOLESTEROLEMIA: ICD-10-CM

## 2021-10-27 DIAGNOSIS — I47.10 PAROXYSMAL SUPRAVENTRICULAR TACHYCARDIA: ICD-10-CM

## 2021-10-27 DIAGNOSIS — E05.00 TOXIC DIFFUSE GOITER: ICD-10-CM

## 2021-10-27 DIAGNOSIS — I10 ESSENTIAL HYPERTENSION: ICD-10-CM

## 2021-10-27 DIAGNOSIS — Z00.00 ANNUAL PHYSICAL EXAM: Primary | ICD-10-CM

## 2021-10-27 LAB
BILIRUB UR QL STRIP: NEGATIVE
CLARITY UR: CLEAR
COLOR UR: YELLOW
GLUCOSE UR QL STRIP: NEGATIVE
HGB UR QL STRIP: NEGATIVE
KETONES UR QL STRIP: NEGATIVE
LEUKOCYTE ESTERASE UR QL STRIP: NEGATIVE
NITRITE UR QL STRIP: NEGATIVE
PH UR STRIP: 6 [PH] (ref 5–8)
PROT UR QL STRIP: NEGATIVE
SP GR UR STRIP: 1.01 (ref 1–1.03)
URN SPEC COLLECT METH UR: NORMAL

## 2021-10-27 PROCEDURE — 3075F SYST BP GE 130 - 139MM HG: CPT | Mod: CPTII,S$GLB,, | Performed by: FAMILY MEDICINE

## 2021-10-27 PROCEDURE — 1159F MED LIST DOCD IN RCRD: CPT | Mod: CPTII,S$GLB,, | Performed by: FAMILY MEDICINE

## 2021-10-27 PROCEDURE — 3008F PR BODY MASS INDEX (BMI) DOCUMENTED: ICD-10-PCS | Mod: CPTII,S$GLB,, | Performed by: FAMILY MEDICINE

## 2021-10-27 PROCEDURE — 3288F PR FALLS RISK ASSESSMENT DOCUMENTED: ICD-10-PCS | Mod: CPTII,S$GLB,, | Performed by: FAMILY MEDICINE

## 2021-10-27 PROCEDURE — 99214 PR OFFICE/OUTPT VISIT, EST, LEVL IV, 30-39 MIN: ICD-10-PCS | Mod: 25,S$GLB,, | Performed by: FAMILY MEDICINE

## 2021-10-27 PROCEDURE — 99214 OFFICE O/P EST MOD 30 MIN: CPT | Mod: 25,S$GLB,, | Performed by: FAMILY MEDICINE

## 2021-10-27 PROCEDURE — 77067 SCR MAMMO BI INCL CAD: CPT | Mod: 26,,, | Performed by: RADIOLOGY

## 2021-10-27 PROCEDURE — 3075F PR MOST RECENT SYSTOLIC BLOOD PRESS GE 130-139MM HG: ICD-10-PCS | Mod: CPTII,S$GLB,, | Performed by: FAMILY MEDICINE

## 2021-10-27 PROCEDURE — 77080 DXA BONE DENSITY AXIAL: CPT | Mod: TC,PO

## 2021-10-27 PROCEDURE — 77080 DEXA BONE DENSITY SPINE HIP: ICD-10-PCS | Mod: 26,,, | Performed by: RADIOLOGY

## 2021-10-27 PROCEDURE — 3078F PR MOST RECENT DIASTOLIC BLOOD PRESSURE < 80 MM HG: ICD-10-PCS | Mod: CPTII,S$GLB,, | Performed by: FAMILY MEDICINE

## 2021-10-27 PROCEDURE — 90471 FLU VACCINE - QUADRIVALENT - ADJUVANTED: ICD-10-PCS | Mod: S$GLB,,, | Performed by: FAMILY MEDICINE

## 2021-10-27 PROCEDURE — 77063 BREAST TOMOSYNTHESIS BI: CPT | Mod: 26,,, | Performed by: RADIOLOGY

## 2021-10-27 PROCEDURE — 1101F PR PT FALLS ASSESS DOC 0-1 FALLS W/OUT INJ PAST YR: ICD-10-PCS | Mod: CPTII,S$GLB,, | Performed by: FAMILY MEDICINE

## 2021-10-27 PROCEDURE — 99999 PR PBB SHADOW E&M-EST. PATIENT-LVL IV: CPT | Mod: PBBFAC,,, | Performed by: FAMILY MEDICINE

## 2021-10-27 PROCEDURE — 90694 VACC AIIV4 NO PRSRV 0.5ML IM: CPT | Mod: S$GLB,,, | Performed by: FAMILY MEDICINE

## 2021-10-27 PROCEDURE — 76536 US EXAM OF HEAD AND NECK: CPT | Mod: TC,PO

## 2021-10-27 PROCEDURE — 77063 MAMMO DIGITAL SCREENING BILAT WITH TOMO: ICD-10-PCS | Mod: 26,,, | Performed by: RADIOLOGY

## 2021-10-27 PROCEDURE — 81003 URINALYSIS AUTO W/O SCOPE: CPT | Mod: PO | Performed by: FAMILY MEDICINE

## 2021-10-27 PROCEDURE — 3008F BODY MASS INDEX DOCD: CPT | Mod: CPTII,S$GLB,, | Performed by: FAMILY MEDICINE

## 2021-10-27 PROCEDURE — 1159F PR MEDICATION LIST DOCUMENTED IN MEDICAL RECORD: ICD-10-PCS | Mod: CPTII,S$GLB,, | Performed by: FAMILY MEDICINE

## 2021-10-27 PROCEDURE — 90471 IMMUNIZATION ADMIN: CPT | Mod: S$GLB,,, | Performed by: FAMILY MEDICINE

## 2021-10-27 PROCEDURE — 76536 US SOFT TISSUE HEAD NECK THYROID: ICD-10-PCS | Mod: 26,,, | Performed by: RADIOLOGY

## 2021-10-27 PROCEDURE — 99999 PR PBB SHADOW E&M-EST. PATIENT-LVL IV: ICD-10-PCS | Mod: PBBFAC,,, | Performed by: FAMILY MEDICINE

## 2021-10-27 PROCEDURE — 77080 DXA BONE DENSITY AXIAL: CPT | Mod: 26,,, | Performed by: RADIOLOGY

## 2021-10-27 PROCEDURE — 77067 MAMMO DIGITAL SCREENING BILAT WITH TOMO: ICD-10-PCS | Mod: 26,,, | Performed by: RADIOLOGY

## 2021-10-27 PROCEDURE — 76536 US EXAM OF HEAD AND NECK: CPT | Mod: 26,,, | Performed by: RADIOLOGY

## 2021-10-27 PROCEDURE — 1101F PT FALLS ASSESS-DOCD LE1/YR: CPT | Mod: CPTII,S$GLB,, | Performed by: FAMILY MEDICINE

## 2021-10-27 PROCEDURE — 3078F DIAST BP <80 MM HG: CPT | Mod: CPTII,S$GLB,, | Performed by: FAMILY MEDICINE

## 2021-10-27 PROCEDURE — 3288F FALL RISK ASSESSMENT DOCD: CPT | Mod: CPTII,S$GLB,, | Performed by: FAMILY MEDICINE

## 2021-10-27 PROCEDURE — 90694 FLU VACCINE - QUADRIVALENT - ADJUVANTED: ICD-10-PCS | Mod: S$GLB,,, | Performed by: FAMILY MEDICINE

## 2021-10-27 PROCEDURE — 77067 SCR MAMMO BI INCL CAD: CPT | Mod: TC,PO

## 2021-10-27 RX ORDER — LEVOTHYROXINE SODIUM 100 UG/1
TABLET ORAL
Qty: 90 TABLET | Refills: 3 | Status: SHIPPED | OUTPATIENT
Start: 2021-10-27 | End: 2021-10-29

## 2021-10-27 RX ORDER — POTASSIUM CHLORIDE 20 MEQ/1
TABLET, EXTENDED RELEASE ORAL
Qty: 90 TABLET | Refills: 3 | Status: SHIPPED | OUTPATIENT
Start: 2021-10-27 | End: 2022-09-16

## 2021-10-27 RX ORDER — ROSUVASTATIN CALCIUM 10 MG/1
TABLET, COATED ORAL
Qty: 90 TABLET | Refills: 3 | Status: SHIPPED | OUTPATIENT
Start: 2021-10-27 | End: 2022-09-13

## 2021-10-27 RX ORDER — FUROSEMIDE 40 MG/1
40 TABLET ORAL 2 TIMES DAILY
Qty: 180 TABLET | Refills: 3 | Status: SHIPPED | OUTPATIENT
Start: 2021-10-27 | End: 2022-06-20 | Stop reason: SDUPTHER

## 2021-10-27 RX ORDER — METOPROLOL SUCCINATE 50 MG/1
TABLET, EXTENDED RELEASE ORAL
Qty: 90 TABLET | Refills: 3 | Status: SHIPPED | OUTPATIENT
Start: 2021-10-27 | End: 2022-06-20 | Stop reason: SDUPTHER

## 2021-10-29 ENCOUNTER — CLINICAL SUPPORT (OUTPATIENT)
Dept: FAMILY MEDICINE | Facility: CLINIC | Age: 66
End: 2021-10-29
Payer: COMMERCIAL

## 2021-10-29 DIAGNOSIS — E05.00 TOXIC DIFFUSE GOITER: Primary | ICD-10-CM

## 2021-10-29 DIAGNOSIS — Z23 IMMUNIZATION DUE: Primary | ICD-10-CM

## 2021-10-29 PROCEDURE — 90471 IMMUNIZATION ADMIN: CPT | Mod: S$GLB,,, | Performed by: FAMILY MEDICINE

## 2021-10-29 PROCEDURE — 99999 PR PBB SHADOW E&M-EST. PATIENT-LVL II: CPT | Mod: PBBFAC,,,

## 2021-10-29 PROCEDURE — 99999 PR PBB SHADOW E&M-EST. PATIENT-LVL II: ICD-10-PCS | Mod: PBBFAC,,,

## 2021-10-29 PROCEDURE — 90471 PNEUMOCOCCAL POLYSACCHARIDE VACCINE 23-VALENT =>2YO SQ IM: ICD-10-PCS | Mod: S$GLB,,, | Performed by: FAMILY MEDICINE

## 2021-10-29 PROCEDURE — 90732 PNEUMOCOCCAL POLYSACCHARIDE VACCINE 23-VALENT =>2YO SQ IM: ICD-10-PCS | Mod: S$GLB,,, | Performed by: FAMILY MEDICINE

## 2021-10-29 PROCEDURE — 90732 PPSV23 VACC 2 YRS+ SUBQ/IM: CPT | Mod: S$GLB,,, | Performed by: FAMILY MEDICINE

## 2021-10-29 RX ORDER — LEVOTHYROXINE SODIUM 112 UG/1
112 TABLET ORAL
Qty: 30 TABLET | Refills: 11 | Status: SHIPPED | OUTPATIENT
Start: 2021-10-29 | End: 2021-12-30

## 2021-12-28 ENCOUNTER — PATIENT MESSAGE (OUTPATIENT)
Dept: FAMILY MEDICINE | Facility: CLINIC | Age: 66
End: 2021-12-28
Payer: COMMERCIAL

## 2021-12-30 ENCOUNTER — LAB VISIT (OUTPATIENT)
Dept: LAB | Facility: HOSPITAL | Age: 66
End: 2021-12-30
Attending: FAMILY MEDICINE
Payer: COMMERCIAL

## 2021-12-30 DIAGNOSIS — E05.00 TOXIC DIFFUSE GOITER: ICD-10-CM

## 2021-12-30 LAB — TSH SERPL DL<=0.005 MIU/L-ACNC: 1.85 UIU/ML (ref 0.4–4)

## 2021-12-30 PROCEDURE — 84443 ASSAY THYROID STIM HORMONE: CPT | Performed by: FAMILY MEDICINE

## 2021-12-30 PROCEDURE — 36415 COLL VENOUS BLD VENIPUNCTURE: CPT | Mod: PO | Performed by: FAMILY MEDICINE

## 2021-12-30 RX ORDER — LEVOTHYROXINE SODIUM 125 UG/1
125 TABLET ORAL DAILY
Qty: 30 TABLET | Refills: 11 | Status: SHIPPED | OUTPATIENT
Start: 2021-12-30 | End: 2022-11-10 | Stop reason: SDUPTHER

## 2022-02-14 ENCOUNTER — TELEPHONE (OUTPATIENT)
Dept: DERMATOLOGY | Facility: CLINIC | Age: 67
End: 2022-02-14
Payer: COMMERCIAL

## 2022-02-14 NOTE — TELEPHONE ENCOUNTER
----- Message from Natalie Mandujano sent at 2/14/2022 11:13 AM CST -----  Regarding: sooner appt 04/17  Type:  Sooner Apoointment Request    Caller is requesting a sooner appointment.  Caller declined first available appointment listed below.  Caller will not accept being placed on the waitlist and is requesting a message be sent to doctor.    Name of Caller:  Alexi   When is the first available appointment? 4/17  Symptoms: annual visit  Best Call Back Number:   [phone]    Additional Information:

## 2022-03-04 ENCOUNTER — LAB VISIT (OUTPATIENT)
Dept: LAB | Facility: HOSPITAL | Age: 67
End: 2022-03-04
Attending: FAMILY MEDICINE
Payer: COMMERCIAL

## 2022-03-04 DIAGNOSIS — E05.00 TOXIC DIFFUSE GOITER: ICD-10-CM

## 2022-03-04 LAB — TSH SERPL DL<=0.005 MIU/L-ACNC: 0.59 UIU/ML (ref 0.4–4)

## 2022-03-04 PROCEDURE — 36415 COLL VENOUS BLD VENIPUNCTURE: CPT | Mod: PO | Performed by: FAMILY MEDICINE

## 2022-03-04 PROCEDURE — 84443 ASSAY THYROID STIM HORMONE: CPT | Performed by: FAMILY MEDICINE

## 2022-03-23 ENCOUNTER — OFFICE VISIT (OUTPATIENT)
Dept: CARDIOLOGY | Facility: CLINIC | Age: 67
End: 2022-03-23
Payer: COMMERCIAL

## 2022-03-23 VITALS
SYSTOLIC BLOOD PRESSURE: 130 MMHG | OXYGEN SATURATION: 98 % | WEIGHT: 179.38 LBS | DIASTOLIC BLOOD PRESSURE: 78 MMHG | HEART RATE: 71 BPM | BODY MASS INDEX: 33.01 KG/M2 | HEIGHT: 62 IN

## 2022-03-23 DIAGNOSIS — I47.10 PAROXYSMAL SUPRAVENTRICULAR TACHYCARDIA: ICD-10-CM

## 2022-03-23 DIAGNOSIS — E78.00 PURE HYPERCHOLESTEROLEMIA: ICD-10-CM

## 2022-03-23 DIAGNOSIS — I10 PRIMARY HYPERTENSION: ICD-10-CM

## 2022-03-23 DIAGNOSIS — Q21.11 OSTIUM SECUNDUM TYPE ATRIAL SEPTAL DEFECT: Primary | ICD-10-CM

## 2022-03-23 PROCEDURE — 99214 PR OFFICE/OUTPT VISIT, EST, LEVL IV, 30-39 MIN: ICD-10-PCS | Mod: S$GLB,,, | Performed by: INTERNAL MEDICINE

## 2022-03-23 PROCEDURE — 3078F DIAST BP <80 MM HG: CPT | Mod: CPTII,S$GLB,, | Performed by: INTERNAL MEDICINE

## 2022-03-23 PROCEDURE — 99999 PR PBB SHADOW E&M-EST. PATIENT-LVL IV: CPT | Mod: PBBFAC,,, | Performed by: INTERNAL MEDICINE

## 2022-03-23 PROCEDURE — 3008F PR BODY MASS INDEX (BMI) DOCUMENTED: ICD-10-PCS | Mod: CPTII,S$GLB,, | Performed by: INTERNAL MEDICINE

## 2022-03-23 PROCEDURE — 1126F AMNT PAIN NOTED NONE PRSNT: CPT | Mod: CPTII,S$GLB,, | Performed by: INTERNAL MEDICINE

## 2022-03-23 PROCEDURE — 99214 OFFICE O/P EST MOD 30 MIN: CPT | Mod: S$GLB,,, | Performed by: INTERNAL MEDICINE

## 2022-03-23 PROCEDURE — 1126F PR PAIN SEVERITY QUANTIFIED, NO PAIN PRESENT: ICD-10-PCS | Mod: CPTII,S$GLB,, | Performed by: INTERNAL MEDICINE

## 2022-03-23 PROCEDURE — 1159F MED LIST DOCD IN RCRD: CPT | Mod: CPTII,S$GLB,, | Performed by: INTERNAL MEDICINE

## 2022-03-23 PROCEDURE — 99999 PR PBB SHADOW E&M-EST. PATIENT-LVL IV: ICD-10-PCS | Mod: PBBFAC,,, | Performed by: INTERNAL MEDICINE

## 2022-03-23 PROCEDURE — 3078F PR MOST RECENT DIASTOLIC BLOOD PRESSURE < 80 MM HG: ICD-10-PCS | Mod: CPTII,S$GLB,, | Performed by: INTERNAL MEDICINE

## 2022-03-23 PROCEDURE — 3075F SYST BP GE 130 - 139MM HG: CPT | Mod: CPTII,S$GLB,, | Performed by: INTERNAL MEDICINE

## 2022-03-23 PROCEDURE — 1159F PR MEDICATION LIST DOCUMENTED IN MEDICAL RECORD: ICD-10-PCS | Mod: CPTII,S$GLB,, | Performed by: INTERNAL MEDICINE

## 2022-03-23 PROCEDURE — 1101F PR PT FALLS ASSESS DOC 0-1 FALLS W/OUT INJ PAST YR: ICD-10-PCS | Mod: CPTII,S$GLB,, | Performed by: INTERNAL MEDICINE

## 2022-03-23 PROCEDURE — 1101F PT FALLS ASSESS-DOCD LE1/YR: CPT | Mod: CPTII,S$GLB,, | Performed by: INTERNAL MEDICINE

## 2022-03-23 PROCEDURE — 3288F FALL RISK ASSESSMENT DOCD: CPT | Mod: CPTII,S$GLB,, | Performed by: INTERNAL MEDICINE

## 2022-03-23 PROCEDURE — 3008F BODY MASS INDEX DOCD: CPT | Mod: CPTII,S$GLB,, | Performed by: INTERNAL MEDICINE

## 2022-03-23 PROCEDURE — 3288F PR FALLS RISK ASSESSMENT DOCUMENTED: ICD-10-PCS | Mod: CPTII,S$GLB,, | Performed by: INTERNAL MEDICINE

## 2022-03-23 PROCEDURE — 1160F RVW MEDS BY RX/DR IN RCRD: CPT | Mod: CPTII,S$GLB,, | Performed by: INTERNAL MEDICINE

## 2022-03-23 PROCEDURE — 1160F PR REVIEW ALL MEDS BY PRESCRIBER/CLIN PHARMACIST DOCUMENTED: ICD-10-PCS | Mod: CPTII,S$GLB,, | Performed by: INTERNAL MEDICINE

## 2022-03-23 PROCEDURE — 3075F PR MOST RECENT SYSTOLIC BLOOD PRESS GE 130-139MM HG: ICD-10-PCS | Mod: CPTII,S$GLB,, | Performed by: INTERNAL MEDICINE

## 2022-03-23 NOTE — PROGRESS NOTES
Subjective:   Patient ID:  Mallorychiara David is a 66 y.o. female who presents for follow-up of Follow-up  Pt with episodes of dizziness? No LOC. Pt denies palpitations.  Patient denies CP, angina or anginal equivalent.    Hypertension  This is a chronic problem. The current episode started more than 1 year ago. The problem has been gradually improving since onset. The problem is controlled. Pertinent negatives include no chest pain, palpitations or shortness of breath. Past treatments include beta blockers and diuretics. The current treatment provides moderate improvement. There are no compliance problems.    Hyperlipidemia  This is a chronic problem. The current episode started more than 1 year ago. The problem is controlled. Pertinent negatives include no chest pain or shortness of breath. Current antihyperlipidemic treatment includes statins. The current treatment provides moderate improvement of lipids. There are no compliance problems.    Edema  This is a chronic problem. The current episode started more than 1 year ago. The problem occurs rarely. The problem has been gradually improving. Pertinent negatives include no chest pain. Nothing aggravates the symptoms. She has tried rest for the symptoms.   Dizziness:   Chronicity:  New  Onset:  1 to 4 weeks ago  Progression since onset:  Waxing and waning  Severity:  Mild  Duration:  Very brief  Dizziness characteristics:  Lightheaded/impending faintno palpitations and no chest pain.  Aggravated by:  Nothing  Treatments tried:  Rest  Improvements on treatment:  Moderate      Review of Systems   Constitutional: Negative. Negative for weight gain.   HENT: Negative.    Eyes: Negative.    Cardiovascular: Negative.  Negative for chest pain, leg swelling and palpitations.   Respiratory: Negative.  Negative for shortness of breath.    Endocrine: Negative.    Hematologic/Lymphatic: Negative.    Skin: Negative.    Musculoskeletal: Negative for muscle weakness.    Gastrointestinal: Negative.    Genitourinary: Negative.    Neurological: Negative.  Negative for dizziness.   Psychiatric/Behavioral: Negative.    Allergic/Immunologic: Negative.      Family History   Problem Relation Age of Onset    Hypertension Mother     Arthritis Mother     Hearing loss Mother         cholesteatoma surgery    Cancer Father         colon    Diabetes Father     Breast cancer Sister     Cancer Sister         Breast    Aneurysm Brother         this was of his heart.    Birth defects Brother         Heart murmer    Heart disease Brother     Hypertension Brother     Diabetes Brother     Diabetes Brother     Hypertension Brother     Diabetes Sister     Heart disease Sister     Hypertension Sister     Stroke Sister     Melanoma Neg Hx      Past Medical History:   Diagnosis Date    *Atrial flutter     s/p ablation    Abnormal Pap smear of cervix     ASD (atrial septal defect), ostium secundum     repaired    Benign hypertensive heart disease without heart failure     Endometriosis     Fatty liver     Hyperlipidemia     Hypertension     2009    Nodular goiter (nontoxic)     1999    Special screening for malignant neoplasms, colon 10/28/2014     Social History     Socioeconomic History    Marital status:      Spouse name: Alexi    Number of children: 2   Occupational History    Occupation:      Employer: [employer]   Tobacco Use    Smoking status: Never Smoker    Smokeless tobacco: Never Used   Substance and Sexual Activity    Alcohol use: Yes     Alcohol/week: 0.0 standard drinks     Comment: occasional glass of red wine    Drug use: No    Sexual activity: Not Currently     Partners: Male     Birth control/protection: Post-menopausal     Social Determinants of Health     Financial Resource Strain: Low Risk     Difficulty of Paying Living Expenses: Not very hard   Food Insecurity: No Food Insecurity    Worried About Running Out of Food in  the Last Year: Never true    Ran Out of Food in the Last Year: Never true   Transportation Needs: No Transportation Needs    Lack of Transportation (Medical): No    Lack of Transportation (Non-Medical): No   Physical Activity: Insufficiently Active    Days of Exercise per Week: 2 days    Minutes of Exercise per Session: 20 min   Stress: Stress Concern Present    Feeling of Stress : To some extent   Social Connections: Unknown    Frequency of Communication with Friends and Family: More than three times a week    Frequency of Social Gatherings with Friends and Family: Once a week    Active Member of Clubs or Organizations: Yes    Attends Club or Organization Meetings: 1 to 4 times per year    Marital Status:    Housing Stability: Low Risk     Unable to Pay for Housing in the Last Year: No    Number of Places Lived in the Last Year: 1    Unstable Housing in the Last Year: No     Current Outpatient Medications on File Prior to Visit   Medication Sig Dispense Refill    ANTIOX #11/OM3/DHA/EPA/LUT/NAMAN (50+ ADULT EYE HEALTH ORAL) Take by mouth.      aspirin 81 MG chewable tablet Take 81 mg by mouth once daily.      cetirizine 10 mg Cap Zyrtec   1 po q day      furosemide (LASIX) 40 MG tablet Take 1 tablet (40 mg total) by mouth 2 (two) times daily. 180 tablet 3    levothyroxine (SYNTHROID) 125 MCG tablet Take 1 tablet (125 mcg total) by mouth once daily. 30 tablet 11    MAGNESIUM ORAL Take by mouth once daily.      metoprolol succinate (TOPROL-XL) 50 MG 24 hr tablet TAKE 1 TABLET BY MOUTH 1 TIME A DAY 90 tablet 3    mometasone (ELOCON) 0.1 % ointment aaa bid x 1-2 weeks then prn 45 g 0    potassium chloride SA (K-DUR,KLOR-CON) 20 MEQ tablet TAKE 1 TABLET BY MOUTH 1 TIME A DAY 90 tablet 3    rosuvastatin (CRESTOR) 10 MG tablet TAKE 1 TABLET BY MOUTH 1 TIME A DAY 90 tablet 3     No current facility-administered medications on file prior to visit.     Review of patient's allergies indicates:    Allergen Reactions    No known drug allergies        Objective:     Physical Exam  Vitals and nursing note reviewed.   Constitutional:       Appearance: She is well-developed.   HENT:      Head: Normocephalic and atraumatic.   Eyes:      Conjunctiva/sclera: Conjunctivae normal.      Pupils: Pupils are equal, round, and reactive to light.   Cardiovascular:      Rate and Rhythm: Normal rate and regular rhythm.      Pulses: Intact distal pulses.      Heart sounds: Normal heart sounds.   Pulmonary:      Effort: Pulmonary effort is normal.      Breath sounds: Normal breath sounds.   Abdominal:      General: Bowel sounds are normal.      Palpations: Abdomen is soft.   Musculoskeletal:         General: Normal range of motion.      Cervical back: Normal range of motion and neck supple.   Skin:     General: Skin is warm and dry.   Neurological:      Mental Status: She is alert and oriented to person, place, and time.         Assessment:     1. Ostium secundum type atrial septal defect    2. Paroxysmal supraventricular tachycardia    3. Primary hypertension    4. Pure hypercholesterolemia        Plan:     Ostium secundum type atrial septal defect    Paroxysmal supraventricular tachycardia    Primary hypertension    Pure hypercholesterolemia      Continue metoprolol, lasix-htn/svt  Continue statin-hlp    Cardiac/event monitor

## 2022-03-28 ENCOUNTER — HOSPITAL ENCOUNTER (OUTPATIENT)
Dept: CARDIOLOGY | Facility: HOSPITAL | Age: 67
Discharge: HOME | End: 2022-03-28
Attending: INTERNAL MEDICINE
Payer: COMMERCIAL

## 2022-03-28 DIAGNOSIS — I47.10 PAROXYSMAL SUPRAVENTRICULAR TACHYCARDIA: ICD-10-CM

## 2022-03-28 PROCEDURE — 93247 EXT ECG>7D<15D SCAN A/R: CPT | Mod: PO

## 2022-03-28 PROCEDURE — 93248 EXT ECG>7D<15D REV&INTERPJ: CPT | Mod: ,,, | Performed by: INTERNAL MEDICINE

## 2022-03-28 PROCEDURE — 93248 CV CARDIAC MONITOR - 3-15 DAY ADULT (CUPID ONLY): ICD-10-PCS | Mod: ,,, | Performed by: INTERNAL MEDICINE

## 2022-04-15 LAB
OHS CV HOLTER SINUS AVERAGE HR: 73
OHS CV HOLTER SINUS MAX HR: 113
OHS CV HOLTER SINUS MIN HR: 50

## 2022-04-22 ENCOUNTER — PATIENT MESSAGE (OUTPATIENT)
Dept: CARDIOLOGY | Facility: CLINIC | Age: 67
End: 2022-04-22
Payer: COMMERCIAL

## 2022-04-25 ENCOUNTER — TELEPHONE (OUTPATIENT)
Dept: CARDIOLOGY | Facility: CLINIC | Age: 67
End: 2022-04-25
Payer: COMMERCIAL

## 2022-04-25 NOTE — TELEPHONE ENCOUNTER
Called patient to discuss monitor results. Pt verbalized understanding and stated she will try taking her metoprolol BID to see how it works. All questions answered. Pt will call back with any other questions or concerns.     ----- Message from Anthony Graham MD sent at 4/17/2022  7:10 AM CDT -----  Please tell pt:  Holter shows NSR, episodes of nonsustained atrial tachycardia  If have palpitations increase metoprolol to bid

## 2022-05-17 ENCOUNTER — OFFICE VISIT (OUTPATIENT)
Dept: DERMATOLOGY | Facility: CLINIC | Age: 67
End: 2022-05-17
Payer: COMMERCIAL

## 2022-05-17 VITALS — HEIGHT: 62 IN | WEIGHT: 179.44 LBS | BODY MASS INDEX: 33.02 KG/M2 | RESPIRATION RATE: 18 BRPM

## 2022-05-17 DIAGNOSIS — L82.1 SEBORRHEIC KERATOSES: ICD-10-CM

## 2022-05-17 DIAGNOSIS — Z12.83 SKIN CANCER SCREENING: ICD-10-CM

## 2022-05-17 DIAGNOSIS — L23.1 ALLERGIC CONTACT DERMATITIS DUE TO ADHESIVES: ICD-10-CM

## 2022-05-17 DIAGNOSIS — D22.9 MULTIPLE BENIGN NEVI: ICD-10-CM

## 2022-05-17 DIAGNOSIS — L30.9 DERMATITIS: Primary | ICD-10-CM

## 2022-05-17 PROCEDURE — 99999 PR PBB SHADOW E&M-EST. PATIENT-LVL III: ICD-10-PCS | Mod: PBBFAC,,, | Performed by: DERMATOLOGY

## 2022-05-17 PROCEDURE — 3008F PR BODY MASS INDEX (BMI) DOCUMENTED: ICD-10-PCS | Mod: CPTII,S$GLB,, | Performed by: DERMATOLOGY

## 2022-05-17 PROCEDURE — 1159F PR MEDICATION LIST DOCUMENTED IN MEDICAL RECORD: ICD-10-PCS | Mod: CPTII,S$GLB,, | Performed by: DERMATOLOGY

## 2022-05-17 PROCEDURE — 1101F PR PT FALLS ASSESS DOC 0-1 FALLS W/OUT INJ PAST YR: ICD-10-PCS | Mod: CPTII,S$GLB,, | Performed by: DERMATOLOGY

## 2022-05-17 PROCEDURE — 1126F AMNT PAIN NOTED NONE PRSNT: CPT | Mod: CPTII,S$GLB,, | Performed by: DERMATOLOGY

## 2022-05-17 PROCEDURE — 1159F MED LIST DOCD IN RCRD: CPT | Mod: CPTII,S$GLB,, | Performed by: DERMATOLOGY

## 2022-05-17 PROCEDURE — 3008F BODY MASS INDEX DOCD: CPT | Mod: CPTII,S$GLB,, | Performed by: DERMATOLOGY

## 2022-05-17 PROCEDURE — 99999 PR PBB SHADOW E&M-EST. PATIENT-LVL III: CPT | Mod: PBBFAC,,, | Performed by: DERMATOLOGY

## 2022-05-17 PROCEDURE — 99214 OFFICE O/P EST MOD 30 MIN: CPT | Mod: S$GLB,,, | Performed by: DERMATOLOGY

## 2022-05-17 PROCEDURE — 3288F FALL RISK ASSESSMENT DOCD: CPT | Mod: CPTII,S$GLB,, | Performed by: DERMATOLOGY

## 2022-05-17 PROCEDURE — 1126F PR PAIN SEVERITY QUANTIFIED, NO PAIN PRESENT: ICD-10-PCS | Mod: CPTII,S$GLB,, | Performed by: DERMATOLOGY

## 2022-05-17 PROCEDURE — 3288F PR FALLS RISK ASSESSMENT DOCUMENTED: ICD-10-PCS | Mod: CPTII,S$GLB,, | Performed by: DERMATOLOGY

## 2022-05-17 PROCEDURE — 99214 PR OFFICE/OUTPT VISIT, EST, LEVL IV, 30-39 MIN: ICD-10-PCS | Mod: S$GLB,,, | Performed by: DERMATOLOGY

## 2022-05-17 PROCEDURE — 1101F PT FALLS ASSESS-DOCD LE1/YR: CPT | Mod: CPTII,S$GLB,, | Performed by: DERMATOLOGY

## 2022-05-17 RX ORDER — MOMETASONE FUROATE 1 MG/G
OINTMENT TOPICAL
Qty: 45 G | Refills: 0 | Status: SHIPPED | OUTPATIENT
Start: 2022-05-17

## 2022-05-17 NOTE — PROGRESS NOTES
Subjective:       Patient ID:  Mallorychiara David is a 66 y.o. female who presents for   Chief Complaint   Patient presents with    Skin Check     Patient present for skin check. LOV 7/15/21 by LT.    The patient denies any lesions at present that are changing in color, increasing in size, painful, itching, or bleeding.   C/o sensitive skin- yard work, bug bites develops rash/itching. Mometasone is helpful when flares. Desires refill.       no Phx of NMSC.  Fhx of NMSC  + Mother and Father hx of BCC  no Fhx of melanoma    Past Medical History:  No date: *Atrial flutter      Comment:  s/p ablation  No date: Abnormal Pap smear of cervix  No date: ASD (atrial septal defect), ostium secundum      Comment:  repaired  No date: Benign hypertensive heart disease without heart failure  No date: Endometriosis  No date: Fatty liver  No date: Hyperlipidemia  No date: Hypertension      Comment:  2009  No date: Nodular goiter (nontoxic)      Comment:  1999  10/28/2014: Special screening for malignant neoplasms, colon        Review of Systems   Constitutional: Negative for fever, chills and fatigue.   Skin: Positive for daily sunscreen use, activity-related sunscreen use and wears hat.   Hematologic/Lymphatic: Does not bruise/bleed easily.        Objective:    Physical Exam   Constitutional: She appears well-developed and well-nourished. No distress.   HENT:   Mouth/Throat: Lips normal.    Eyes: Lids are normal.  No conjunctival no injection.   Cardiovascular: There is no local extremity swelling and no dependent edema.     Neurological: She is alert and oriented to person, place, and time. She is not disoriented.   Psychiatric: She has a normal mood and affect.   Skin:   Areas Examined (abnormalities noted in diagram):   Scalp / Hair Palpated and Inspected  Head / Face Inspection Performed  Neck Inspection Performed  Chest / Axilla Inspection Performed  Abdomen Inspection Performed  Genitals / Buttocks / Groin Inspection  Performed  Back Inspection Performed  RUE Inspected  LUE Inspection Performed  RLE Inspected  LLE Inspection Performed  Nails and Digits Inspection Performed                   Diagram Legend     Erythematous scaling macule/papule c/w actinic keratosis       Vascular papule c/w angioma      Pigmented verrucoid papule/plaque c/w seborrheic keratosis      Yellow umbilicated papule c/w sebaceous hyperplasia      Irregularly shaped tan macule c/w lentigo     1-2 mm smooth white papules consistent with Milia      Movable subcutaneous cyst with punctum c/w epidermal inclusion cyst      Subcutaneous movable cyst c/w pilar cyst      Firm pink to brown papule c/w dermatofibroma      Pedunculated fleshy papule(s) c/w skin tag(s)      Evenly pigmented macule c/w junctional nevus     Mildly variegated pigmented, slightly irregular-bordered macule c/w mildly atypical nevus      Flesh colored to evenly pigmented papule c/w intradermal nevus       Pink pearly papule/plaque c/w basal cell carcinoma      Erythematous hyperkeratotic cursted plaque c/w SCC      Surgical scar with no sign of skin cancer recurrence      Open and closed comedones      Inflammatory papules and pustules      Verrucoid papule consistent consistent with wart     Erythematous eczematous patches and plaques     Dystrophic onycholytic nail with subungual debris c/w onychomycosis     Umbilicated papule    Erythematous-base heme-crusted tan verrucoid plaque consistent with inflamed seborrheic keratosis     Erythematous Silvery Scaling Plaque c/w Psoriasis     See annotation      Assessment / Plan:        Dermatitis  Mometasone ointment, see below    Skin cancer screening  Total body skin examination performed today including at least 12 points as noted in physical examination. No lesions suspicious for malignancy noted.      Multiple benign nevi  wnl on dermoscopy  Reassurance     Allergic contact dermatitis due to adhesives  -     mometasone (ELOCON) 0.1 %  ointment; aaa bid x 1-2 weeks then prn  Dispense: 45 g; Refill: 0    Seborrheic keratoses    These are benign inherited growths without a malignant potential. Reassurance given to patient. No treatment is necessary.            Follow up in about 6 months (around 11/17/2022).

## 2022-05-24 ENCOUNTER — OFFICE VISIT (OUTPATIENT)
Dept: CARDIOLOGY | Facility: CLINIC | Age: 67
End: 2022-05-24
Payer: COMMERCIAL

## 2022-05-24 VITALS
HEIGHT: 62 IN | OXYGEN SATURATION: 98 % | DIASTOLIC BLOOD PRESSURE: 66 MMHG | WEIGHT: 178.69 LBS | SYSTOLIC BLOOD PRESSURE: 122 MMHG | HEART RATE: 84 BPM | BODY MASS INDEX: 32.88 KG/M2

## 2022-05-24 DIAGNOSIS — I10 PRIMARY HYPERTENSION: ICD-10-CM

## 2022-05-24 DIAGNOSIS — E78.00 PURE HYPERCHOLESTEROLEMIA: ICD-10-CM

## 2022-05-24 DIAGNOSIS — I47.10 PAROXYSMAL SUPRAVENTRICULAR TACHYCARDIA: Primary | ICD-10-CM

## 2022-05-24 DIAGNOSIS — Q21.11 ASD (ATRIAL SEPTAL DEFECT), OSTIUM SECUNDUM: ICD-10-CM

## 2022-05-24 PROCEDURE — 3008F BODY MASS INDEX DOCD: CPT | Mod: CPTII,S$GLB,, | Performed by: INTERNAL MEDICINE

## 2022-05-24 PROCEDURE — 1159F PR MEDICATION LIST DOCUMENTED IN MEDICAL RECORD: ICD-10-PCS | Mod: CPTII,S$GLB,, | Performed by: INTERNAL MEDICINE

## 2022-05-24 PROCEDURE — 3288F FALL RISK ASSESSMENT DOCD: CPT | Mod: CPTII,S$GLB,, | Performed by: INTERNAL MEDICINE

## 2022-05-24 PROCEDURE — 99214 OFFICE O/P EST MOD 30 MIN: CPT | Mod: S$GLB,,, | Performed by: INTERNAL MEDICINE

## 2022-05-24 PROCEDURE — 3288F PR FALLS RISK ASSESSMENT DOCUMENTED: ICD-10-PCS | Mod: CPTII,S$GLB,, | Performed by: INTERNAL MEDICINE

## 2022-05-24 PROCEDURE — 3008F PR BODY MASS INDEX (BMI) DOCUMENTED: ICD-10-PCS | Mod: CPTII,S$GLB,, | Performed by: INTERNAL MEDICINE

## 2022-05-24 PROCEDURE — 3074F SYST BP LT 130 MM HG: CPT | Mod: CPTII,S$GLB,, | Performed by: INTERNAL MEDICINE

## 2022-05-24 PROCEDURE — 99999 PR PBB SHADOW E&M-EST. PATIENT-LVL IV: CPT | Mod: PBBFAC,,, | Performed by: INTERNAL MEDICINE

## 2022-05-24 PROCEDURE — 1101F PR PT FALLS ASSESS DOC 0-1 FALLS W/OUT INJ PAST YR: ICD-10-PCS | Mod: CPTII,S$GLB,, | Performed by: INTERNAL MEDICINE

## 2022-05-24 PROCEDURE — 1101F PT FALLS ASSESS-DOCD LE1/YR: CPT | Mod: CPTII,S$GLB,, | Performed by: INTERNAL MEDICINE

## 2022-05-24 PROCEDURE — 99999 PR PBB SHADOW E&M-EST. PATIENT-LVL IV: ICD-10-PCS | Mod: PBBFAC,,, | Performed by: INTERNAL MEDICINE

## 2022-05-24 PROCEDURE — 1126F PR PAIN SEVERITY QUANTIFIED, NO PAIN PRESENT: ICD-10-PCS | Mod: CPTII,S$GLB,, | Performed by: INTERNAL MEDICINE

## 2022-05-24 PROCEDURE — 3078F DIAST BP <80 MM HG: CPT | Mod: CPTII,S$GLB,, | Performed by: INTERNAL MEDICINE

## 2022-05-24 PROCEDURE — 3074F PR MOST RECENT SYSTOLIC BLOOD PRESSURE < 130 MM HG: ICD-10-PCS | Mod: CPTII,S$GLB,, | Performed by: INTERNAL MEDICINE

## 2022-05-24 PROCEDURE — 99214 PR OFFICE/OUTPT VISIT, EST, LEVL IV, 30-39 MIN: ICD-10-PCS | Mod: S$GLB,,, | Performed by: INTERNAL MEDICINE

## 2022-05-24 PROCEDURE — 3078F PR MOST RECENT DIASTOLIC BLOOD PRESSURE < 80 MM HG: ICD-10-PCS | Mod: CPTII,S$GLB,, | Performed by: INTERNAL MEDICINE

## 2022-05-24 PROCEDURE — 1159F MED LIST DOCD IN RCRD: CPT | Mod: CPTII,S$GLB,, | Performed by: INTERNAL MEDICINE

## 2022-05-24 PROCEDURE — 1126F AMNT PAIN NOTED NONE PRSNT: CPT | Mod: CPTII,S$GLB,, | Performed by: INTERNAL MEDICINE

## 2022-05-24 NOTE — PROGRESS NOTES
Subjective:   Patient ID:  Malloryleslee David is a 66 y.o. female who presents for follow-up of Follow-up and Leg Swelling  Event monitor:  Conclusion    · Heart rates varied between 50 and 113 BPM with an average of 73 BPM. - Predominant rhythm: NSR  · Atrial Tachycardia (AT) 7 episodes, Longest 30 beats @ Avg 127 bpm up to 205 bpm, Fastest 8 beats @ Avg 163 bpm up to 212 bpm - Ectopic Atrial Rhythm (EAR) - PAC 0.05 % - PVC 0.01 %      BP diary at goal.  Lasix regimen 80 am 40 pm  Hypertension  This is a chronic problem. The current episode started more than 1 year ago. The problem has been gradually improving since onset. The problem is controlled. Pertinent negatives include no chest pain, palpitations or shortness of breath. Past treatments include beta blockers and diuretics. The current treatment provides moderate improvement. There are no compliance problems.    Hyperlipidemia  This is a chronic problem. The current episode started more than 1 year ago. The problem is controlled. Pertinent negatives include no chest pain or shortness of breath. Current antihyperlipidemic treatment includes statins. The current treatment provides moderate improvement of lipids. There are no compliance problems.    Edema  This is a chronic problem. The current episode started more than 1 year ago. The problem occurs rarely. The problem has been gradually improving. Pertinent negatives include no chest pain. Nothing aggravates the symptoms. She has tried rest for the symptoms.   Dizziness:   Chronicity:  New  Onset:  1 to 4 weeks ago  Progression since onset:  Waxing and waning  Severity:  Mild  Duration:  Very brief  Dizziness characteristics:  Lightheaded/impending faintno palpitations and no chest pain.  Aggravated by:  Nothing  Treatments tried:  Rest  Improvements on treatment:  Moderate      Review of Systems   Constitutional: Negative. Negative for weight gain.   HENT: Negative.    Eyes: Negative.    Cardiovascular: Negative.   Negative for chest pain, leg swelling and palpitations.   Respiratory: Negative.  Negative for shortness of breath.    Endocrine: Negative.    Hematologic/Lymphatic: Negative.    Skin: Negative.    Musculoskeletal: Negative for muscle weakness.   Gastrointestinal: Negative.    Genitourinary: Negative.    Neurological: Negative.  Negative for dizziness.   Psychiatric/Behavioral: Negative.    Allergic/Immunologic: Negative.      Family History   Problem Relation Age of Onset    Hypertension Mother     Arthritis Mother     Hearing loss Mother         cholesteatoma surgery    Cancer Father         colon    Diabetes Father     Breast cancer Sister     Cancer Sister         Breast    Aneurysm Brother         this was of his heart.    Birth defects Brother         Heart murmer    Heart disease Brother     Hypertension Brother     Diabetes Brother     Diabetes Brother     Hypertension Brother     Diabetes Sister     Heart disease Sister     Hypertension Sister     Stroke Sister     Melanoma Neg Hx      Past Medical History:   Diagnosis Date    *Atrial flutter     s/p ablation    Abnormal Pap smear of cervix     ASD (atrial septal defect), ostium secundum     repaired    Benign hypertensive heart disease without heart failure     Endometriosis     Fatty liver     Hyperlipidemia     Hypertension     2009    Nodular goiter (nontoxic)     1999    Special screening for malignant neoplasms, colon 10/28/2014     Social History     Socioeconomic History    Marital status:      Spouse name: Alexi    Number of children: 2   Occupational History    Occupation:      Employer: [employer]   Tobacco Use    Smoking status: Never Smoker    Smokeless tobacco: Never Used   Substance and Sexual Activity    Alcohol use: Yes     Alcohol/week: 0.0 standard drinks     Comment: occasional glass of red wine    Drug use: No    Sexual activity: Not Currently     Partners: Male     Birth  control/protection: Post-menopausal     Social Determinants of Health     Financial Resource Strain: Low Risk     Difficulty of Paying Living Expenses: Not very hard   Food Insecurity: No Food Insecurity    Worried About Running Out of Food in the Last Year: Never true    Ran Out of Food in the Last Year: Never true   Transportation Needs: No Transportation Needs    Lack of Transportation (Medical): No    Lack of Transportation (Non-Medical): No   Physical Activity: Insufficiently Active    Days of Exercise per Week: 2 days    Minutes of Exercise per Session: 20 min   Stress: Stress Concern Present    Feeling of Stress : To some extent   Social Connections: Unknown    Frequency of Communication with Friends and Family: More than three times a week    Frequency of Social Gatherings with Friends and Family: Once a week    Active Member of Clubs or Organizations: Yes    Attends Club or Organization Meetings: 1 to 4 times per year    Marital Status:    Housing Stability: Low Risk     Unable to Pay for Housing in the Last Year: No    Number of Places Lived in the Last Year: 1    Unstable Housing in the Last Year: No     Current Outpatient Medications on File Prior to Visit   Medication Sig Dispense Refill    aspirin 81 MG chewable tablet Take 81 mg by mouth once daily.      cetirizine 10 mg Cap Zyrtec   1 po q day      furosemide (LASIX) 40 MG tablet Take 1 tablet (40 mg total) by mouth 2 (two) times daily. 180 tablet 3    levothyroxine (SYNTHROID) 125 MCG tablet Take 1 tablet (125 mcg total) by mouth once daily. 30 tablet 11    MAGNESIUM ORAL Take by mouth once daily.      metoprolol succinate (TOPROL-XL) 50 MG 24 hr tablet TAKE 1 TABLET BY MOUTH 1 TIME A DAY (Patient taking differently: 100 mg once daily.) 90 tablet 3    mometasone (ELOCON) 0.1 % ointment aaa bid x 1-2 weeks then prn 45 g 0    potassium chloride SA (K-DUR,KLOR-CON) 20 MEQ tablet TAKE 1 TABLET BY MOUTH 1 TIME A DAY 90  tablet 3    rosuvastatin (CRESTOR) 10 MG tablet TAKE 1 TABLET BY MOUTH 1 TIME A DAY 90 tablet 3    ANTIOX #11/OM3/DHA/EPA/LUT/NAMAN (50+ ADULT EYE HEALTH ORAL) Take by mouth.       No current facility-administered medications on file prior to visit.     Review of patient's allergies indicates:   Allergen Reactions    No known drug allergies        Objective:     Physical Exam  Vitals and nursing note reviewed.   Constitutional:       Appearance: She is well-developed.   HENT:      Head: Normocephalic and atraumatic.   Eyes:      Conjunctiva/sclera: Conjunctivae normal.      Pupils: Pupils are equal, round, and reactive to light.   Cardiovascular:      Rate and Rhythm: Normal rate and regular rhythm.      Pulses: Intact distal pulses.      Heart sounds: Normal heart sounds.   Pulmonary:      Effort: Pulmonary effort is normal.      Breath sounds: Normal breath sounds.   Abdominal:      General: Bowel sounds are normal.      Palpations: Abdomen is soft.   Musculoskeletal:         General: Normal range of motion.      Cervical back: Normal range of motion and neck supple.   Skin:     General: Skin is warm and dry.   Neurological:      Mental Status: She is alert and oriented to person, place, and time.         Assessment:     1. Paroxysmal supraventricular tachycardia    2. Primary hypertension    3. ASD (atrial septal defect), ostium secundum    4. Pure hypercholesterolemia        Plan:     Paroxysmal supraventricular tachycardia    Primary hypertension    ASD (atrial septal defect), ostium secundum    Pure hypercholesterolemia      Continue metoprolol, lasix-htn/svt  Continue statin-hlp  Exercise    echo

## 2022-05-26 ENCOUNTER — PATIENT MESSAGE (OUTPATIENT)
Dept: FAMILY MEDICINE | Facility: CLINIC | Age: 67
End: 2022-05-26
Payer: COMMERCIAL

## 2022-05-30 ENCOUNTER — HOSPITAL ENCOUNTER (OUTPATIENT)
Dept: CARDIOLOGY | Facility: HOSPITAL | Age: 67
Discharge: HOME | End: 2022-05-30
Attending: INTERNAL MEDICINE
Payer: COMMERCIAL

## 2022-05-30 VITALS
HEIGHT: 62 IN | HEART RATE: 67 BPM | BODY MASS INDEX: 32.76 KG/M2 | SYSTOLIC BLOOD PRESSURE: 122 MMHG | DIASTOLIC BLOOD PRESSURE: 66 MMHG | WEIGHT: 178 LBS

## 2022-05-30 DIAGNOSIS — I47.10 PAROXYSMAL SUPRAVENTRICULAR TACHYCARDIA: ICD-10-CM

## 2022-05-30 LAB
AORTIC ROOT ANNULUS: 2.85 CM
ASCENDING AORTA: 2.73 CM
AV INDEX (PROSTH): 0.69
AV MEAN GRADIENT: 3 MMHG
AV PEAK GRADIENT: 5 MMHG
AV VALVE AREA: 2.36 CM2
AV VELOCITY RATIO: 0.67
BSA FOR ECHO PROCEDURE: 1.88 M2
CV ECHO LV RWT: 0.36 CM
DOP CALC AO PEAK VEL: 1.09 M/S
DOP CALC AO VTI: 24.4 CM
DOP CALC LVOT AREA: 3.4 CM2
DOP CALC LVOT DIAMETER: 2.09 CM
DOP CALC LVOT PEAK VEL: 0.73 M/S
DOP CALC LVOT STROKE VOLUME: 57.61 CM3
DOP CALC RVOT PEAK VEL: 1.33 M/S
DOP CALC RVOT VTI: 28.7 CM
DOP CALCLVOT PEAK VEL VTI: 16.8 CM
E WAVE DECELERATION TIME: 108.11 MSEC
E/A RATIO: 1.35
E/E' RATIO: 7.33 M/S
ECHO LV POSTERIOR WALL: 0.86 CM (ref 0.6–1.1)
EJECTION FRACTION: 65 %
FRACTIONAL SHORTENING: 33 % (ref 28–44)
HCV RNA # SERPL NAA+PROBE: 530.7 MMHG/S
INTERVENTRICULAR SEPTUM: 1.08 CM (ref 0.6–1.1)
IVRT: 85.63 MSEC
LA MAJOR: 5.37 CM
LA MINOR: 4.98 CM
LA WIDTH: 4 CM
LEFT ATRIUM SIZE: 4.08 CM
LEFT ATRIUM VOLUME INDEX MOD: 21.6 ML/M2
LEFT ATRIUM VOLUME INDEX: 39.4 ML/M2
LEFT ATRIUM VOLUME MOD: 39.36 CM3
LEFT ATRIUM VOLUME: 71.69 CM3
LEFT INTERNAL DIMENSION IN SYSTOLE: 3.15 CM (ref 2.1–4)
LEFT VENTRICLE DIASTOLIC VOLUME INDEX: 57.03 ML/M2
LEFT VENTRICLE DIASTOLIC VOLUME: 103.8 ML
LEFT VENTRICLE MASS INDEX: 88 G/M2
LEFT VENTRICLE SYSTOLIC VOLUME INDEX: 17.3 ML/M2
LEFT VENTRICLE SYSTOLIC VOLUME: 31.47 ML
LEFT VENTRICULAR INTERNAL DIMENSION IN DIASTOLE: 4.73 CM (ref 3.5–6)
LEFT VENTRICULAR MASS: 159.44 G
LV LATERAL E/E' RATIO: 5.92 M/S
LV SEPTAL E/E' RATIO: 9.63 M/S
LVOT MG: 1.16 MMHG
LVOT MV: 0.5 CM/S
MV PEAK A VEL: 0.57 M/S
MV PEAK E VEL: 0.77 M/S
MV STENOSIS PRESSURE HALF TIME: 31.35 MS
MV VALVE AREA P 1/2 METHOD: 7.02 CM2
PISA TR MAX VEL: 2.91 M/S
PULM VEIN S/D RATIO: 0.98
PV MEAN GRADIENT: 4.33 MMHG
PV PEAK D VEL: 0.43 M/S
PV PEAK GRADIENT: 7.06 MMHG
PV PEAK S VEL: 0.42 M/S
PV PEAK VELOCITY: 1.76 CM/S
RA MAJOR: 4.09 CM
RA PRESSURE: 3 MMHG
RA WIDTH: 3.58 CM
RIGHT VENTRICULAR END-DIASTOLIC DIMENSION: 2.72 CM
SINUS: 2.72 CM
STJ: 2.56 CM
TDI LATERAL: 0.13 M/S
TDI SEPTAL: 0.08 M/S
TDI: 0.11 M/S
TR MAX PG: 34 MMHG
TRICUSPID ANNULAR PLANE SYSTOLIC EXCURSION: 1.62 CM
TV REST PULMONARY ARTERY PRESSURE: 37 MMHG

## 2022-05-30 PROCEDURE — 93306 TTE W/DOPPLER COMPLETE: CPT | Mod: PO

## 2022-05-30 PROCEDURE — 93306 ECHO (CUPID ONLY): ICD-10-PCS | Mod: 26,,, | Performed by: INTERNAL MEDICINE

## 2022-05-30 PROCEDURE — 93306 TTE W/DOPPLER COMPLETE: CPT | Mod: 26,,, | Performed by: INTERNAL MEDICINE

## 2022-05-31 ENCOUNTER — TELEPHONE (OUTPATIENT)
Dept: CARDIOLOGY | Facility: CLINIC | Age: 67
End: 2022-05-31
Payer: COMMERCIAL

## 2022-05-31 NOTE — TELEPHONE ENCOUNTER
Called patient to discuss echo results. Pt verbalized understanding. All questions answered. Pt will call back with any other questions or concerns.     Please tell pt:   Echo is wnl limits

## 2022-06-20 ENCOUNTER — PATIENT MESSAGE (OUTPATIENT)
Dept: CARDIOLOGY | Facility: CLINIC | Age: 67
End: 2022-06-20
Payer: COMMERCIAL

## 2022-06-20 DIAGNOSIS — I10 ESSENTIAL HYPERTENSION: ICD-10-CM

## 2022-06-20 DIAGNOSIS — I47.10 PAROXYSMAL SUPRAVENTRICULAR TACHYCARDIA: ICD-10-CM

## 2022-06-20 NOTE — TELEPHONE ENCOUNTER
Changes have been made to the following medications and [facility].  I need 90 day scripts sent to My pharmacy ([facility]     Furosemide 40mg tablets.  2(am)  1(pm)  90 day supply. 270  tablets     Metoprolol SUCCESS ER 100mg per day  90 day supply         Thank You     [facility]

## 2022-06-21 RX ORDER — METOPROLOL SUCCINATE 100 MG/1
100 TABLET, EXTENDED RELEASE ORAL DAILY
Qty: 90 TABLET | Refills: 3 | Status: SHIPPED | OUTPATIENT
Start: 2022-06-21 | End: 2023-03-14

## 2022-06-21 RX ORDER — FUROSEMIDE 40 MG/1
40 TABLET ORAL 3 TIMES DAILY
Qty: 270 TABLET | Refills: 3 | Status: SHIPPED | OUTPATIENT
Start: 2022-06-21 | End: 2023-04-25

## 2022-07-08 ENCOUNTER — PATIENT MESSAGE (OUTPATIENT)
Dept: FAMILY MEDICINE | Facility: CLINIC | Age: 67
End: 2022-07-08
Payer: COMMERCIAL

## 2022-10-20 ENCOUNTER — PATIENT MESSAGE (OUTPATIENT)
Dept: ADMINISTRATIVE | Facility: HOSPITAL | Age: 67
End: 2022-10-20
Payer: COMMERCIAL

## 2022-10-20 DIAGNOSIS — Z12.31 ENCOUNTER FOR SCREENING MAMMOGRAM FOR MALIGNANT NEOPLASM OF BREAST: ICD-10-CM

## 2022-10-20 DIAGNOSIS — I10 PRIMARY HYPERTENSION: Primary | ICD-10-CM

## 2022-10-20 DIAGNOSIS — E05.00 TOXIC DIFFUSE GOITER: ICD-10-CM

## 2022-10-21 ENCOUNTER — PATIENT MESSAGE (OUTPATIENT)
Dept: ADMINISTRATIVE | Facility: HOSPITAL | Age: 67
End: 2022-10-21
Payer: COMMERCIAL

## 2022-10-24 NOTE — TELEPHONE ENCOUNTER
I have signed for the following orders AND/OR meds.  Please call the patient and ask the patient to schedule the testing AND/OR inform about any medications that were sent.      Orders Placed This Encounter   Procedures    Mammo Digital Screening Bilat w/ Keyon     Standing Status:   Future     Standing Expiration Date:   10/21/2024     Order Specific Question:   May the Radiologist modify the order per protocol to meet the clinical needs of the patient?     Answer:   Yes     Order Specific Question:   Release to patient     Answer:   Immediate    US Soft Tissue Head Neck Thyroid     Standing Status:   Future     Standing Expiration Date:   10/21/2023     Order Specific Question:   May the Radiologist modify the order per protocol to meet the clinical needs of the patient?     Answer:   Yes     Order Specific Question:   Release to patient     Answer:   Immediate    Comprehensive Metabolic Panel     Standing Status:   Future     Standing Expiration Date:   12/20/2023    Lipid panel     Standing Status:   Future     Standing Expiration Date:   12/20/2023

## 2022-11-10 ENCOUNTER — TELEPHONE (OUTPATIENT)
Dept: FAMILY MEDICINE | Facility: CLINIC | Age: 67
End: 2022-11-10

## 2022-11-10 ENCOUNTER — HOSPITAL ENCOUNTER (OUTPATIENT)
Dept: RADIOLOGY | Facility: HOSPITAL | Age: 67
Discharge: HOME | End: 2022-11-10
Attending: NURSE PRACTITIONER
Payer: COMMERCIAL

## 2022-11-10 ENCOUNTER — OFFICE VISIT (OUTPATIENT)
Dept: FAMILY MEDICINE | Facility: CLINIC | Age: 67
End: 2022-11-10
Payer: COMMERCIAL

## 2022-11-10 ENCOUNTER — HOSPITAL ENCOUNTER (OUTPATIENT)
Dept: RADIOLOGY | Facility: HOSPITAL | Age: 67
Discharge: HOME | End: 2022-11-10
Attending: FAMILY MEDICINE
Payer: COMMERCIAL

## 2022-11-10 VITALS
DIASTOLIC BLOOD PRESSURE: 73 MMHG | SYSTOLIC BLOOD PRESSURE: 119 MMHG | HEIGHT: 62 IN | HEART RATE: 67 BPM | TEMPERATURE: 99 F | BODY MASS INDEX: 33.39 KG/M2 | WEIGHT: 181.44 LBS

## 2022-11-10 VITALS — HEIGHT: 62 IN | WEIGHT: 181 LBS | BODY MASS INDEX: 33.31 KG/M2

## 2022-11-10 DIAGNOSIS — I47.10 PAROXYSMAL SUPRAVENTRICULAR TACHYCARDIA: ICD-10-CM

## 2022-11-10 DIAGNOSIS — E05.00 TOXIC DIFFUSE GOITER: ICD-10-CM

## 2022-11-10 DIAGNOSIS — Z12.31 ENCOUNTER FOR SCREENING MAMMOGRAM FOR MALIGNANT NEOPLASM OF BREAST: ICD-10-CM

## 2022-11-10 DIAGNOSIS — Z00.00 ANNUAL PHYSICAL EXAM: Primary | ICD-10-CM

## 2022-11-10 DIAGNOSIS — I10 ESSENTIAL HYPERTENSION: ICD-10-CM

## 2022-11-10 DIAGNOSIS — K76.0 FATTY LIVER: ICD-10-CM

## 2022-11-10 DIAGNOSIS — Z23 NEED FOR PROPHYLACTIC VACCINATION AND INOCULATION AGAINST INFLUENZA: ICD-10-CM

## 2022-11-10 DIAGNOSIS — E78.00 PURE HYPERCHOLESTEROLEMIA: ICD-10-CM

## 2022-11-10 DIAGNOSIS — E04.9 GOITER: ICD-10-CM

## 2022-11-10 DIAGNOSIS — Z86.010 HISTORY OF COLON POLYPS: ICD-10-CM

## 2022-11-10 DIAGNOSIS — Q21.11 ASD (ATRIAL SEPTAL DEFECT), OSTIUM SECUNDUM: ICD-10-CM

## 2022-11-10 DIAGNOSIS — E03.9 HYPOTHYROIDISM, UNSPECIFIED TYPE: ICD-10-CM

## 2022-11-10 DIAGNOSIS — Z76.0 MEDICATION REFILL: ICD-10-CM

## 2022-11-10 DIAGNOSIS — L82.1 SEBORRHEIC KERATOSIS: ICD-10-CM

## 2022-11-10 PROCEDURE — 77067 SCR MAMMO BI INCL CAD: CPT | Mod: TC,PO

## 2022-11-10 PROCEDURE — 3008F PR BODY MASS INDEX (BMI) DOCUMENTED: ICD-10-PCS | Mod: CPTII,S$GLB,, | Performed by: NURSE PRACTITIONER

## 2022-11-10 PROCEDURE — 3078F DIAST BP <80 MM HG: CPT | Mod: CPTII,S$GLB,, | Performed by: NURSE PRACTITIONER

## 2022-11-10 PROCEDURE — 77063 BREAST TOMOSYNTHESIS BI: CPT | Mod: TC,PO

## 2022-11-10 PROCEDURE — 99999 PR PBB SHADOW E&M-EST. PATIENT-LVL V: ICD-10-PCS | Mod: PBBFAC,,, | Performed by: NURSE PRACTITIONER

## 2022-11-10 PROCEDURE — 76705 ECHO EXAM OF ABDOMEN: CPT | Mod: 26,,, | Performed by: RADIOLOGY

## 2022-11-10 PROCEDURE — 90471 IMMUNIZATION ADMIN: CPT | Mod: S$GLB,,, | Performed by: NURSE PRACTITIONER

## 2022-11-10 PROCEDURE — 90694 FLU VACCINE - QUADRIVALENT - ADJUVANTED: ICD-10-PCS | Mod: S$GLB,,, | Performed by: NURSE PRACTITIONER

## 2022-11-10 PROCEDURE — 90471 FLU VACCINE - QUADRIVALENT - ADJUVANTED: ICD-10-PCS | Mod: S$GLB,,, | Performed by: NURSE PRACTITIONER

## 2022-11-10 PROCEDURE — 76705 US ABDOMEN LIMITED: ICD-10-PCS | Mod: 26,,, | Performed by: RADIOLOGY

## 2022-11-10 PROCEDURE — 76536 US EXAM OF HEAD AND NECK: CPT | Mod: TC,PO

## 2022-11-10 PROCEDURE — 1126F PR PAIN SEVERITY QUANTIFIED, NO PAIN PRESENT: ICD-10-PCS | Mod: CPTII,S$GLB,, | Performed by: NURSE PRACTITIONER

## 2022-11-10 PROCEDURE — 77067 SCR MAMMO BI INCL CAD: CPT | Mod: 26,,, | Performed by: RADIOLOGY

## 2022-11-10 PROCEDURE — 76536 US EXAM OF HEAD AND NECK: CPT | Mod: 26,,, | Performed by: RADIOLOGY

## 2022-11-10 PROCEDURE — 76705 ECHO EXAM OF ABDOMEN: CPT | Mod: TC,PO

## 2022-11-10 PROCEDURE — 99999 PR PBB SHADOW E&M-EST. PATIENT-LVL V: CPT | Mod: PBBFAC,,, | Performed by: NURSE PRACTITIONER

## 2022-11-10 PROCEDURE — 90694 VACC AIIV4 NO PRSRV 0.5ML IM: CPT | Mod: S$GLB,,, | Performed by: NURSE PRACTITIONER

## 2022-11-10 PROCEDURE — 76536 US SOFT TISSUE HEAD NECK THYROID: ICD-10-PCS | Mod: 26,,, | Performed by: RADIOLOGY

## 2022-11-10 PROCEDURE — 77063 MAMMO DIGITAL SCREENING BILAT WITH TOMO: ICD-10-PCS | Mod: 26,,, | Performed by: RADIOLOGY

## 2022-11-10 PROCEDURE — 1101F PR PT FALLS ASSESS DOC 0-1 FALLS W/OUT INJ PAST YR: ICD-10-PCS | Mod: CPTII,S$GLB,, | Performed by: NURSE PRACTITIONER

## 2022-11-10 PROCEDURE — 3078F PR MOST RECENT DIASTOLIC BLOOD PRESSURE < 80 MM HG: ICD-10-PCS | Mod: CPTII,S$GLB,, | Performed by: NURSE PRACTITIONER

## 2022-11-10 PROCEDURE — 3074F SYST BP LT 130 MM HG: CPT | Mod: CPTII,S$GLB,, | Performed by: NURSE PRACTITIONER

## 2022-11-10 PROCEDURE — 99397 PER PM REEVAL EST PAT 65+ YR: CPT | Mod: 25,S$GLB,, | Performed by: NURSE PRACTITIONER

## 2022-11-10 PROCEDURE — 1159F MED LIST DOCD IN RCRD: CPT | Mod: CPTII,S$GLB,, | Performed by: NURSE PRACTITIONER

## 2022-11-10 PROCEDURE — 3288F FALL RISK ASSESSMENT DOCD: CPT | Mod: CPTII,S$GLB,, | Performed by: NURSE PRACTITIONER

## 2022-11-10 PROCEDURE — 1160F RVW MEDS BY RX/DR IN RCRD: CPT | Mod: CPTII,S$GLB,, | Performed by: NURSE PRACTITIONER

## 2022-11-10 PROCEDURE — 99397 PR PREVENTIVE VISIT,EST,65 & OVER: ICD-10-PCS | Mod: 25,S$GLB,, | Performed by: NURSE PRACTITIONER

## 2022-11-10 PROCEDURE — 77067 MAMMO DIGITAL SCREENING BILAT WITH TOMO: ICD-10-PCS | Mod: 26,,, | Performed by: RADIOLOGY

## 2022-11-10 PROCEDURE — 3074F PR MOST RECENT SYSTOLIC BLOOD PRESSURE < 130 MM HG: ICD-10-PCS | Mod: CPTII,S$GLB,, | Performed by: NURSE PRACTITIONER

## 2022-11-10 PROCEDURE — 1126F AMNT PAIN NOTED NONE PRSNT: CPT | Mod: CPTII,S$GLB,, | Performed by: NURSE PRACTITIONER

## 2022-11-10 PROCEDURE — 3288F PR FALLS RISK ASSESSMENT DOCUMENTED: ICD-10-PCS | Mod: CPTII,S$GLB,, | Performed by: NURSE PRACTITIONER

## 2022-11-10 PROCEDURE — 1101F PT FALLS ASSESS-DOCD LE1/YR: CPT | Mod: CPTII,S$GLB,, | Performed by: NURSE PRACTITIONER

## 2022-11-10 PROCEDURE — 1160F PR REVIEW ALL MEDS BY PRESCRIBER/CLIN PHARMACIST DOCUMENTED: ICD-10-PCS | Mod: CPTII,S$GLB,, | Performed by: NURSE PRACTITIONER

## 2022-11-10 PROCEDURE — 1159F PR MEDICATION LIST DOCUMENTED IN MEDICAL RECORD: ICD-10-PCS | Mod: CPTII,S$GLB,, | Performed by: NURSE PRACTITIONER

## 2022-11-10 PROCEDURE — 77063 BREAST TOMOSYNTHESIS BI: CPT | Mod: 26,,, | Performed by: RADIOLOGY

## 2022-11-10 PROCEDURE — 3008F BODY MASS INDEX DOCD: CPT | Mod: CPTII,S$GLB,, | Performed by: NURSE PRACTITIONER

## 2022-11-10 RX ORDER — LEVOTHYROXINE SODIUM 125 UG/1
125 TABLET ORAL DAILY
Qty: 90 TABLET | Refills: 3 | Status: SHIPPED | OUTPATIENT
Start: 2022-11-10 | End: 2022-12-20

## 2022-11-10 RX ORDER — POTASSIUM CHLORIDE 20 MEQ/1
20 TABLET, EXTENDED RELEASE ORAL DAILY
Qty: 90 TABLET | Refills: 3 | Status: SHIPPED | OUTPATIENT
Start: 2022-11-10 | End: 2023-12-06 | Stop reason: SDUPTHER

## 2022-11-10 RX ORDER — ROSUVASTATIN CALCIUM 10 MG/1
10 TABLET, COATED ORAL DAILY
Qty: 90 TABLET | Refills: 3 | Status: SHIPPED | OUTPATIENT
Start: 2022-11-10 | End: 2023-12-06 | Stop reason: SDUPTHER

## 2022-11-10 NOTE — TELEPHONE ENCOUNTER
I ordered a flu shot on this pt on today, however I had to defer the order as it the shot information was not entered by the nurse. Was it not given or was the information just not entered?

## 2022-11-10 NOTE — PROGRESS NOTES
Subjective:       Patient ID: Mallorychiara David is a 67 y.o. female.    Chief Complaint: Annual Exam  Pt in today for annual exam. HTN, PSVT, ASD managed by cardiology; sees q6m. Hyperlipidemia, hypothyroid, goiter managed with medication. Fatty liver noted on US abd in 2015. Pt sees dermatology for seborrheic keratosis management. Labs, influenza vaccine due; US thyroid done today. Pt has no other complaints today.     Past Medical History:   Diagnosis Date    *Atrial flutter     s/p ablation    Abnormal Pap smear of cervix     ASD (atrial septal defect), ostium secundum     repaired    Benign hypertensive heart disease without heart failure     Endometriosis     Fatty liver     Hyperlipidemia     Hypertension     2009    Nodular goiter (nontoxic)     1999    Special screening for malignant neoplasms, colon 10/28/2014     Social History     Socioeconomic History    Marital status:      Spouse name: Alexi    Number of children: 2   Occupational History    Occupation:      Employer: [employer]   Tobacco Use    Smoking status: Never    Smokeless tobacco: Never   Substance and Sexual Activity    Alcohol use: Yes     Comment: occasional glass of red wine    Drug use: No    Sexual activity: Not Currently     Partners: Male     Birth control/protection: Post-menopausal     Social Determinants of Health     Financial Resource Strain: Low Risk     Difficulty of Paying Living Expenses: Not hard at all   Food Insecurity: No Food Insecurity    Worried About Running Out of Food in the Last Year: Never true    Ran Out of Food in the Last Year: Never true   Transportation Needs: No Transportation Needs    Lack of Transportation (Medical): No    Lack of Transportation (Non-Medical): No   Physical Activity: Insufficiently Active    Days of Exercise per Week: 3 days    Minutes of Exercise per Session: 40 min   Stress: No Stress Concern Present    Feeling of Stress : Only a little   Social Connections: Unknown     Frequency of Communication with Friends and Family: More than three times a week    Frequency of Social Gatherings with Friends and Family: Once a week    Active Member of Clubs or Organizations: Yes    Attends Club or Organization Meetings: 1 to 4 times per year    Marital Status:    Housing Stability: Unknown    Unable to Pay for Housing in the Last Year: No    Unstable Housing in the Last Year: No     Past Surgical History:   Procedure Laterality Date    ABLATION OF DYSRHYTHMIC FOCUS      ASD REPAIR, SECUNDUM      CARPAL TUNNEL RELEASE  2007    right hand    COLONOSCOPY N/A 1/14/2020    Procedure: COLONOSCOPY;  Surgeon: Latrell Banks MD;  Location: [facility];  Service: Endoscopy;  Laterality: N/A;    EYE SURGERY  11/2019 12/2019 right eye     Heart ablation  2011    OOPHORECTOMY      Open Heart Surgery  1970    OVARIAN CYST SURGERY  1981       HPI  Review of Systems   Constitutional: Negative.    HENT: Negative.     Eyes: Negative.    Respiratory: Negative.     Cardiovascular: Negative.    Gastrointestinal: Negative.    Endocrine: Negative.    Genitourinary: Negative.    Musculoskeletal: Negative.    Integumentary:  Negative.   Allergic/Immunologic: Negative.    Neurological: Negative.    Psychiatric/Behavioral: Negative.         Objective:      Physical Exam  Vitals and nursing note reviewed.   Constitutional:       Appearance: Normal appearance.   HENT:      Head: Normocephalic.      Right Ear: Tympanic membrane, ear canal and external ear normal.      Left Ear: Tympanic membrane, ear canal and external ear normal.      Nose: Nose normal.      Mouth/Throat:      Mouth: Mucous membranes are moist.      Pharynx: Oropharynx is clear.   Eyes:      Conjunctiva/sclera: Conjunctivae normal.      Pupils: Pupils are equal, round, and reactive to light.   Cardiovascular:      Rate and Rhythm: Normal rate and regular rhythm.      Pulses: Normal pulses.      Heart sounds: Normal heart sounds.   Pulmonary:       Effort: Pulmonary effort is normal.      Breath sounds: Normal breath sounds.   Abdominal:      General: Bowel sounds are normal.      Palpations: Abdomen is soft.   Musculoskeletal:         General: Normal range of motion.      Cervical back: Normal range of motion and neck supple.   Skin:     General: Skin is warm and dry.      Capillary Refill: Capillary refill takes 2 to 3 seconds.   Neurological:      Mental Status: She is alert and oriented to person, place, and time.   Psychiatric:         Mood and Affect: Mood normal.         Behavior: Behavior normal.         Thought Content: Thought content normal.         Judgment: Judgment normal.       Assessment:       Problem List Items Addressed This Visit       Paroxysmal supraventricular tachycardia    Hypertension    Goiter    ASD (atrial septal defect), ostium secundum    Hyperlipidemia    History of colon polyps    Fatty liver     Other Visit Diagnoses       Annual physical exam    -  Primary    Relevant Orders    CBC Without Differential    Hypothyroidism, unspecified type        Seborrheic keratosis        Need for prophylactic vaccination and inoculation against influenza        Relevant Orders    Flu Vaccine - Quadrivalent (Adjuvanted) *Preferred* 65+    Medication refill                  Plan:           Mallory was seen today for annual exam.    Diagnoses and all orders for this visit:    Annual physical exam  Essential hypertension  Pure hypercholesterolemia  Hypothyroidism, unspecified type  Paroxysmal supraventricular tachycardia  ASD (atrial septal defect), ostium secundum  Goiter  Fatty liver  Seborrheic keratosis  History of colon polyps  Need for prophylactic vaccination and inoculation against influenza  -     CBC Without Differential; Future        CMP, lipid ordered prior to visit        TSH UTD  -     US Abdomen Limited; Future  -     Flu Vaccine - Quadrivalent (Adjuvanted) *Preferred* 65+  Continue current medications, plan of care  F/U with  specialists as advised  RTC as needed    Medication refill        -     levothyroxine (SYNTHROID) 125 MCG tablet; Take 1 tablet (125 mcg total) by mouth once daily.  -     potassium chloride SA (K-DUR,KLOR-CON) 20 MEQ tablet; Take 1 tablet (20 mEq total) by mouth once daily.  -     rosuvastatin (CRESTOR) 10 MG tablet; Take 1 tablet (10 mg total) by mouth once daily.

## 2022-11-11 ENCOUNTER — TELEPHONE (OUTPATIENT)
Dept: FAMILY MEDICINE | Facility: CLINIC | Age: 67
End: 2022-11-11
Payer: COMMERCIAL

## 2022-11-11 DIAGNOSIS — R19.00 INTRA-ABDOMINAL AND PELVIC SWELLING, MASS AND LUMP, UNSPECIFIED SITE: Primary | ICD-10-CM

## 2022-11-14 ENCOUNTER — PATIENT MESSAGE (OUTPATIENT)
Dept: FAMILY MEDICINE | Facility: CLINIC | Age: 67
End: 2022-11-14
Payer: COMMERCIAL

## 2022-11-14 NOTE — TELEPHONE ENCOUNTER
Please arrange her pneumonia vaccine and send this chart to Lorena to update the COVID vaccinations that she sent a copy of.      She needs a COVID vaccination at her pharmacy.

## 2022-11-15 ENCOUNTER — TELEPHONE (OUTPATIENT)
Dept: FAMILY MEDICINE | Facility: CLINIC | Age: 67
End: 2022-11-15
Payer: COMMERCIAL

## 2022-11-15 DIAGNOSIS — R19.00 INTRA-ABDOMINAL AND PELVIC SWELLING, MASS AND LUMP, UNSPECIFIED SITE: Primary | ICD-10-CM

## 2022-11-22 ENCOUNTER — OFFICE VISIT (OUTPATIENT)
Dept: DERMATOLOGY | Facility: CLINIC | Age: 67
End: 2022-11-22
Payer: COMMERCIAL

## 2022-11-22 VITALS — WEIGHT: 181 LBS | BODY MASS INDEX: 33.31 KG/M2 | RESPIRATION RATE: 18 BRPM | HEIGHT: 62 IN

## 2022-11-22 DIAGNOSIS — Z12.83 SKIN CANCER SCREENING: ICD-10-CM

## 2022-11-22 DIAGNOSIS — D22.9 MULTIPLE BENIGN NEVI: ICD-10-CM

## 2022-11-22 DIAGNOSIS — L82.1 SEBORRHEIC KERATOSES: ICD-10-CM

## 2022-11-22 DIAGNOSIS — L57.0 ACTINIC KERATOSIS: Primary | ICD-10-CM

## 2022-11-22 PROCEDURE — 1101F PT FALLS ASSESS-DOCD LE1/YR: CPT | Mod: CPTII,S$GLB,, | Performed by: DERMATOLOGY

## 2022-11-22 PROCEDURE — 99213 PR OFFICE/OUTPT VISIT, EST, LEVL III, 20-29 MIN: ICD-10-PCS | Mod: 25,S$GLB,, | Performed by: DERMATOLOGY

## 2022-11-22 PROCEDURE — 1126F AMNT PAIN NOTED NONE PRSNT: CPT | Mod: CPTII,S$GLB,, | Performed by: DERMATOLOGY

## 2022-11-22 PROCEDURE — 3008F PR BODY MASS INDEX (BMI) DOCUMENTED: ICD-10-PCS | Mod: CPTII,S$GLB,, | Performed by: DERMATOLOGY

## 2022-11-22 PROCEDURE — 3288F PR FALLS RISK ASSESSMENT DOCUMENTED: ICD-10-PCS | Mod: CPTII,S$GLB,, | Performed by: DERMATOLOGY

## 2022-11-22 PROCEDURE — 17000 PR DESTRUCTION(LASER SURGERY,CRYOSURGERY,CHEMOSURGERY),PREMALIGNANT LESIONS,FIRST LESION: ICD-10-PCS | Mod: S$GLB,,, | Performed by: DERMATOLOGY

## 2022-11-22 PROCEDURE — 1159F PR MEDICATION LIST DOCUMENTED IN MEDICAL RECORD: ICD-10-PCS | Mod: CPTII,S$GLB,, | Performed by: DERMATOLOGY

## 2022-11-22 PROCEDURE — 1101F PR PT FALLS ASSESS DOC 0-1 FALLS W/OUT INJ PAST YR: ICD-10-PCS | Mod: CPTII,S$GLB,, | Performed by: DERMATOLOGY

## 2022-11-22 PROCEDURE — 17000 DESTRUCT PREMALG LESION: CPT | Mod: S$GLB,,, | Performed by: DERMATOLOGY

## 2022-11-22 PROCEDURE — 3008F BODY MASS INDEX DOCD: CPT | Mod: CPTII,S$GLB,, | Performed by: DERMATOLOGY

## 2022-11-22 PROCEDURE — 1160F PR REVIEW ALL MEDS BY PRESCRIBER/CLIN PHARMACIST DOCUMENTED: ICD-10-PCS | Mod: CPTII,S$GLB,, | Performed by: DERMATOLOGY

## 2022-11-22 PROCEDURE — 1126F PR PAIN SEVERITY QUANTIFIED, NO PAIN PRESENT: ICD-10-PCS | Mod: CPTII,S$GLB,, | Performed by: DERMATOLOGY

## 2022-11-22 PROCEDURE — 1160F RVW MEDS BY RX/DR IN RCRD: CPT | Mod: CPTII,S$GLB,, | Performed by: DERMATOLOGY

## 2022-11-22 PROCEDURE — 1159F MED LIST DOCD IN RCRD: CPT | Mod: CPTII,S$GLB,, | Performed by: DERMATOLOGY

## 2022-11-22 PROCEDURE — 3288F FALL RISK ASSESSMENT DOCD: CPT | Mod: CPTII,S$GLB,, | Performed by: DERMATOLOGY

## 2022-11-22 PROCEDURE — 99999 PR PBB SHADOW E&M-EST. PATIENT-LVL III: CPT | Mod: PBBFAC,,, | Performed by: DERMATOLOGY

## 2022-11-22 PROCEDURE — 99999 PR PBB SHADOW E&M-EST. PATIENT-LVL III: ICD-10-PCS | Mod: PBBFAC,,, | Performed by: DERMATOLOGY

## 2022-11-22 PROCEDURE — 99213 OFFICE O/P EST LOW 20 MIN: CPT | Mod: 25,S$GLB,, | Performed by: DERMATOLOGY

## 2022-11-22 NOTE — PATIENT INSTRUCTIONS

## 2022-11-22 NOTE — PROGRESS NOTES
Subjective:       Patient ID:  Mallorychiara David is a 67 y.o. female who presents for   Chief Complaint   Patient presents with    Skin Check     HPI    Patient present for FBSC. LOV 5/17/2022 by Stefanie Sanchez MD.    C/o lesion to R cheek x months. No changes not treating.    no Phx of NMSC.  Fhx of NMSC  + Mother and Father hx of BCC  no Fhx of melanoma    Past Medical History:   Diagnosis Date    *Atrial flutter     s/p ablation    Abnormal Pap smear of cervix     ASD (atrial septal defect), ostium secundum     repaired    Benign hypertensive heart disease without heart failure     Endometriosis     Fatty liver     Hyperlipidemia     Hypertension     2009    Nodular goiter (nontoxic)     1999    Special screening for malignant neoplasms, colon 10/28/2014         Review of Systems   Constitutional:  Negative for fever, chills and fatigue.   Skin:  Positive for daily sunscreen use, activity-related sunscreen use and wears hat.   Hematologic/Lymphatic: Does not bruise/bleed easily.      Objective:    Physical Exam   Constitutional: She appears well-developed and well-nourished. No distress.   HENT:   Mouth/Throat: Lips normal.    Eyes: Lids are normal.  No conjunctival no injection.   Cardiovascular:  There is no local extremity swelling and no dependent edema.             Neurological: She is alert and oriented to person, place, and time. She is not disoriented.   Psychiatric: She has a normal mood and affect.   Skin:   Areas Examined (abnormalities noted in diagram):   Scalp / Hair Palpated and Inspected  Head / Face Inspection Performed  Neck Inspection Performed  Chest / Axilla Inspection Performed  Abdomen Inspection Performed  Genitals / Buttocks / Groin Inspection Performed  Back Inspection Performed  RUE Inspected  LUE Inspection Performed  RLE Inspected  LLE Inspection Performed  Nails and Digits Inspection Performed                 Diagram Legend     Erythematous scaling macule/papule c/w actinic  keratosis       Vascular papule c/w angioma      Pigmented verrucoid papule/plaque c/w seborrheic keratosis      Yellow umbilicated papule c/w sebaceous hyperplasia      Irregularly shaped tan macule c/w lentigo     1-2 mm smooth white papules consistent with Milia      Movable subcutaneous cyst with punctum c/w epidermal inclusion cyst      Subcutaneous movable cyst c/w pilar cyst      Firm pink to brown papule c/w dermatofibroma      Pedunculated fleshy papule(s) c/w skin tag(s)      Evenly pigmented macule c/w junctional nevus     Mildly variegated pigmented, slightly irregular-bordered macule c/w mildly atypical nevus      Flesh colored to evenly pigmented papule c/w intradermal nevus       Pink pearly papule/plaque c/w basal cell carcinoma      Erythematous hyperkeratotic cursted plaque c/w SCC      Surgical scar with no sign of skin cancer recurrence      Open and closed comedones      Inflammatory papules and pustules      Verrucoid papule consistent consistent with wart     Erythematous eczematous patches and plaques     Dystrophic onycholytic nail with subungual debris c/w onychomycosis     Umbilicated papule    Erythematous-base heme-crusted tan verrucoid plaque consistent with inflamed seborrheic keratosis     Erythematous Silvery Scaling Plaque c/w Psoriasis     See annotation      Assessment / Plan:        Actinic keratosis  Cryosurgery Procedure Note    Verbal consent from the patient is obtained and the patient is aware of the precancerous quality and need for treatment of these lesions. Liquid nitrogen cryosurgery is applied to the 1 actinic keratoses, as detailed in the physical exam, to produce a freeze injury. The patient is aware that blisters may form and is instructed on wound care with gentle cleansing and use of vaseline ointment to keep moist until healed. The patient is supplied a handout on cryosurgery and is instructed to call if lesions do not completely resolve. Discussed risk  postinflammatory pigmentary changes.       Skin cancer screening.    Total body skin examination performed today including at least 12 points as noted in physical examination. No lesions suspicious for malignancy noted.      Multiple benign nevi  Discussed ABCDE's of nevi.  Monitor for new mole or moles that are becoming bigger, darker, irritated, or developing irregular borders.       Seborrheic keratoses  These are benign inherited growths without a malignant potential. Reassurance given to patient. No treatment is necessary.              No follow-ups on file.

## 2022-11-23 ENCOUNTER — OFFICE VISIT (OUTPATIENT)
Dept: CARDIOLOGY | Facility: CLINIC | Age: 67
End: 2022-11-23
Payer: COMMERCIAL

## 2022-11-23 ENCOUNTER — TELEPHONE (OUTPATIENT)
Dept: FAMILY MEDICINE | Facility: CLINIC | Age: 67
End: 2022-11-23
Payer: COMMERCIAL

## 2022-11-23 VITALS
DIASTOLIC BLOOD PRESSURE: 78 MMHG | SYSTOLIC BLOOD PRESSURE: 130 MMHG | BODY MASS INDEX: 33.31 KG/M2 | HEIGHT: 62 IN | WEIGHT: 181 LBS | HEART RATE: 64 BPM

## 2022-11-23 DIAGNOSIS — I47.10 PAROXYSMAL SUPRAVENTRICULAR TACHYCARDIA: ICD-10-CM

## 2022-11-23 DIAGNOSIS — Q21.11 OSTIUM SECUNDUM TYPE ATRIAL SEPTAL DEFECT: Primary | ICD-10-CM

## 2022-11-23 DIAGNOSIS — E78.00 PURE HYPERCHOLESTEROLEMIA: ICD-10-CM

## 2022-11-23 DIAGNOSIS — I10 HYPERTENSION, UNSPECIFIED TYPE: ICD-10-CM

## 2022-11-23 PROCEDURE — 99214 PR OFFICE/OUTPT VISIT, EST, LEVL IV, 30-39 MIN: ICD-10-PCS | Mod: S$GLB,,, | Performed by: INTERNAL MEDICINE

## 2022-11-23 PROCEDURE — 3288F FALL RISK ASSESSMENT DOCD: CPT | Mod: CPTII,S$GLB,, | Performed by: INTERNAL MEDICINE

## 2022-11-23 PROCEDURE — 1160F RVW MEDS BY RX/DR IN RCRD: CPT | Mod: CPTII,S$GLB,, | Performed by: INTERNAL MEDICINE

## 2022-11-23 PROCEDURE — 1159F MED LIST DOCD IN RCRD: CPT | Mod: CPTII,S$GLB,, | Performed by: INTERNAL MEDICINE

## 2022-11-23 PROCEDURE — 1160F PR REVIEW ALL MEDS BY PRESCRIBER/CLIN PHARMACIST DOCUMENTED: ICD-10-PCS | Mod: CPTII,S$GLB,, | Performed by: INTERNAL MEDICINE

## 2022-11-23 PROCEDURE — 3075F SYST BP GE 130 - 139MM HG: CPT | Mod: CPTII,S$GLB,, | Performed by: INTERNAL MEDICINE

## 2022-11-23 PROCEDURE — 1100F PR PT FALLS ASSESS DOC 2+ FALLS/FALL W/INJURY/YR: ICD-10-PCS | Mod: CPTII,S$GLB,, | Performed by: INTERNAL MEDICINE

## 2022-11-23 PROCEDURE — 99999 PR PBB SHADOW E&M-EST. PATIENT-LVL III: ICD-10-PCS | Mod: PBBFAC,,, | Performed by: INTERNAL MEDICINE

## 2022-11-23 PROCEDURE — 3288F PR FALLS RISK ASSESSMENT DOCUMENTED: ICD-10-PCS | Mod: CPTII,S$GLB,, | Performed by: INTERNAL MEDICINE

## 2022-11-23 PROCEDURE — 99214 OFFICE O/P EST MOD 30 MIN: CPT | Mod: S$GLB,,, | Performed by: INTERNAL MEDICINE

## 2022-11-23 PROCEDURE — 99999 PR PBB SHADOW E&M-EST. PATIENT-LVL III: CPT | Mod: PBBFAC,,, | Performed by: INTERNAL MEDICINE

## 2022-11-23 PROCEDURE — 3075F PR MOST RECENT SYSTOLIC BLOOD PRESS GE 130-139MM HG: ICD-10-PCS | Mod: CPTII,S$GLB,, | Performed by: INTERNAL MEDICINE

## 2022-11-23 PROCEDURE — 3078F PR MOST RECENT DIASTOLIC BLOOD PRESSURE < 80 MM HG: ICD-10-PCS | Mod: CPTII,S$GLB,, | Performed by: INTERNAL MEDICINE

## 2022-11-23 PROCEDURE — 3078F DIAST BP <80 MM HG: CPT | Mod: CPTII,S$GLB,, | Performed by: INTERNAL MEDICINE

## 2022-11-23 PROCEDURE — 3008F PR BODY MASS INDEX (BMI) DOCUMENTED: ICD-10-PCS | Mod: CPTII,S$GLB,, | Performed by: INTERNAL MEDICINE

## 2022-11-23 PROCEDURE — 1159F PR MEDICATION LIST DOCUMENTED IN MEDICAL RECORD: ICD-10-PCS | Mod: CPTII,S$GLB,, | Performed by: INTERNAL MEDICINE

## 2022-11-23 PROCEDURE — 3008F BODY MASS INDEX DOCD: CPT | Mod: CPTII,S$GLB,, | Performed by: INTERNAL MEDICINE

## 2022-11-23 PROCEDURE — 1100F PTFALLS ASSESS-DOCD GE2>/YR: CPT | Mod: CPTII,S$GLB,, | Performed by: INTERNAL MEDICINE

## 2022-11-23 NOTE — TELEPHONE ENCOUNTER
----- Message from Abbey Leos sent at 11/23/2022  8:47 AM CST -----  Contact: Mallory  Patient is calling to speak with the nurse regarding scheduling Pneumonia shot. Please give patient a call back at [phone] as requested.  Thanks  LR

## 2022-11-23 NOTE — PROGRESS NOTES
Subjective:   Patient ID:  Malloryleslee David is a 67 y.o. female who presents for follow-up of Follow-up  Echo normal  Patient denies CP, angina or anginal equivalent or palpitations.  Hypertension  This is a chronic problem. The current episode started more than 1 year ago. The problem has been gradually improving since onset. The problem is controlled. Pertinent negatives include no chest pain, palpitations or shortness of breath. Past treatments include beta blockers and diuretics. The current treatment provides moderate improvement. There are no compliance problems.    Hyperlipidemia  This is a chronic problem. The current episode started more than 1 year ago. The problem is controlled. Pertinent negatives include no chest pain or shortness of breath. Current antihyperlipidemic treatment includes statins. The current treatment provides moderate improvement of lipids. There are no compliance problems.    Edema  This is a chronic problem. The current episode started more than 1 year ago. The problem occurs rarely. The problem has been gradually improving. Pertinent negatives include no chest pain. Nothing aggravates the symptoms. She has tried rest for the symptoms.   Dizziness:   Chronicity:  New  Onset:  1 to 4 weeks ago  Progression since onset:  Waxing and waning  Severity:  Mild  Duration:  Very brief  Dizziness characteristics:  Lightheaded/impending faintno palpitations and no chest pain.  Aggravated by:  Nothing  Treatments tried:  Rest  Improvements on treatment:  Moderate    Review of Systems   Constitutional: Negative. Negative for weight gain.   HENT: Negative.     Eyes: Negative.    Cardiovascular: Negative.  Negative for chest pain, leg swelling and palpitations.   Respiratory: Negative.  Negative for shortness of breath.    Endocrine: Negative.    Hematologic/Lymphatic: Negative.    Skin: Negative.    Musculoskeletal:  Negative for muscle weakness.   Gastrointestinal: Negative.    Genitourinary:  Negative.    Neurological: Negative.  Negative for dizziness.   Psychiatric/Behavioral: Negative.     Allergic/Immunologic: Negative.    All other systems reviewed and are negative.  Family History   Problem Relation Age of Onset    Hypertension Mother     Arthritis Mother     Hearing loss Mother         cholesteatoma surgery    Cancer Father         colon    Diabetes Father     Breast cancer Sister     Cancer Sister         Breast    Aneurysm Brother         this was of his heart.    Birth defects Brother         Heart murmer    Heart disease Brother     Hypertension Brother     Cancer Brother         Prostate Cancer    Diabetes Brother     Diabetes Brother     Hypertension Brother     Diabetes Sister     Heart disease Sister     Hypertension Sister     Stroke Sister     Melanoma Neg Hx      Past Medical History:   Diagnosis Date    *Atrial flutter     s/p ablation    Abnormal Pap smear of cervix     ASD (atrial septal defect), ostium secundum     repaired    Benign hypertensive heart disease without heart failure     Endometriosis     Fatty liver     Hyperlipidemia     Hypertension     2009    Nodular goiter (nontoxic)     1999    Special screening for malignant neoplasms, colon 10/28/2014     Social History     Socioeconomic History    Marital status:      Spouse name: Alexi    Number of children: 2   Occupational History    Occupation:      Employer: [employer]   Tobacco Use    Smoking status: Never    Smokeless tobacco: Never   Substance and Sexual Activity    Alcohol use: Yes     Comment: occasional glass of red wine    Drug use: No    Sexual activity: Not Currently     Partners: Male     Birth control/protection: Post-menopausal     Social Determinants of Health     Financial Resource Strain: Low Risk     Difficulty of Paying Living Expenses: Not hard at all   Food Insecurity: No Food Insecurity    Worried About Running Out of Food in the Last  Year: Never true    Ran Out of Food in the Last Year: Never true   Transportation Needs: No Transportation Needs    Lack of Transportation (Medical): No    Lack of Transportation (Non-Medical): No   Physical Activity: Insufficiently Active    Days of Exercise per Week: 3 days    Minutes of Exercise per Session: 40 min   Stress: No Stress Concern Present    Feeling of Stress : Only a little   Social Connections: Unknown    Frequency of Communication with Friends and Family: More than three times a week    Frequency of Social Gatherings with Friends and Family: Once a week    Active Member of Clubs or Organizations: Yes    Attends Club or Organization Meetings: 1 to 4 times per year    Marital Status:    Housing Stability: Unknown    Unable to Pay for Housing in the Last Year: No    Unstable Housing in the Last Year: No     Current Outpatient Medications on File Prior to Visit   Medication Sig Dispense Refill    ANTIOX #11/OM3/DHA/EPA/LUT/NAMAN (50+ ADULT EYE HEALTH ORAL) Take by mouth.      aspirin 81 MG chewable tablet Take 81 mg by mouth once daily.      cetirizine 10 mg Cap Zyrtec   1 po q day      furosemide (LASIX) 40 MG tablet Take 1 tablet (40 mg total) by mouth 3 (three) times daily. (Patient taking differently: Take 40 mg by mouth 3 (three) times daily. 2 tablets in am 1 tablet nightly) 270 tablet 3    levothyroxine (SYNTHROID) 125 MCG tablet Take 1 tablet (125 mcg total) by mouth once daily. 90 tablet 3    MAGNESIUM ORAL Take by mouth once daily.      metoprolol succinate (TOPROL-XL) 100 MG 24 hr tablet Take 1 tablet (100 mg total) by mouth once daily. 90 tablet 3    mometasone (ELOCON) 0.1 % ointment aaa bid x 1-2 weeks then prn 45 g 0    potassium chloride SA (K-DUR,KLOR-CON) 20 MEQ tablet Take 1 tablet (20 mEq total) by mouth once daily. 90 tablet 3    rosuvastatin (CRESTOR) 10 MG tablet Take 1 tablet (10 mg total) by mouth once daily. 90 tablet 3    pneumoc 20-steffany conj-dip  cr,PF, (PREVNAR 20, PF,) 0.5 mL Syrg injection Inject 0.5 mLs into the muscle once. for 1 dose (Patient not taking: Reported on 11/23/2022) 0.5 mL 0     No current facility-administered medications on file prior to visit.     Review of patient's allergies indicates:   Allergen Reactions    No known drug allergies        Objective:     Physical Exam  Vitals and nursing note reviewed.   Constitutional:       Appearance: She is well-developed.   HENT:      Head: Normocephalic and atraumatic.   Eyes:      Conjunctiva/sclera: Conjunctivae normal.      Pupils: Pupils are equal, round, and reactive to light.   Cardiovascular:      Rate and Rhythm: Normal rate and regular rhythm.      Pulses: Intact distal pulses.      Heart sounds: Normal heart sounds.   Pulmonary:      Effort: Pulmonary effort is normal.      Breath sounds: Normal breath sounds.   Abdominal:      General: Bowel sounds are normal.      Palpations: Abdomen is soft.   Musculoskeletal:         General: Normal range of motion.      Cervical back: Normal range of motion and neck supple.   Skin:     General: Skin is warm and dry.   Neurological:      Mental Status: She is alert and oriented to person, place, and time.       Assessment:     1. Ostium secundum type atrial septal defect    2. Paroxysmal supraventricular tachycardia    3. Hypertension, unspecified type    4. Pure hypercholesterolemia        Plan:     Ostium secundum type atrial septal defect    Paroxysmal supraventricular tachycardia    Hypertension, unspecified type    Pure hypercholesterolemia    Continue metoprolol, lasix-htn/svt  Continue statin-hlp  Exercise

## 2022-11-28 ENCOUNTER — PATIENT MESSAGE (OUTPATIENT)
Dept: FAMILY MEDICINE | Facility: CLINIC | Age: 67
End: 2022-11-28
Payer: COMMERCIAL

## 2022-11-29 ENCOUNTER — CLINICAL SUPPORT (OUTPATIENT)
Dept: FAMILY MEDICINE | Facility: CLINIC | Age: 67
End: 2022-11-29
Payer: COMMERCIAL

## 2022-11-29 ENCOUNTER — PATIENT MESSAGE (OUTPATIENT)
Dept: FAMILY MEDICINE | Facility: CLINIC | Age: 67
End: 2022-11-29

## 2022-11-29 DIAGNOSIS — Z23 IMMUNIZATION DUE: Primary | ICD-10-CM

## 2022-11-29 PROCEDURE — 90677 PCV20 VACCINE IM: CPT | Mod: S$GLB,,, | Performed by: FAMILY MEDICINE

## 2022-11-29 PROCEDURE — 99999 PR PBB SHADOW E&M-EST. PATIENT-LVL II: CPT | Mod: PBBFAC,,,

## 2022-11-29 PROCEDURE — 99999 PR PBB SHADOW E&M-EST. PATIENT-LVL II: ICD-10-PCS | Mod: PBBFAC,,,

## 2022-11-29 PROCEDURE — 90471 IMMUNIZATION ADMIN: CPT | Mod: S$GLB,,, | Performed by: FAMILY MEDICINE

## 2022-11-29 PROCEDURE — 90471 PNEUMOCOCCAL CONJUGATE VACCINE 20-VALENT: ICD-10-PCS | Mod: S$GLB,,, | Performed by: FAMILY MEDICINE

## 2022-11-29 PROCEDURE — 90677 PNEUMOCOCCAL CONJUGATE VACCINE 20-VALENT: ICD-10-PCS | Mod: S$GLB,,, | Performed by: FAMILY MEDICINE

## 2022-12-08 ENCOUNTER — HOSPITAL ENCOUNTER (OUTPATIENT)
Dept: RADIOLOGY | Facility: HOSPITAL | Age: 67
Discharge: HOME | End: 2022-12-08
Attending: NURSE PRACTITIONER
Payer: COMMERCIAL

## 2022-12-08 DIAGNOSIS — R19.00 INTRA-ABDOMINAL AND PELVIC SWELLING, MASS AND LUMP, UNSPECIFIED SITE: ICD-10-CM

## 2022-12-08 PROCEDURE — 74183 MRI ABD W/O CNTR FLWD CNTR: CPT | Mod: TC,PO

## 2022-12-08 PROCEDURE — 25500020 PHARM REV CODE 255: Mod: PO | Performed by: NURSE PRACTITIONER

## 2022-12-08 PROCEDURE — 74183 MRI ABD W/O CNTR FLWD CNTR: CPT | Mod: 26,,, | Performed by: RADIOLOGY

## 2022-12-08 PROCEDURE — A9585 GADOBUTROL INJECTION: HCPCS | Mod: PO | Performed by: NURSE PRACTITIONER

## 2022-12-08 PROCEDURE — 74183 MRI ABDOMEN W WO CONTRAST: ICD-10-PCS | Mod: 26,,, | Performed by: RADIOLOGY

## 2022-12-08 RX ORDER — GADOBUTROL 604.72 MG/ML
8 INJECTION INTRAVENOUS
Status: COMPLETED | OUTPATIENT
Start: 2022-12-08 | End: 2022-12-08

## 2022-12-08 RX ADMIN — GADOBUTROL 8 ML: 604.72 INJECTION INTRAVENOUS at 11:12

## 2022-12-09 ENCOUNTER — TELEPHONE (OUTPATIENT)
Dept: FAMILY MEDICINE | Facility: CLINIC | Age: 67
End: 2022-12-09
Payer: COMMERCIAL

## 2022-12-09 DIAGNOSIS — K86.2 CYST OF PANCREAS: Primary | ICD-10-CM

## 2022-12-12 ENCOUNTER — TELEPHONE (OUTPATIENT)
Dept: GASTROENTEROLOGY | Facility: CLINIC | Age: 67
End: 2022-12-12
Payer: COMMERCIAL

## 2022-12-12 NOTE — TELEPHONE ENCOUNTER
----- Message from Aicha Henry MA sent at 12/12/2022  2:25 PM CST -----  Contact: Mallory  I see that you spoke with this patient earlier today???      ----- Message -----  From: Geronimo Brown  Sent: 12/12/2022   2:21 PM CST  To: [facility] Staff    Patient is calling to speak with the nurse in regards to appt. Reports would like to be seen by Dr. Ansari and was advised that her appt was 2:45pm. Gave patient appt info on chart but she would like a callback from the nurse because she states the information is not correct. Please give patient a callback at [phone]

## 2022-12-12 NOTE — TELEPHONE ENCOUNTER
----- Message from Joana Hernandez sent at 12/12/2022 12:26 PM CST -----  Contact: Mallory  Patient is calling to speak with the nurse regarding appt. Reports having referral and received a call to schedule but referral is showing pending authorization in [place]. Please give patient a call back at [phone]

## 2022-12-12 NOTE — TELEPHONE ENCOUNTER
Returned call and spoke with patient about her upcoming appointment. Answered all questions and patient verbalized understanding.

## 2022-12-15 ENCOUNTER — OFFICE VISIT (OUTPATIENT)
Dept: GASTROENTEROLOGY | Facility: CLINIC | Age: 67
End: 2022-12-15
Payer: COMMERCIAL

## 2022-12-15 VITALS
BODY MASS INDEX: 33.31 KG/M2 | HEIGHT: 62 IN | DIASTOLIC BLOOD PRESSURE: 78 MMHG | WEIGHT: 181 LBS | SYSTOLIC BLOOD PRESSURE: 148 MMHG

## 2022-12-15 DIAGNOSIS — K86.2 CYST OF PANCREAS: ICD-10-CM

## 2022-12-15 PROCEDURE — 99999 PR PBB SHADOW E&M-EST. PATIENT-LVL IV: ICD-10-PCS | Mod: PBBFAC,,, | Performed by: INTERNAL MEDICINE

## 2022-12-15 PROCEDURE — 99999 PR PBB SHADOW E&M-EST. PATIENT-LVL IV: CPT | Mod: PBBFAC,,, | Performed by: INTERNAL MEDICINE

## 2022-12-15 PROCEDURE — 99204 OFFICE O/P NEW MOD 45 MIN: CPT | Mod: S$GLB,,, | Performed by: INTERNAL MEDICINE

## 2022-12-15 PROCEDURE — 3077F PR MOST RECENT SYSTOLIC BLOOD PRESSURE >= 140 MM HG: ICD-10-PCS | Mod: CPTII,S$GLB,, | Performed by: INTERNAL MEDICINE

## 2022-12-15 PROCEDURE — 99204 PR OFFICE/OUTPT VISIT, NEW, LEVL IV, 45-59 MIN: ICD-10-PCS | Mod: S$GLB,,, | Performed by: INTERNAL MEDICINE

## 2022-12-15 PROCEDURE — 3078F PR MOST RECENT DIASTOLIC BLOOD PRESSURE < 80 MM HG: ICD-10-PCS | Mod: CPTII,S$GLB,, | Performed by: INTERNAL MEDICINE

## 2022-12-15 PROCEDURE — 3008F PR BODY MASS INDEX (BMI) DOCUMENTED: ICD-10-PCS | Mod: CPTII,S$GLB,, | Performed by: INTERNAL MEDICINE

## 2022-12-15 PROCEDURE — 3077F SYST BP >= 140 MM HG: CPT | Mod: CPTII,S$GLB,, | Performed by: INTERNAL MEDICINE

## 2022-12-15 PROCEDURE — 3008F BODY MASS INDEX DOCD: CPT | Mod: CPTII,S$GLB,, | Performed by: INTERNAL MEDICINE

## 2022-12-15 PROCEDURE — 3078F DIAST BP <80 MM HG: CPT | Mod: CPTII,S$GLB,, | Performed by: INTERNAL MEDICINE

## 2022-12-15 NOTE — PROGRESS NOTES
Clinic Consult:  [facility] Consultation Note    Reason for Consult:  The encounter diagnosis was Cyst of pancreas.    PCP: Latrell Banks   No address on file    HPI:  This is a 67 y.o. female here for evaluation of pancreatic cysts.    MRCP was performed on December 8, 2022 that showed 2 cysts 1 was 6 mm and the other was 4 mm at the pancreatic body was no pancreatic duct dilation.  No mass was seen in the pancreas.      The patient is asymptomatic and has no family history of pancreatic cancer or pancreatic cysts.  She has a sister had breast cancer but is not aware of her BRCA status.  Denied smoking or drinking.      ROS:  CONSTITUTIONAL: Denies weight change,  fatigue, fevers, chills, night sweats.  EYES: No changes in vision.   ENT: No oral lesions or sore throat.  HEMATOLOGICAL/Lymph: Denies bleeding tendency, bruising tendency. No swellings or enlarged lymph nodes.  CARDIOVASCULAR: Denies chest pain, shortness of breath, orthopnea and edema.  RESPIRATORY: Denies cough, hemoptysis, dyspnea, and wheezing.  GI: See HPI.  : Denies dysuria and hematuria  MUSCULOSKELETAL: Denies joint pain or swelling, back pain and muscle pain.  SKIN: Denies rashes.  NEUROLOGIC: Denies headaches, seizures and numbness.  PSYCHIATRIC: Denies depression or anxiety.  ENDOCRINE: Denies heat or cold intolerance and excessive thirst or urination.    Medical History:   Past Medical History:   Diagnosis Date    *Atrial flutter     s/p ablation    Abnormal Pap smear of cervix     ASD (atrial septal defect), ostium secundum     repaired    Benign hypertensive heart disease without heart failure     Endometriosis     Fatty liver     Hyperlipidemia     Hypertension     2009    Nodular goiter (nontoxic)     1999    Special screening for malignant neoplasms, colon 10/28/2014       Surgical History:  Past Surgical History:   Procedure Laterality Date    ABLATION OF DYSRHYTHMIC FOCUS      ASD REPAIR, SECUNDUM      CARPAL TUNNEL  RELEASE  2007    right hand    COLONOSCOPY N/A 1/14/2020    Procedure: COLONOSCOPY;  Surgeon: Latrell Banks MD;  Location: [facility];  Service: Endoscopy;  Laterality: N/A;    EYE SURGERY  11/2019 12/2019 right eye     Heart ablation  2011    OOPHORECTOMY      Open Heart Surgery  1970    OVARIAN CYST SURGERY  1981       Family History:   Family History   Problem Relation Age of Onset    Hypertension Mother     Arthritis Mother     Hearing loss Mother         cholesteatoma surgery    Cancer Father         colon    Diabetes Father     Breast cancer Sister     Cancer Sister         Breast    Aneurysm Brother         this was of his heart.    Birth defects Brother         Heart murmer    Heart disease Brother     Hypertension Brother     Cancer Brother         Prostate Cancer    Diabetes Brother     Diabetes Brother     Hypertension Brother     Diabetes Sister     Heart disease Sister     Hypertension Sister     Stroke Sister     Melanoma Neg Hx        Social History:   Social History     Tobacco Use    Smoking status: Never    Smokeless tobacco: Never   Substance Use Topics    Alcohol use: Yes     Comment: occasional glass of red wine    Drug use: No       Allergies: Reviewed    Home Medications:   Medication List with Changes/Refills   Current Medications    ANTIOX #11/OM3/DHA/EPA/LUT/NAMAN (50+ ADULT EYE HEALTH ORAL)    Take by mouth.    ASPIRIN 81 MG CHEWABLE TABLET    Take 81 mg by mouth once daily.    CETIRIZINE 10 MG CAP    Zyrtec   1 po q day    FUROSEMIDE (LASIX) 40 MG TABLET    Take 1 tablet (40 mg total) by mouth 3 (three) times daily.    MAGNESIUM ORAL    Take by mouth once daily.    METOPROLOL SUCCINATE (TOPROL-XL) 100 MG 24 HR TABLET    Take 1 tablet (100 mg total) by mouth once daily.    MOMETASONE (ELOCON) 0.1 % OINTMENT    aaa bid x 1-2 weeks then prn    POTASSIUM CHLORIDE SA (K-DUR,KLOR-CON) 20 MEQ TABLET    Take 1 tablet (20 mEq total) by mouth once daily.    ROSUVASTATIN (CRESTOR) 10 MG TABLET     "Take 1 tablet (10 mg total) by mouth once daily.   Changed and/or Refilled Medications    Modified Medication Previous Medication    LEVOTHYROXINE (SYNTHROID) 125 MCG TABLET levothyroxine (SYNTHROID) 125 MCG tablet       TAKE 1 TABLET (125 MCG) BY MOUTH 1 TIME A DAY    Take 1 tablet (125 mcg total) by mouth once daily.         Physical Exam:  Vital Signs:  BP (!) 148/78   Ht 5' 2" (1.575 m)   Wt 82.1 kg (181 lb)   LMP 09/20/2011   BMI 33.10 kg/m²   Body mass index is 33.1 kg/m².      GENERAL: No acute distress, A&Ox3  EYES: Anicteric, no pallor noted.  ENT: OP clear  NECK: Supple, no masses, no thyromegally.  CHEST: Equal breath sounds bilaterally, no wheezing.  CARDIOVASCULAR: Regular rate and rhythm. Murmurs, rubs and gallops absent.  ABDOMEN: soft, non-tender, non-distended, normal bowel sounds, no hepatosplenomegaly   EXTREMITIES: No clubbing, cyanosis or edema.  SKIN: Without lesion or erythema.  LYMPH: No cervical, axillary lymphadenopathy palpable.   NEUROLOGICAL: Grossly normal, no asterixis present.    Labs: Pertinent labs reviewed.    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  There is no alarming signs seen such as pancreatic ductal dilation or mural enhancement or any nodularity and will need to survey the cyst to assess for any growth in size.  Repeat MRI MRCP in 1 year is recommended.  She does not have any family history or any risk factors but given her sister carries history of breast cancer asked her to check if he had been tested for the BRCA gene or power be too.  If she has any of those genes and the patient Mr. Shannon will need to be checked for that as well and then pancreatic cancer screening will be recommended at that point as well which would not interfere with her cyst surveillance which will be done as " well.    No follow-ups on file.        Thank you so much for allowing me to participate in the care of Mallory Alvarez MD  Gastroenterology  Director of Advanced Endoscopy at [facility]

## 2022-12-15 NOTE — PATIENT INSTRUCTIONS
MRI that you had showed that he had multiple small cysts with the largest being 6 mm in the pancreas.  The cysts are most likely something called a branch type IPMN.  These are types of cysts have a low risk of becoming cancer but need to be monitored.    I do not have any scans from prior available to review so the current MRI will be used as her baseline MRI to compare further scans with.  The risk of these becoming cancer in her case is low and the only recommendation at this time is to monitor these cysts by repeating an MRI/MRCP in 1 year.      On repeat MRCP is if the cyst is growing size, something grows inside of them, or the pancreas around the cysts have changes then endoscopic ultrasound would be indicated and scheduled.  This is a test by endoscopy when the camera the ultrasound is used to examine the pancreas and obtain biopsies were samples as needed.  Your current cysts are too small to be evaluated for this test and the yield would be very well.    Because of your sister's history of breast cancer I would recommend asking her if she has been tested for the BRCA gene she was found to have a BRCA1 or BRCA2 genes then you might need to be checked as well.  Carriers of the genes to have a slight increased risk of pancreas cancer.  Monitoring for pancreas cancer will be done by the MRI as well he will be scan.

## 2022-12-19 ENCOUNTER — PATIENT MESSAGE (OUTPATIENT)
Dept: GASTROENTEROLOGY | Facility: CLINIC | Age: 67
End: 2022-12-19
Payer: COMMERCIAL

## 2022-12-23 ENCOUNTER — PATIENT MESSAGE (OUTPATIENT)
Dept: FAMILY MEDICINE | Facility: CLINIC | Age: 67
End: 2022-12-23

## 2022-12-23 ENCOUNTER — OFFICE VISIT (OUTPATIENT)
Dept: FAMILY MEDICINE | Facility: CLINIC | Age: 67
End: 2022-12-23
Payer: COMMERCIAL

## 2022-12-23 VITALS
HEIGHT: 62 IN | WEIGHT: 182.56 LBS | HEART RATE: 74 BPM | TEMPERATURE: 98 F | BODY MASS INDEX: 33.6 KG/M2 | SYSTOLIC BLOOD PRESSURE: 115 MMHG | DIASTOLIC BLOOD PRESSURE: 72 MMHG

## 2022-12-23 DIAGNOSIS — U07.1 COVID-19: ICD-10-CM

## 2022-12-23 DIAGNOSIS — U07.1 COVID-19 VIRUS DETECTED: ICD-10-CM

## 2022-12-23 DIAGNOSIS — R05.9 COUGH, UNSPECIFIED TYPE: ICD-10-CM

## 2022-12-23 DIAGNOSIS — U07.1 COVID-19: Primary | ICD-10-CM

## 2022-12-23 LAB
CTP QC/QA: YES
CTP QC/QA: YES
POC MOLECULAR INFLUENZA A AGN: NEGATIVE
POC MOLECULAR INFLUENZA B AGN: NEGATIVE
SARS-COV-2 RDRP RESP QL NAA+PROBE: POSITIVE

## 2022-12-23 PROCEDURE — 3078F DIAST BP <80 MM HG: CPT | Mod: CPTII,S$GLB,, | Performed by: PHYSICIAN ASSISTANT

## 2022-12-23 PROCEDURE — 3288F FALL RISK ASSESSMENT DOCD: CPT | Mod: CPTII,S$GLB,, | Performed by: PHYSICIAN ASSISTANT

## 2022-12-23 PROCEDURE — 87635 SARS-COV-2 COVID-19 AMP PRB: CPT | Mod: QW,S$GLB,, | Performed by: PHYSICIAN ASSISTANT

## 2022-12-23 PROCEDURE — 99999 PR PBB SHADOW E&M-EST. PATIENT-LVL IV: ICD-10-PCS | Mod: PBBFAC,,, | Performed by: PHYSICIAN ASSISTANT

## 2022-12-23 PROCEDURE — 1159F PR MEDICATION LIST DOCUMENTED IN MEDICAL RECORD: ICD-10-PCS | Mod: CPTII,S$GLB,, | Performed by: PHYSICIAN ASSISTANT

## 2022-12-23 PROCEDURE — 99213 OFFICE O/P EST LOW 20 MIN: CPT | Mod: S$GLB,,, | Performed by: PHYSICIAN ASSISTANT

## 2022-12-23 PROCEDURE — 99999 PR PBB SHADOW E&M-EST. PATIENT-LVL IV: CPT | Mod: PBBFAC,,, | Performed by: PHYSICIAN ASSISTANT

## 2022-12-23 PROCEDURE — 1101F PR PT FALLS ASSESS DOC 0-1 FALLS W/OUT INJ PAST YR: ICD-10-PCS | Mod: CPTII,S$GLB,, | Performed by: PHYSICIAN ASSISTANT

## 2022-12-23 PROCEDURE — 3008F BODY MASS INDEX DOCD: CPT | Mod: CPTII,S$GLB,, | Performed by: PHYSICIAN ASSISTANT

## 2022-12-23 PROCEDURE — 3008F PR BODY MASS INDEX (BMI) DOCUMENTED: ICD-10-PCS | Mod: CPTII,S$GLB,, | Performed by: PHYSICIAN ASSISTANT

## 2022-12-23 PROCEDURE — 1160F PR REVIEW ALL MEDS BY PRESCRIBER/CLIN PHARMACIST DOCUMENTED: ICD-10-PCS | Mod: CPTII,S$GLB,, | Performed by: PHYSICIAN ASSISTANT

## 2022-12-23 PROCEDURE — 87502 POCT INFLUENZA A/B MOLECULAR: ICD-10-PCS | Mod: QW,S$GLB,, | Performed by: PHYSICIAN ASSISTANT

## 2022-12-23 PROCEDURE — 3074F SYST BP LT 130 MM HG: CPT | Mod: CPTII,S$GLB,, | Performed by: PHYSICIAN ASSISTANT

## 2022-12-23 PROCEDURE — 1101F PT FALLS ASSESS-DOCD LE1/YR: CPT | Mod: CPTII,S$GLB,, | Performed by: PHYSICIAN ASSISTANT

## 2022-12-23 PROCEDURE — 1160F RVW MEDS BY RX/DR IN RCRD: CPT | Mod: CPTII,S$GLB,, | Performed by: PHYSICIAN ASSISTANT

## 2022-12-23 PROCEDURE — 1126F AMNT PAIN NOTED NONE PRSNT: CPT | Mod: CPTII,S$GLB,, | Performed by: PHYSICIAN ASSISTANT

## 2022-12-23 PROCEDURE — 87502 INFLUENZA DNA AMP PROBE: CPT | Mod: QW,S$GLB,, | Performed by: PHYSICIAN ASSISTANT

## 2022-12-23 PROCEDURE — 1126F PR PAIN SEVERITY QUANTIFIED, NO PAIN PRESENT: ICD-10-PCS | Mod: CPTII,S$GLB,, | Performed by: PHYSICIAN ASSISTANT

## 2022-12-23 PROCEDURE — 87635: ICD-10-PCS | Mod: QW,S$GLB,, | Performed by: PHYSICIAN ASSISTANT

## 2022-12-23 PROCEDURE — 3288F PR FALLS RISK ASSESSMENT DOCUMENTED: ICD-10-PCS | Mod: CPTII,S$GLB,, | Performed by: PHYSICIAN ASSISTANT

## 2022-12-23 PROCEDURE — 3078F PR MOST RECENT DIASTOLIC BLOOD PRESSURE < 80 MM HG: ICD-10-PCS | Mod: CPTII,S$GLB,, | Performed by: PHYSICIAN ASSISTANT

## 2022-12-23 PROCEDURE — 99213 PR OFFICE/OUTPT VISIT, EST, LEVL III, 20-29 MIN: ICD-10-PCS | Mod: S$GLB,,, | Performed by: PHYSICIAN ASSISTANT

## 2022-12-23 PROCEDURE — 3074F PR MOST RECENT SYSTOLIC BLOOD PRESSURE < 130 MM HG: ICD-10-PCS | Mod: CPTII,S$GLB,, | Performed by: PHYSICIAN ASSISTANT

## 2022-12-23 PROCEDURE — 1159F MED LIST DOCD IN RCRD: CPT | Mod: CPTII,S$GLB,, | Performed by: PHYSICIAN ASSISTANT

## 2022-12-23 RX ORDER — HYDROCODONE POLISTIREX AND CHLORPHENIRAMINE POLISTIREX 10; 8 MG/5ML; MG/5ML
5 SUSPENSION, EXTENDED RELEASE ORAL EVERY 12 HOURS PRN
Qty: 90 ML | Refills: 0 | Status: CANCELLED | OUTPATIENT
Start: 2022-12-23 | End: 2023-01-02

## 2022-12-23 RX ORDER — HYDROCODONE POLISTIREX AND CHLORPHENIRAMINE POLISTIREX 10; 8 MG/5ML; MG/5ML
5 SUSPENSION, EXTENDED RELEASE ORAL EVERY 12 HOURS PRN
Qty: 90 ML | Refills: 0 | Status: SHIPPED | OUTPATIENT
Start: 2022-12-23 | End: 2023-01-02

## 2022-12-23 RX ORDER — HYDROCODONE POLISTIREX AND CHLORPHENIRAMINE POLISTIREX 10; 8 MG/5ML; MG/5ML
5 SUSPENSION, EXTENDED RELEASE ORAL EVERY 12 HOURS PRN
Qty: 90 ML | Refills: 0 | Status: SHIPPED | OUTPATIENT
Start: 2022-12-23 | End: 2022-12-23 | Stop reason: SDUPTHER

## 2022-12-23 NOTE — TELEPHONE ENCOUNTER
Patient's spouse reports previous pharmacy does not have the prescriptions and request is resent to above pharmacy.

## 2022-12-23 NOTE — PROGRESS NOTES
Assessment/Plan:    Problem List Items Addressed This Visit    None  Visit Diagnoses       COVID-19    -  Primary    Relevant Medications    nirmatrelvir-ritonavir 300 mg (150 mg x 2)-100 mg copackaged tablets (EUA)    Cough, unspecified type        Relevant Medications    hydrocodone-chlorpheniramine (TUSSIONEX) 10-8 mg/5 mL suspension    Other Relevant Orders    POCT COVID-19 Rapid Screening    POCT Influenza A/B Molecular        -COVID positive, risk score of 4  -plan to start Paxlovid, hold statin until completing the medication   -recommend pulse oximeter monitoring  -alternate Tylenol/Ibuprofen as needed for fever and pain  -increase hydration and resume physical activity as tolerated   -ER precautions for shortness of breath, decrease oxygenation, and/or severe symptoms    Follow up if symptoms worsen or fail to improve.    Nida Luo PA-C  _____________________________________________________________________________________________________________________________________________________    CC: cough    HPI: Patient is in clinic today as an established patient here for cough. This is a new problem. The current episode started >2 days ago. The problem is gradually worsening. Associated symptoms include fever (Tmax of 102.8), congestion, postnasal drip, headache, and body aches. Pertinent negatives include no chills, diaphoresis, ear pain, hoarse voice, neck pain, shortness of breath, sneezing, sore throat or swollen glands. Past treatments include Tussin-DM and Tylenol. The treatment provided minimal relief. She has family members and coworkers who have been sick with similar symptoms. No other complaints today.    Past Medical History:   Diagnosis Date    *Atrial flutter     s/p ablation    Abnormal Pap smear of cervix     ASD (atrial septal defect), ostium secundum     repaired    Benign hypertensive heart disease without heart failure     Endometriosis     Fatty liver     Hyperlipidemia      Hypertension     2009    Nodular goiter (nontoxic)     1999    Special screening for malignant neoplasms, colon 10/28/2014     Past Surgical History:   Procedure Laterality Date    ABLATION OF DYSRHYTHMIC FOCUS      ASD REPAIR, SECUNDUM      CARPAL TUNNEL RELEASE  2007    right hand    COLONOSCOPY N/A 1/14/2020    Procedure: COLONOSCOPY;  Surgeon: Latrell Banks MD;  Location: [facility];  Service: Endoscopy;  Laterality: N/A;    EYE SURGERY  11/2019 12/2019 right eye     Heart ablation  2011    OOPHORECTOMY      Open Heart Surgery  1970    OVARIAN CYST SURGERY  1981     Review of patient's allergies indicates:   Allergen Reactions    No known drug allergies      Social History     Tobacco Use    Smoking status: Never    Smokeless tobacco: Never   Substance Use Topics    Alcohol use: Yes     Comment: occasional glass of red wine    Drug use: No     Family History   Problem Relation Age of Onset    Hypertension Mother     Arthritis Mother     Hearing loss Mother         cholesteatoma surgery    Cancer Father         colon    Diabetes Father     Breast cancer Sister     Cancer Sister         Breast    Aneurysm Brother         this was of his heart.    Birth defects Brother         Heart murmer    Heart disease Brother     Hypertension Brother     Cancer Brother         Prostate Cancer    Diabetes Brother     Diabetes Brother     Hypertension Brother     Diabetes Sister     Heart disease Sister     Hypertension Sister     Stroke Sister     Melanoma Neg Hx      Current Outpatient Medications on File Prior to Visit   Medication Sig Dispense Refill    ANTIOX #11/OM3/DHA/EPA/LUT/NAMAN (50+ ADULT EYE HEALTH ORAL) Take by mouth.      aspirin 81 MG chewable tablet Take 81 mg by mouth once daily.      cetirizine 10 mg Cap Zyrtec   1 po q day      furosemide (LASIX) 40 MG tablet Take 1 tablet (40 mg total) by mouth 3 (three) times daily. (Patient taking differently: Take 40 mg by mouth 3 (three) times daily. 2 tablets in am 1  "tablet nightly) 270 tablet 3    levothyroxine (SYNTHROID) 125 MCG tablet TAKE 1 TABLET (125 MCG) BY MOUTH 1 TIME A DAY 90 tablet 0    MAGNESIUM ORAL Take by mouth once daily.      metoprolol succinate (TOPROL-XL) 100 MG 24 hr tablet Take 1 tablet (100 mg total) by mouth once daily. 90 tablet 3    mometasone (ELOCON) 0.1 % ointment aaa bid x 1-2 weeks then prn 45 g 0    potassium chloride SA (K-DUR,KLOR-CON) 20 MEQ tablet Take 1 tablet (20 mEq total) by mouth once daily. 90 tablet 3    rosuvastatin (CRESTOR) 10 MG tablet Take 1 tablet (10 mg total) by mouth once daily. 90 tablet 3     No current facility-administered medications on file prior to visit.       Review of Systems   Constitutional:  Positive for fever. Negative for chills, diaphoresis and fatigue.   HENT:  Positive for congestion and postnasal drip. Negative for ear pain, sinus pain and sore throat.    Eyes:  Negative for pain and redness.   Respiratory:  Positive for cough. Negative for chest tightness and shortness of breath.    Cardiovascular:  Negative for chest pain and leg swelling.   Gastrointestinal:  Negative for abdominal pain, constipation, diarrhea, nausea and vomiting.   Genitourinary:  Negative for dysuria and hematuria.   Musculoskeletal:  Positive for myalgias. Negative for arthralgias and joint swelling.   Skin:  Negative for rash.   Neurological:  Positive for headaches. Negative for dizziness and syncope.   Psychiatric/Behavioral:  Negative for dysphoric mood. The patient is not nervous/anxious.      Vitals:    12/23/22 0812   BP: 115/72   Pulse: 74   Temp: 97.9 °F (36.6 °C)   TempSrc: Temporal   Weight: 82.8 kg (182 lb 8.7 oz)   Height: 5' 2" (1.575 m)       Wt Readings from Last 3 Encounters:   12/23/22 82.8 kg (182 lb 8.7 oz)   12/15/22 82.1 kg (181 lb)   11/23/22 82.1 kg (181 lb)       Physical Exam  Constitutional:       General: She is not in acute distress.     Appearance: Normal appearance. She is well-developed.   HENT:      " Head: Normocephalic and atraumatic.      Right Ear: Tympanic membrane normal.      Left Ear: Tympanic membrane normal.      Nose: Congestion present.      Mouth/Throat:      Mouth: Mucous membranes are moist.      Pharynx: Oropharynx is clear.   Eyes:      Conjunctiva/sclera: Conjunctivae normal.   Cardiovascular:      Rate and Rhythm: Normal rate and regular rhythm.      Pulses: Normal pulses.      Heart sounds: Normal heart sounds. No murmur heard.  Pulmonary:      Effort: Pulmonary effort is normal. No respiratory distress.      Breath sounds: Normal breath sounds. No wheezing.   Abdominal:      General: Bowel sounds are normal. There is no distension.      Palpations: Abdomen is soft.      Tenderness: There is no abdominal tenderness.   Musculoskeletal:         General: Normal range of motion.      Cervical back: Normal range of motion and neck supple.   Skin:     General: Skin is warm and dry.      Findings: No rash.   Neurological:      General: No focal deficit present.      Mental Status: She is alert and oriented to person, place, and time.   Psychiatric:         Mood and Affect: Mood normal.         Behavior: Behavior normal.       Health Maintenance   Topic Date Due    Mammogram  11/10/2023    DEXA Scan  10/27/2024    Lipid Panel  11/10/2027    TETANUS VACCINE  06/07/2028    Hepatitis C Screening  Completed

## 2022-12-23 NOTE — TELEPHONE ENCOUNTER
----- Message from Serina Chance sent at 12/23/2022  9:33 AM CST -----  Contact: Alexi/Spouse  Type:  RX Refill Request                           (Two Medications Listed)   Who Called: Alexi  Refill or New Rx:refill  RX Name and Strength:hydrocodone-chlorpheniramine (TUSSIONEX) 10-8 mg/5 mL suspension  How is the patient currently taking it? (ex. 1XDay):as prescribed  Is this a 30 day or 90 day RX:90  Preferred Pharmacy with phone number:  [place], [place]  - [address]  Phone: [phone] Fax: [phone]   Local or Mail Order:local  Ordering Provider:Nida Luo  Would the patient rather a call back or a response via [facility]? call  Best Call Back Number:[phone]  Additional Information: Patient's spouse reports previous pharmacy does not have the prescriptions and request is resent to above pharmacy.   Type:  RX Refill Request  Who Called: Alexi  Refill or New Rx:refill  RX Name and Strength:paxlovid   How is the patient currently taking it? (ex. 1XDay):as prescribed  Is this a 30 day or 90 day RX:30  Preferred Pharmacy with phone number:  [place], [place] - [facility] [address]  Phone: [phone] Fax: [phone]   Local or Mail Order:local  Ordering Provider:Nida Luo  Would the patient rather a call back or a response via [facility]? call  Best Call Back Number:[phone]  Additional Information: Patient's spouse reports previous pharmacy does not have the prescriptions and request is resent to above pharmacy.   Thank you,  GH

## 2022-12-23 NOTE — PATIENT INSTRUCTIONS
Instructions for Patients with Confirmed or Suspected COVID-19    If you are awaiting your test result, you will either be called or it will be released to the patient portal.  If you have any questions about COVID, please visit [email or web address] or call our COVID-19 information line at [phone].      Please isolate yourself at home.  You may leave home and/or return to work once the following conditions are met:    If you have symptoms and tested positive:  More than 5 days since symptoms first appeared AND  More than 24 hours fever free without medications AND       symptoms have improved   For five days after ending isolation, masks are required.    If you had no symptoms but tested positive:  More than 5 days since the date of the first positive test. If you develop symptoms, then use the guidelines above  For five days after ending isolation, masks are required.      Testing is not recommended if you are symptom free after completing isolation.

## 2023-03-09 ENCOUNTER — PATIENT MESSAGE (OUTPATIENT)
Dept: FAMILY MEDICINE | Facility: CLINIC | Age: 68
End: 2023-03-09
Payer: COMMERCIAL

## 2023-03-09 DIAGNOSIS — Z13.1 SCREENING FOR DIABETES MELLITUS: Primary | ICD-10-CM

## 2023-03-09 DIAGNOSIS — E03.9 HYPOTHYROIDISM, UNSPECIFIED TYPE: ICD-10-CM

## 2023-03-10 ENCOUNTER — LAB VISIT (OUTPATIENT)
Dept: LAB | Facility: HOSPITAL | Age: 68
End: 2023-03-10
Attending: FAMILY MEDICINE
Payer: COMMERCIAL

## 2023-03-10 ENCOUNTER — PATIENT MESSAGE (OUTPATIENT)
Dept: FAMILY MEDICINE | Facility: CLINIC | Age: 68
End: 2023-03-10
Payer: COMMERCIAL

## 2023-03-10 DIAGNOSIS — Z13.1 SCREENING FOR DIABETES MELLITUS: ICD-10-CM

## 2023-03-10 DIAGNOSIS — E03.9 HYPOTHYROIDISM, UNSPECIFIED TYPE: ICD-10-CM

## 2023-03-10 LAB
ESTIMATED AVG GLUCOSE: 131 MG/DL (ref 68–131)
HBA1C MFR BLD: 6.2 % (ref 4–5.6)

## 2023-03-10 PROCEDURE — 84439 ASSAY OF FREE THYROXINE: CPT | Performed by: FAMILY MEDICINE

## 2023-03-10 PROCEDURE — 84443 ASSAY THYROID STIM HORMONE: CPT | Performed by: FAMILY MEDICINE

## 2023-03-10 PROCEDURE — 83036 HEMOGLOBIN GLYCOSYLATED A1C: CPT | Performed by: FAMILY MEDICINE

## 2023-03-10 PROCEDURE — 36415 COLL VENOUS BLD VENIPUNCTURE: CPT | Mod: PO | Performed by: FAMILY MEDICINE

## 2023-03-10 NOTE — TELEPHONE ENCOUNTER
I have signed for the following orders AND/OR meds.  Please call the patient and ask the patient to schedule the testing AND/OR inform about any medications that were sent.      Orders Placed This Encounter   Procedures    Hemoglobin A1C     Standing Status:   Future     Standing Expiration Date:   3/8/2024

## 2023-03-11 DIAGNOSIS — E03.9 HYPOTHYROIDISM, UNSPECIFIED TYPE: Primary | ICD-10-CM

## 2023-03-11 LAB
T4 FREE SERPL-MCNC: 0.86 NG/DL (ref 0.71–1.51)
TSH SERPL DL<=0.005 MIU/L-ACNC: 7.2 UIU/ML (ref 0.4–4)

## 2023-03-11 RX ORDER — LEVOTHYROXINE SODIUM 150 UG/1
150 TABLET ORAL
Qty: 90 TABLET | Refills: 0 | Status: SHIPPED | OUTPATIENT
Start: 2023-03-11 | End: 2023-05-15 | Stop reason: SDUPTHER

## 2023-03-14 ENCOUNTER — TELEPHONE (OUTPATIENT)
Dept: PRIMARY CARE CLINIC | Facility: CLINIC | Age: 68
End: 2023-03-14
Payer: COMMERCIAL

## 2023-03-14 DIAGNOSIS — R73.09 ELEVATED GLUCOSE: Primary | ICD-10-CM

## 2023-03-14 NOTE — TELEPHONE ENCOUNTER
I have signed for the following orders AND/OR meds.  Please call the patient and ask the patient to schedule the testing AND/OR inform about any medications that were sent.      Orders Placed This Encounter   Procedures    Hemoglobin A1C     Standing Status:   Standing     Number of Occurrences:   99     Standing Expiration Date:   3/9/2043

## 2023-03-14 NOTE — TELEPHONE ENCOUNTER
----- Message from Latrell Banks MD sent at 3/11/2023  5:49 PM CST -----  I have reviewed the labs and am recommending the following:  A1C ABNORMAL-The A1c is abnormal.  We need to order an a1c to be done 3 months from now.  Start a weight loss diet and increase exercise to help with the weight and to lower the glucose.  If it goes much higher, I will need to start medicine for what will be diabetes if the a1c goes to 6.5.     TSH ABNORMAL-The TSH is abnormal.  We need to send a prescription for LEVOTHYROXINE at a dose of 150 mcg a day and refill x 1.   We need to place an order for a TSH in 2 months.      Health maintenance items that remain on your list that need to be arranged are listed below.   Please notify me if you are prepared to get them completed.    COVID-19 Vaccine(4 - Booster for Moderna series) due on 02/08/2022    Dr. Latrell Banks

## 2023-04-24 ENCOUNTER — OFFICE VISIT (OUTPATIENT)
Dept: FAMILY MEDICINE | Facility: CLINIC | Age: 68
End: 2023-04-24
Payer: COMMERCIAL

## 2023-04-24 VITALS
BODY MASS INDEX: 32.57 KG/M2 | DIASTOLIC BLOOD PRESSURE: 69 MMHG | WEIGHT: 177 LBS | RESPIRATION RATE: 18 BRPM | TEMPERATURE: 100 F | HEIGHT: 62 IN | SYSTOLIC BLOOD PRESSURE: 122 MMHG | HEART RATE: 75 BPM

## 2023-04-24 DIAGNOSIS — B96.89 ACUTE BACTERIAL SINUSITIS: Primary | ICD-10-CM

## 2023-04-24 DIAGNOSIS — J01.90 ACUTE BACTERIAL SINUSITIS: Primary | ICD-10-CM

## 2023-04-24 DIAGNOSIS — J02.9 SORE THROAT: ICD-10-CM

## 2023-04-24 DIAGNOSIS — R05.9 COUGH, UNSPECIFIED TYPE: ICD-10-CM

## 2023-04-24 LAB
CTP QC/QA: YES
CTP QC/QA: YES
S PYO RRNA THROAT QL PROBE: NEGATIVE
SARS-COV-2 RDRP RESP QL NAA+PROBE: NEGATIVE

## 2023-04-24 PROCEDURE — 3008F BODY MASS INDEX DOCD: CPT | Mod: CPTII,S$GLB,, | Performed by: PHYSICIAN ASSISTANT

## 2023-04-24 PROCEDURE — 3044F HG A1C LEVEL LT 7.0%: CPT | Mod: CPTII,S$GLB,, | Performed by: PHYSICIAN ASSISTANT

## 2023-04-24 PROCEDURE — 3074F PR MOST RECENT SYSTOLIC BLOOD PRESSURE < 130 MM HG: ICD-10-PCS | Mod: CPTII,S$GLB,, | Performed by: PHYSICIAN ASSISTANT

## 2023-04-24 PROCEDURE — 1159F MED LIST DOCD IN RCRD: CPT | Mod: CPTII,S$GLB,, | Performed by: PHYSICIAN ASSISTANT

## 2023-04-24 PROCEDURE — 87880 POCT RAPID STREP A: ICD-10-PCS | Mod: QW,S$GLB,, | Performed by: PHYSICIAN ASSISTANT

## 2023-04-24 PROCEDURE — 1125F AMNT PAIN NOTED PAIN PRSNT: CPT | Mod: CPTII,S$GLB,, | Performed by: PHYSICIAN ASSISTANT

## 2023-04-24 PROCEDURE — 3074F SYST BP LT 130 MM HG: CPT | Mod: CPTII,S$GLB,, | Performed by: PHYSICIAN ASSISTANT

## 2023-04-24 PROCEDURE — 99999 PR PBB SHADOW E&M-EST. PATIENT-LVL V: CPT | Mod: PBBFAC,,, | Performed by: PHYSICIAN ASSISTANT

## 2023-04-24 PROCEDURE — 1125F PR PAIN SEVERITY QUANTIFIED, PAIN PRESENT: ICD-10-PCS | Mod: CPTII,S$GLB,, | Performed by: PHYSICIAN ASSISTANT

## 2023-04-24 PROCEDURE — 99213 OFFICE O/P EST LOW 20 MIN: CPT | Mod: S$GLB,,, | Performed by: PHYSICIAN ASSISTANT

## 2023-04-24 PROCEDURE — 1159F PR MEDICATION LIST DOCUMENTED IN MEDICAL RECORD: ICD-10-PCS | Mod: CPTII,S$GLB,, | Performed by: PHYSICIAN ASSISTANT

## 2023-04-24 PROCEDURE — 87635 SARS-COV-2 COVID-19 AMP PRB: CPT | Mod: QW,S$GLB,, | Performed by: PHYSICIAN ASSISTANT

## 2023-04-24 PROCEDURE — 1160F PR REVIEW ALL MEDS BY PRESCRIBER/CLIN PHARMACIST DOCUMENTED: ICD-10-PCS | Mod: CPTII,S$GLB,, | Performed by: PHYSICIAN ASSISTANT

## 2023-04-24 PROCEDURE — 99213 PR OFFICE/OUTPT VISIT, EST, LEVL III, 20-29 MIN: ICD-10-PCS | Mod: S$GLB,,, | Performed by: PHYSICIAN ASSISTANT

## 2023-04-24 PROCEDURE — 3008F PR BODY MASS INDEX (BMI) DOCUMENTED: ICD-10-PCS | Mod: CPTII,S$GLB,, | Performed by: PHYSICIAN ASSISTANT

## 2023-04-24 PROCEDURE — 87880 STREP A ASSAY W/OPTIC: CPT | Mod: QW,S$GLB,, | Performed by: PHYSICIAN ASSISTANT

## 2023-04-24 PROCEDURE — 3044F PR MOST RECENT HEMOGLOBIN A1C LEVEL <7.0%: ICD-10-PCS | Mod: CPTII,S$GLB,, | Performed by: PHYSICIAN ASSISTANT

## 2023-04-24 PROCEDURE — 99999 PR PBB SHADOW E&M-EST. PATIENT-LVL V: ICD-10-PCS | Mod: PBBFAC,,, | Performed by: PHYSICIAN ASSISTANT

## 2023-04-24 PROCEDURE — 3078F PR MOST RECENT DIASTOLIC BLOOD PRESSURE < 80 MM HG: ICD-10-PCS | Mod: CPTII,S$GLB,, | Performed by: PHYSICIAN ASSISTANT

## 2023-04-24 PROCEDURE — 1160F RVW MEDS BY RX/DR IN RCRD: CPT | Mod: CPTII,S$GLB,, | Performed by: PHYSICIAN ASSISTANT

## 2023-04-24 PROCEDURE — 87635: ICD-10-PCS | Mod: QW,S$GLB,, | Performed by: PHYSICIAN ASSISTANT

## 2023-04-24 PROCEDURE — 3078F DIAST BP <80 MM HG: CPT | Mod: CPTII,S$GLB,, | Performed by: PHYSICIAN ASSISTANT

## 2023-04-24 RX ORDER — AZITHROMYCIN 250 MG/1
TABLET, FILM COATED ORAL
Qty: 6 TABLET | Refills: 0 | Status: SHIPPED | OUTPATIENT
Start: 2023-04-24 | End: 2023-08-24

## 2023-04-24 RX ORDER — HYDROCODONE POLISTIREX AND CHLORPHENIRAMINE POLISTIREX 10; 8 MG/5ML; MG/5ML
5 SUSPENSION, EXTENDED RELEASE ORAL EVERY 12 HOURS PRN
Qty: 70 ML | Refills: 0 | Status: SHIPPED | OUTPATIENT
Start: 2023-04-24 | End: 2023-04-24 | Stop reason: ALTCHOICE

## 2023-04-24 RX ORDER — CODEINE PHOSPHATE AND GUAIFENESIN 10; 100 MG/5ML; MG/5ML
SOLUTION ORAL
Qty: 118 ML | Refills: 0 | Status: SHIPPED | OUTPATIENT
Start: 2023-04-24 | End: 2023-12-06

## 2023-04-24 RX ORDER — FLUTICASONE PROPIONATE 50 MCG
SPRAY, SUSPENSION (ML) NASAL
Qty: 16 G | Refills: 0 | Status: SHIPPED | OUTPATIENT
Start: 2023-04-24 | End: 2023-12-06 | Stop reason: SDUPTHER

## 2023-04-24 NOTE — PROGRESS NOTES
Assessment/Plan:    Problem List Items Addressed This Visit    None  Visit Diagnoses       Acute bacterial sinusitis    -  Primary    Relevant Medications    azithromycin (Z-DWAYNE) 250 MG tablet    fluticasone propionate (FLONASE) 50 mcg/actuation nasal spray    Sore throat        Relevant Orders    POCT Rapid Strep A (Completed)    POCT COVID-19 Rapid Screening (Completed)    Cough, unspecified type        Relevant Medications    guaiFENesin-codeine 100-10 mg/5 ml (TUSSI-ORGANIDIN NR)  mg/5 mL syrup        -start antibiotics as prescribed  -recommend Flonase and Mucinex  -supportive care- rest, increase hydration with water, OTC Tylenol/Ibuprofen for pain/fever  -follow up if no improvement in symptoms   -ER precautions for severe or worsening of symptoms     Follow up if symptoms worsen or fail to improve.    Nida Luo PA-C  _____________________________________________________________________________________________________________________________________________________    CC: sore throat     HPI: Patient is in clinic today as an established patient here for sore throat. This is a new problem. The current episode started >5 days ago. The problem is gradually worsening. Associated symptoms include congestion, sinus pressure, postnasal drip, and productive cough. Pertinent negatives include no fever, chills, diaphoresis, ear pain, headaches, hoarse voice, neck pain, shortness of breath, sneezing, or swollen glands. Past treatments include cough drops, Tylenol, and Ibuprofen. The treatment provided minimal relief. Her  has been sick with similar symptoms. 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 No other complaints today.     Past Medical History:   Diagnosis Date    *Atrial flutter     s/p ablation    Abnormal Pap smear of cervix     ASD (atrial septal defect), ostium secundum     repaired    Benign  hypertensive heart disease without heart failure     Endometriosis     Fatty liver     Hyperlipidemia     Hypertension     2009    Nodular goiter (nontoxic)     1999    Special screening for malignant neoplasms, colon 10/28/2014     Past Surgical History:   Procedure Laterality Date    ABLATION OF DYSRHYTHMIC FOCUS      ASD REPAIR, SECUNDUM      CARPAL TUNNEL RELEASE  2007    right hand    COLONOSCOPY N/A 1/14/2020    Procedure: COLONOSCOPY;  Surgeon: Latrell Banks MD;  Location: [facility];  Service: Endoscopy;  Laterality: N/A;    EYE SURGERY  11/2019 12/2019 right eye     Heart ablation  2011    OOPHORECTOMY      Open Heart Surgery  1970    OVARIAN CYST SURGERY  1981     Review of patient's allergies indicates:   Allergen Reactions    No known drug allergies      Social History     Tobacco Use    Smoking status: Never    Smokeless tobacco: Never   Substance Use Topics    Alcohol use: Yes     Comment: occasional glass of red wine    Drug use: No     Family History   Problem Relation Age of Onset    Hypertension Mother     Arthritis Mother     Hearing loss Mother         cholesteatoma surgery    Cancer Father         colon    Diabetes Father     Breast cancer Sister     Cancer Sister         Breast    Aneurysm Brother         this was of his heart.    Birth defects Brother         Heart murmer    Heart disease Brother     Hypertension Brother     Cancer Brother         Prostate Cancer    Diabetes Brother     Diabetes Brother     Hypertension Brother     Diabetes Sister     Heart disease Sister     Hypertension Sister     Stroke Sister     Melanoma Neg Hx      Current Outpatient Medications on File Prior to Visit   Medication Sig Dispense Refill    ANTIOX #11/OM3/DHA/EPA/LUT/NAMAN (50+ ADULT EYE HEALTH ORAL) Take by mouth.      aspirin 81 MG chewable tablet Take 81 mg by mouth once daily.      cetirizine 10 mg Cap Zyrtec   1 po q day      furosemide (LASIX) 40 MG tablet Take 1 tablet (40 mg total) by mouth 3  "(three) times daily. (Patient taking differently: Take 40 mg by mouth 3 (three) times daily. 2 tablets in am 1 tablet nightly) 270 tablet 3    levothyroxine (SYNTHROID) 150 MCG tablet Take 1 tablet (150 mcg total) by mouth before breakfast. 90 tablet 0    MAGNESIUM ORAL Take by mouth once daily.      metoprolol succinate (TOPROL-XL) 100 MG 24 hr tablet Take 1 tablet (100 mg total) by mouth once daily. 90 tablet 3    mometasone (ELOCON) 0.1 % ointment aaa bid x 1-2 weeks then prn 45 g 0    potassium chloride SA (K-DUR,KLOR-CON) 20 MEQ tablet Take 1 tablet (20 mEq total) by mouth once daily. 90 tablet 3    rosuvastatin (CRESTOR) 10 MG tablet Take 1 tablet (10 mg total) by mouth once daily. 90 tablet 3     No current facility-administered medications on file prior to visit.       Review of Systems   Constitutional:  Negative for chills, diaphoresis, fatigue and fever.   HENT:  Positive for congestion, postnasal drip, sinus pressure, sinus pain and sore throat. Negative for ear pain.    Eyes:  Negative for pain and redness.   Respiratory:  Positive for cough. Negative for chest tightness and shortness of breath.    Cardiovascular:  Negative for chest pain and leg swelling.   Gastrointestinal:  Negative for abdominal pain, constipation, diarrhea, nausea and vomiting.   Genitourinary:  Negative for dysuria and hematuria.   Musculoskeletal:  Negative for arthralgias and joint swelling.   Skin:  Negative for rash.   Neurological:  Negative for dizziness, syncope and headaches.   Psychiatric/Behavioral:  Negative for dysphoric mood. The patient is not nervous/anxious.      Vitals:    04/24/23 1308   BP: 122/69   BP Location: Left arm   Patient Position: Sitting   Pulse: 75   Resp: 18   Temp: 99.6 °F (37.6 °C)   Weight: 80.3 kg (177 lb)   Height: 5' 2" (1.575 m)       Wt Readings from Last 3 Encounters:   04/24/23 80.3 kg (177 lb)   12/23/22 82.8 kg (182 lb 8.7 oz)   12/15/22 82.1 kg (181 lb)       Physical " Exam  Constitutional:       General: She is not in acute distress.     Appearance: Normal appearance. She is well-developed.   HENT:      Head: Normocephalic and atraumatic.      Right Ear: Tympanic membrane normal.      Left Ear: Tympanic membrane normal.      Nose: Congestion present.      Mouth/Throat:      Mouth: Mucous membranes are moist.      Pharynx: Posterior oropharyngeal erythema present.   Eyes:      Conjunctiva/sclera: Conjunctivae normal.   Cardiovascular:      Rate and Rhythm: Normal rate and regular rhythm.      Pulses: Normal pulses.      Heart sounds: Normal heart sounds. No murmur heard.  Pulmonary:      Effort: Pulmonary effort is normal. No respiratory distress.      Breath sounds: Normal breath sounds.   Abdominal:      General: Bowel sounds are normal. There is no distension.      Palpations: Abdomen is soft.      Tenderness: There is no abdominal tenderness.   Musculoskeletal:         General: Normal range of motion.      Cervical back: Normal range of motion and neck supple.   Lymphadenopathy:      Cervical: No cervical adenopathy.   Skin:     General: Skin is warm and dry.      Findings: No rash.   Neurological:      General: No focal deficit present.      Mental Status: She is alert and oriented to person, place, and time.   Psychiatric:         Mood and Affect: Mood normal.         Behavior: Behavior normal.       Health Maintenance   Topic Date Due    Mammogram  11/10/2023    DEXA Scan  10/27/2024    Lipid Panel  11/10/2027    TETANUS VACCINE  06/07/2028    Hepatitis C Screening  Completed

## 2023-05-12 ENCOUNTER — LAB VISIT (OUTPATIENT)
Dept: LAB | Facility: HOSPITAL | Age: 68
End: 2023-05-12
Attending: FAMILY MEDICINE
Payer: COMMERCIAL

## 2023-05-12 DIAGNOSIS — E03.9 HYPOTHYROIDISM, UNSPECIFIED TYPE: ICD-10-CM

## 2023-05-12 LAB — TSH SERPL DL<=0.005 MIU/L-ACNC: 2.25 UIU/ML (ref 0.4–4)

## 2023-05-12 PROCEDURE — 84443 ASSAY THYROID STIM HORMONE: CPT | Performed by: FAMILY MEDICINE

## 2023-05-12 PROCEDURE — 36415 COLL VENOUS BLD VENIPUNCTURE: CPT | Mod: PO | Performed by: FAMILY MEDICINE

## 2023-05-15 RX ORDER — LEVOTHYROXINE SODIUM 150 UG/1
150 TABLET ORAL
Qty: 90 TABLET | Refills: 3 | Status: SHIPPED | OUTPATIENT
Start: 2023-05-15 | End: 2023-12-06

## 2023-05-15 NOTE — PROGRESS NOTES
I have reviewed the labs and am recommending the following:  TSH NORMAL-The TSH screening indicated a normal function of the thyroid.    Health maintenance items that remain on your list that need to be arranged are listed below.   Please notify me if you are prepared to get them completed.    COVID-19 Vaccine(4 - Booster for Moderna series) due on 02/08/2022    Dr. Latrell Banks

## 2023-05-16 ENCOUNTER — OFFICE VISIT (OUTPATIENT)
Dept: DERMATOLOGY | Facility: CLINIC | Age: 68
End: 2023-05-16
Payer: COMMERCIAL

## 2023-05-16 DIAGNOSIS — L82.0 INFLAMED SEBORRHEIC KERATOSIS: Primary | ICD-10-CM

## 2023-05-16 DIAGNOSIS — Z12.83 SKIN CANCER SCREENING: ICD-10-CM

## 2023-05-16 DIAGNOSIS — L82.1 SEBORRHEIC KERATOSES: ICD-10-CM

## 2023-05-16 PROCEDURE — 1159F PR MEDICATION LIST DOCUMENTED IN MEDICAL RECORD: ICD-10-PCS | Mod: CPTII,S$GLB,, | Performed by: DERMATOLOGY

## 2023-05-16 PROCEDURE — 3044F HG A1C LEVEL LT 7.0%: CPT | Mod: CPTII,S$GLB,, | Performed by: DERMATOLOGY

## 2023-05-16 PROCEDURE — 3044F PR MOST RECENT HEMOGLOBIN A1C LEVEL <7.0%: ICD-10-PCS | Mod: CPTII,S$GLB,, | Performed by: DERMATOLOGY

## 2023-05-16 PROCEDURE — 3288F PR FALLS RISK ASSESSMENT DOCUMENTED: ICD-10-PCS | Mod: CPTII,S$GLB,, | Performed by: DERMATOLOGY

## 2023-05-16 PROCEDURE — 1160F PR REVIEW ALL MEDS BY PRESCRIBER/CLIN PHARMACIST DOCUMENTED: ICD-10-PCS | Mod: CPTII,S$GLB,, | Performed by: DERMATOLOGY

## 2023-05-16 PROCEDURE — 1101F PT FALLS ASSESS-DOCD LE1/YR: CPT | Mod: CPTII,S$GLB,, | Performed by: DERMATOLOGY

## 2023-05-16 PROCEDURE — 99213 PR OFFICE/OUTPT VISIT, EST, LEVL III, 20-29 MIN: ICD-10-PCS | Mod: 25,S$GLB,, | Performed by: DERMATOLOGY

## 2023-05-16 PROCEDURE — 1160F RVW MEDS BY RX/DR IN RCRD: CPT | Mod: CPTII,S$GLB,, | Performed by: DERMATOLOGY

## 2023-05-16 PROCEDURE — 1159F MED LIST DOCD IN RCRD: CPT | Mod: CPTII,S$GLB,, | Performed by: DERMATOLOGY

## 2023-05-16 PROCEDURE — 99999 PR PBB SHADOW E&M-EST. PATIENT-LVL III: ICD-10-PCS | Mod: PBBFAC,,, | Performed by: DERMATOLOGY

## 2023-05-16 PROCEDURE — 99213 OFFICE O/P EST LOW 20 MIN: CPT | Mod: 25,S$GLB,, | Performed by: DERMATOLOGY

## 2023-05-16 PROCEDURE — 3288F FALL RISK ASSESSMENT DOCD: CPT | Mod: CPTII,S$GLB,, | Performed by: DERMATOLOGY

## 2023-05-16 PROCEDURE — 17110 PR DESTRUCTION BENIGN LESIONS UP TO 14: ICD-10-PCS | Mod: S$GLB,,, | Performed by: DERMATOLOGY

## 2023-05-16 PROCEDURE — 17110 DESTRUCTION B9 LES UP TO 14: CPT | Mod: S$GLB,,, | Performed by: DERMATOLOGY

## 2023-05-16 PROCEDURE — 99999 PR PBB SHADOW E&M-EST. PATIENT-LVL III: CPT | Mod: PBBFAC,,, | Performed by: DERMATOLOGY

## 2023-05-16 PROCEDURE — 1101F PR PT FALLS ASSESS DOC 0-1 FALLS W/OUT INJ PAST YR: ICD-10-PCS | Mod: CPTII,S$GLB,, | Performed by: DERMATOLOGY

## 2023-05-16 NOTE — PROGRESS NOTES
Subjective:      Patient ID:  Mallorychiara David is a 67 y.o. female who presents for No chief complaint on file.    HPI  Patient here today for FBSC. Lov 11/2022 with Dr. Sanchez.  No other concerns today  no Phx of NMSC.  Fhx of NMSC  + Mother and Father hx of BCC  no Fhx of melanoma  Review of Systems   Constitutional:  Negative for fever, chills and fatigue.   Skin:  Positive for daily sunscreen use, activity-related sunscreen use and wears hat.   Hematologic/Lymphatic: Does not bruise/bleed easily.     Objective:   Physical Exam   Constitutional: She appears well-developed and well-nourished. No distress.   HENT:   Mouth/Throat: Lips normal.    Eyes: Lids are normal.  No conjunctival no injection.   Cardiovascular:  There is no local extremity swelling and no dependent edema.             Neurological: She is alert and oriented to person, place, and time. She is not disoriented.   Psychiatric: She has a normal mood and affect.   Skin:   Areas Examined (abnormalities noted in diagram):   Scalp / Hair Palpated and Inspected  Head / Face Inspection Performed  Neck Inspection Performed  Chest / Axilla Inspection Performed  Abdomen Inspection Performed  Genitals / Buttocks / Groin Inspection Performed  Back Inspection Performed  RUE Inspected  LUE Inspection Performed  RLE Inspected  LLE Inspection Performed  Nails and Digits Inspection Performed               Diagram Legend     Erythematous scaling macule/papule c/w actinic keratosis       Vascular papule c/w angioma      Pigmented verrucoid papule/plaque c/w seborrheic keratosis      Yellow umbilicated papule c/w sebaceous hyperplasia      Irregularly shaped tan macule c/w lentigo     1-2 mm smooth white papules consistent with Milia      Movable subcutaneous cyst with punctum c/w epidermal inclusion cyst      Subcutaneous movable cyst c/w pilar cyst      Firm pink to brown papule c/w dermatofibroma      Pedunculated fleshy papule(s) c/w skin tag(s)      Evenly  pigmented macule c/w junctional nevus     Mildly variegated pigmented, slightly irregular-bordered macule c/w mildly atypical nevus      Flesh colored to evenly pigmented papule c/w intradermal nevus       Pink pearly papule/plaque c/w basal cell carcinoma      Erythematous hyperkeratotic cursted plaque c/w SCC      Surgical scar with no sign of skin cancer recurrence      Open and closed comedones      Inflammatory papules and pustules      Verrucoid papule consistent consistent with wart     Erythematous eczematous patches and plaques     Dystrophic onycholytic nail with subungual debris c/w onychomycosis     Umbilicated papule    Erythematous-base heme-crusted tan verrucoid plaque consistent with inflamed seborrheic keratosis     Erythematous Silvery Scaling Plaque c/w Psoriasis     See annotation      Assessment / Plan:        Inflamed seborrheic keratosis  Cryosurgery procedure note:    Verbal consent from the patient is obtained. Liquid nitrogen cryosurgery is applied to 5 lesions to produce a freeze injury. The patient is aware that blisters may form and is instructed on wound care with gentle cleansing and use of vaseline ointment to keep moist until healed. The patient is supplied a handout on cryosurgery and is instructed to call if lesions do not completely resolve. Risk of dyspigmentation discussed.       Skin cancer screening  Upper body skin examination performed today including at least 6 points as noted in physical examination. No lesions suspicious for malignancy noted.    Seborrheic keratoses    These are benign inherited growths without a malignant potential. Reassurance given to patient. No treatment is necessary.            No follow-ups on file.

## 2023-06-14 ENCOUNTER — LAB VISIT (OUTPATIENT)
Dept: LAB | Facility: HOSPITAL | Age: 68
End: 2023-06-14
Attending: FAMILY MEDICINE
Payer: COMMERCIAL

## 2023-06-14 DIAGNOSIS — R73.09 ELEVATED GLUCOSE: ICD-10-CM

## 2023-06-14 LAB
ESTIMATED AVG GLUCOSE: 131 MG/DL (ref 68–131)
HBA1C MFR BLD: 6.2 % (ref 4–5.6)

## 2023-06-14 PROCEDURE — 36415 COLL VENOUS BLD VENIPUNCTURE: CPT | Mod: PO | Performed by: FAMILY MEDICINE

## 2023-06-14 PROCEDURE — 83036 HEMOGLOBIN GLYCOSYLATED A1C: CPT | Performed by: FAMILY MEDICINE

## 2023-07-21 ENCOUNTER — PATIENT OUTREACH (OUTPATIENT)
Dept: ADMINISTRATIVE | Facility: HOSPITAL | Age: 68
End: 2023-07-21
Payer: COMMERCIAL

## 2023-08-24 ENCOUNTER — TELEPHONE (OUTPATIENT)
Dept: FAMILY MEDICINE | Facility: CLINIC | Age: 68
End: 2023-08-24
Payer: COMMERCIAL

## 2023-08-24 ENCOUNTER — OFFICE VISIT (OUTPATIENT)
Dept: FAMILY MEDICINE | Facility: CLINIC | Age: 68
End: 2023-08-24
Payer: COMMERCIAL

## 2023-08-24 VITALS
HEIGHT: 62 IN | DIASTOLIC BLOOD PRESSURE: 76 MMHG | HEART RATE: 69 BPM | BODY MASS INDEX: 33.13 KG/M2 | SYSTOLIC BLOOD PRESSURE: 116 MMHG | OXYGEN SATURATION: 96 % | WEIGHT: 180 LBS

## 2023-08-24 DIAGNOSIS — J06.9 UPPER RESPIRATORY TRACT INFECTION, UNSPECIFIED TYPE: Primary | ICD-10-CM

## 2023-08-24 DIAGNOSIS — K86.2 CYST OF PANCREAS: Primary | ICD-10-CM

## 2023-08-24 DIAGNOSIS — R05.9 COUGH, UNSPECIFIED TYPE: ICD-10-CM

## 2023-08-24 LAB
CTP QC/QA: YES
CTP QC/QA: YES
POC MOLECULAR INFLUENZA A AGN: NEGATIVE
POC MOLECULAR INFLUENZA B AGN: NEGATIVE
SARS-COV-2 RDRP RESP QL NAA+PROBE: NEGATIVE

## 2023-08-24 PROCEDURE — 1101F PR PT FALLS ASSESS DOC 0-1 FALLS W/OUT INJ PAST YR: ICD-10-PCS | Mod: CPTII,S$GLB,, | Performed by: NURSE PRACTITIONER

## 2023-08-24 PROCEDURE — 3044F PR MOST RECENT HEMOGLOBIN A1C LEVEL <7.0%: ICD-10-PCS | Mod: CPTII,S$GLB,, | Performed by: NURSE PRACTITIONER

## 2023-08-24 PROCEDURE — 3078F DIAST BP <80 MM HG: CPT | Mod: CPTII,S$GLB,, | Performed by: NURSE PRACTITIONER

## 2023-08-24 PROCEDURE — 1126F PR PAIN SEVERITY QUANTIFIED, NO PAIN PRESENT: ICD-10-PCS | Mod: CPTII,S$GLB,, | Performed by: NURSE PRACTITIONER

## 2023-08-24 PROCEDURE — 1159F PR MEDICATION LIST DOCUMENTED IN MEDICAL RECORD: ICD-10-PCS | Mod: CPTII,S$GLB,, | Performed by: NURSE PRACTITIONER

## 2023-08-24 PROCEDURE — 1159F MED LIST DOCD IN RCRD: CPT | Mod: CPTII,S$GLB,, | Performed by: NURSE PRACTITIONER

## 2023-08-24 PROCEDURE — 3074F SYST BP LT 130 MM HG: CPT | Mod: CPTII,S$GLB,, | Performed by: NURSE PRACTITIONER

## 2023-08-24 PROCEDURE — 3008F PR BODY MASS INDEX (BMI) DOCUMENTED: ICD-10-PCS | Mod: CPTII,S$GLB,, | Performed by: NURSE PRACTITIONER

## 2023-08-24 PROCEDURE — 3288F FALL RISK ASSESSMENT DOCD: CPT | Mod: CPTII,S$GLB,, | Performed by: NURSE PRACTITIONER

## 2023-08-24 PROCEDURE — 99213 OFFICE O/P EST LOW 20 MIN: CPT | Mod: S$GLB,,, | Performed by: NURSE PRACTITIONER

## 2023-08-24 PROCEDURE — 99999 PR PBB SHADOW E&M-EST. PATIENT-LVL V: ICD-10-PCS | Mod: PBBFAC,,, | Performed by: NURSE PRACTITIONER

## 2023-08-24 PROCEDURE — 1126F AMNT PAIN NOTED NONE PRSNT: CPT | Mod: CPTII,S$GLB,, | Performed by: NURSE PRACTITIONER

## 2023-08-24 PROCEDURE — 99999 PR PBB SHADOW E&M-EST. PATIENT-LVL V: CPT | Mod: PBBFAC,,, | Performed by: NURSE PRACTITIONER

## 2023-08-24 PROCEDURE — 3288F PR FALLS RISK ASSESSMENT DOCUMENTED: ICD-10-PCS | Mod: CPTII,S$GLB,, | Performed by: NURSE PRACTITIONER

## 2023-08-24 PROCEDURE — 1160F RVW MEDS BY RX/DR IN RCRD: CPT | Mod: CPTII,S$GLB,, | Performed by: NURSE PRACTITIONER

## 2023-08-24 PROCEDURE — 1101F PT FALLS ASSESS-DOCD LE1/YR: CPT | Mod: CPTII,S$GLB,, | Performed by: NURSE PRACTITIONER

## 2023-08-24 PROCEDURE — 3008F BODY MASS INDEX DOCD: CPT | Mod: CPTII,S$GLB,, | Performed by: NURSE PRACTITIONER

## 2023-08-24 PROCEDURE — 87502 INFLUENZA DNA AMP PROBE: CPT | Mod: QW,S$GLB,, | Performed by: NURSE PRACTITIONER

## 2023-08-24 PROCEDURE — 3044F HG A1C LEVEL LT 7.0%: CPT | Mod: CPTII,S$GLB,, | Performed by: NURSE PRACTITIONER

## 2023-08-24 PROCEDURE — 1160F PR REVIEW ALL MEDS BY PRESCRIBER/CLIN PHARMACIST DOCUMENTED: ICD-10-PCS | Mod: CPTII,S$GLB,, | Performed by: NURSE PRACTITIONER

## 2023-08-24 PROCEDURE — 99213 PR OFFICE/OUTPT VISIT, EST, LEVL III, 20-29 MIN: ICD-10-PCS | Mod: S$GLB,,, | Performed by: NURSE PRACTITIONER

## 2023-08-24 PROCEDURE — 87502 POCT INFLUENZA A/B MOLECULAR: ICD-10-PCS | Mod: QW,S$GLB,, | Performed by: NURSE PRACTITIONER

## 2023-08-24 PROCEDURE — 3074F PR MOST RECENT SYSTOLIC BLOOD PRESSURE < 130 MM HG: ICD-10-PCS | Mod: CPTII,S$GLB,, | Performed by: NURSE PRACTITIONER

## 2023-08-24 PROCEDURE — 87635 SARS-COV-2 COVID-19 AMP PRB: CPT | Mod: QW,S$GLB,, | Performed by: NURSE PRACTITIONER

## 2023-08-24 PROCEDURE — 87635: ICD-10-PCS | Mod: QW,S$GLB,, | Performed by: NURSE PRACTITIONER

## 2023-08-24 PROCEDURE — 3078F PR MOST RECENT DIASTOLIC BLOOD PRESSURE < 80 MM HG: ICD-10-PCS | Mod: CPTII,S$GLB,, | Performed by: NURSE PRACTITIONER

## 2023-08-24 RX ORDER — CEFDINIR 300 MG/1
300 CAPSULE ORAL 2 TIMES DAILY
Qty: 20 CAPSULE | Refills: 0 | Status: SHIPPED | OUTPATIENT
Start: 2023-08-24 | End: 2023-09-03

## 2023-08-24 RX ORDER — PROMETHAZINE HYDROCHLORIDE AND DEXTROMETHORPHAN HYDROBROMIDE 6.25; 15 MG/5ML; MG/5ML
5 SYRUP ORAL 2 TIMES DAILY PRN
Qty: 118 ML | Refills: 0 | Status: SHIPPED | OUTPATIENT
Start: 2023-08-24 | End: 2023-09-03

## 2023-08-24 NOTE — PATIENT INSTRUCTIONS
Hydrate well  Rest  Cough medication causes drowsiness  Report to ER immediately if symptoms worsen or persist

## 2023-08-24 NOTE — TELEPHONE ENCOUNTER
Please schedule. Pt states previous GI no longer with [facility]; requesting GI in [place]   Low Suspicion of COVID-19

## 2023-08-24 NOTE — PROGRESS NOTES
Subjective     Patient ID: Mallorykamilah David is a 67 y.o. female.    Chief Complaint: Cough and Sinus Problem (Started Tuesday, started mucinex and cough meds)    Cough  This is a new problem. The current episode started 1 to 4 weeks ago. The problem has been gradually worsening. The problem occurs every few minutes. The cough is Productive of sputum. Associated symptoms include nasal congestion, postnasal drip and rhinorrhea. Pertinent negatives include no chest pain, chills, ear congestion, ear pain, fever, headaches, heartburn, hemoptysis, myalgias, rash, sore throat, shortness of breath, sweats, weight loss or wheezing. Nothing aggravates the symptoms. Treatments tried: mucinex. The treatment provided no relief. Her past medical history is significant for bronchitis. There is no history of asthma, bronchiectasis, COPD, emphysema, environmental allergies or pneumonia.     Past Medical History:   Diagnosis Date    *Atrial flutter     s/p ablation    Abnormal Pap smear of cervix     ASD (atrial septal defect), ostium secundum     repaired    Benign hypertensive heart disease without heart failure     Endometriosis     Fatty liver     Hyperlipidemia     Hypertension     2009    Nodular goiter (nontoxic)     1999    Special screening for malignant neoplasms, colon 10/28/2014     Social History     Socioeconomic History    Marital status:      Spouse name: Alexi    Number of children: 2   Occupational History    Occupation:      Employer: [employer]   Tobacco Use    Smoking status: Never    Smokeless tobacco: Never   Substance and Sexual Activity    Alcohol use: Yes     Comment: occasional glass of red wine    Drug use: No    Sexual activity: Not Currently     Partners: Male     Birth control/protection: Post-menopausal     Social Determinants of Health     Financial Resource Strain: Low Risk  (11/8/2022)    Overall Financial Resource Strain (CARDIA)     Difficulty of Paying Living Expenses: Not  hard at all   Food Insecurity: No Food Insecurity (11/8/2022)    Hunger Vital Sign     Worried About Running Out of Food in the Last Year: Never true     Ran Out of Food in the Last Year: Never true   Transportation Needs: No Transportation Needs (11/8/2022)    PRAPARE - Transportation     Lack of Transportation (Medical): No     Lack of Transportation (Non-Medical): No   Physical Activity: Insufficiently Active (11/8/2022)    Exercise Vital Sign     Days of Exercise per Week: 3 days     Minutes of Exercise per Session: 40 min   Stress: No Stress Concern Present (11/8/2022)    [place] [employer] - Occupational Stress Questionnaire     Feeling of Stress : Only a little   Social Connections: Unknown (11/8/2022)    Social Connection and Isolation Panel [NHANES]     Frequency of Communication with Friends and Family: More than three times a week     Frequency of Social Gatherings with Friends and Family: Once a week     Active Member of Clubs or Organizations: Yes     Attends Club or Organization Meetings: 1 to 4 times per year     Marital Status:    Housing Stability: Unknown (11/8/2022)    Housing Stability Vital Sign     Unable to Pay for Housing in the Last Year: No     Unstable Housing in the Last Year: No     Past Surgical History:   Procedure Laterality Date    ABLATION OF DYSRHYTHMIC FOCUS      ASD REPAIR, SECUNDUM      CARPAL TUNNEL RELEASE  2007    right hand    COLONOSCOPY N/A 1/14/2020    Procedure: COLONOSCOPY;  Surgeon: Latrell Banks MD;  Location: [facility];  Service: Endoscopy;  Laterality: N/A;    EYE SURGERY  11/2019 12/2019 right eye     Heart ablation  2011    OOPHORECTOMY      Open Heart Surgery  1970    OVARIAN CYST SURGERY  1981       Review of Systems   Constitutional: Negative.  Negative for chills, fever and weight loss.   HENT:  Positive for postnasal drip, rhinorrhea and sinus pressure/congestion. Negative for ear pain and sore throat.    Eyes: Negative.     Respiratory:  Positive for cough. Negative for hemoptysis, shortness of breath and wheezing.    Cardiovascular: Negative.  Negative for chest pain.   Gastrointestinal: Negative.  Negative for heartburn.   Endocrine: Negative.    Genitourinary: Negative.    Musculoskeletal: Negative.  Negative for myalgias.   Integumentary:  Negative for rash. Negative.   Allergic/Immunologic: Negative.  Negative for environmental allergies.   Neurological: Negative.  Negative for headaches.   Psychiatric/Behavioral: Negative.            Objective     Physical Exam  Vitals and nursing note reviewed.   Constitutional:       Appearance: Normal appearance.   HENT:      Head: Normocephalic.      Right Ear: Tympanic membrane, ear canal and external ear normal.      Left Ear: Tympanic membrane and ear canal normal.      Nose: Congestion and rhinorrhea present.      Mouth/Throat:      Mouth: Mucous membranes are moist.      Pharynx: Oropharynx is clear.   Eyes:      Conjunctiva/sclera: Conjunctivae normal.      Pupils: Pupils are equal, round, and reactive to light.   Cardiovascular:      Rate and Rhythm: Normal rate and regular rhythm.      Pulses: Normal pulses.      Heart sounds: Normal heart sounds.   Pulmonary:      Effort: Pulmonary effort is normal.      Breath sounds: Normal breath sounds.   Abdominal:      General: Bowel sounds are normal.      Palpations: Abdomen is soft.   Musculoskeletal:         General: Normal range of motion.      Cervical back: Normal range of motion and neck supple.   Skin:     General: Skin is warm and dry.      Capillary Refill: Capillary refill takes 2 to 3 seconds.   Neurological:      Mental Status: She is alert and oriented to person, place, and time.   Psychiatric:         Mood and Affect: Mood normal.         Behavior: Behavior normal.         Thought Content: Thought content normal.         Judgment: Judgment normal.            Assessment and Plan     1. Upper respiratory tract infection,  unspecified type  2. Cough, unspecified type  -     POCT COVID-19 Rapid Screening  -     POCT Influenza A/B Molecular        -     promethazine-dextromethorphan (PROMETHAZINE-DM) 6.25-15 mg/5 mL Syrp; Take 5 mLs by mouth 2 (two) times daily as needed.  Dispense: 118 mL; Refill: 0  -     cefdinir (OMNICEF) 300 MG capsule; Take 1 capsule (300 mg total) by mouth 2 (two) times daily. for 10 days  Dispense: 20 capsule; Refill: 0  Hydrate well  Rest  Report to ER immediately if symptoms worsen or persist                No follow-ups on file.

## 2023-10-05 ENCOUNTER — HOSPITAL ENCOUNTER (OUTPATIENT)
Dept: RADIOLOGY | Facility: HOSPITAL | Age: 68
Discharge: HOME | End: 2023-10-05
Attending: NURSE PRACTITIONER
Payer: COMMERCIAL

## 2023-10-05 ENCOUNTER — OFFICE VISIT (OUTPATIENT)
Dept: FAMILY MEDICINE | Facility: CLINIC | Age: 68
End: 2023-10-05
Payer: COMMERCIAL

## 2023-10-05 VITALS
BODY MASS INDEX: 31.44 KG/M2 | SYSTOLIC BLOOD PRESSURE: 113 MMHG | OXYGEN SATURATION: 98 % | HEART RATE: 78 BPM | TEMPERATURE: 97 F | WEIGHT: 171.88 LBS | DIASTOLIC BLOOD PRESSURE: 69 MMHG

## 2023-10-05 DIAGNOSIS — J32.9 SINUSITIS, UNSPECIFIED CHRONICITY, UNSPECIFIED LOCATION: ICD-10-CM

## 2023-10-05 DIAGNOSIS — R05.9 COUGH, UNSPECIFIED TYPE: ICD-10-CM

## 2023-10-05 DIAGNOSIS — J32.9 SINUSITIS, UNSPECIFIED CHRONICITY, UNSPECIFIED LOCATION: Primary | ICD-10-CM

## 2023-10-05 DIAGNOSIS — I47.10 PAROXYSMAL SUPRAVENTRICULAR TACHYCARDIA: ICD-10-CM

## 2023-10-05 PROCEDURE — 71046 X-RAY EXAM CHEST 2 VIEWS: CPT | Mod: TC,PO

## 2023-10-05 PROCEDURE — 3008F PR BODY MASS INDEX (BMI) DOCUMENTED: ICD-10-PCS | Mod: CPTII,S$GLB,, | Performed by: NURSE PRACTITIONER

## 2023-10-05 PROCEDURE — 99213 OFFICE O/P EST LOW 20 MIN: CPT | Mod: S$GLB,,, | Performed by: NURSE PRACTITIONER

## 2023-10-05 PROCEDURE — 99213 PR OFFICE/OUTPT VISIT, EST, LEVL III, 20-29 MIN: ICD-10-PCS | Mod: S$GLB,,, | Performed by: NURSE PRACTITIONER

## 2023-10-05 PROCEDURE — 1160F RVW MEDS BY RX/DR IN RCRD: CPT | Mod: CPTII,S$GLB,, | Performed by: NURSE PRACTITIONER

## 2023-10-05 PROCEDURE — 1160F PR REVIEW ALL MEDS BY PRESCRIBER/CLIN PHARMACIST DOCUMENTED: ICD-10-PCS | Mod: CPTII,S$GLB,, | Performed by: NURSE PRACTITIONER

## 2023-10-05 PROCEDURE — 70220 X-RAY EXAM OF SINUSES: CPT | Mod: 26,,, | Performed by: RADIOLOGY

## 2023-10-05 PROCEDURE — 71046 X-RAY EXAM CHEST 2 VIEWS: CPT | Mod: 26,,, | Performed by: RADIOLOGY

## 2023-10-05 PROCEDURE — 99999 PR PBB SHADOW E&M-EST. PATIENT-LVL V: ICD-10-PCS | Mod: PBBFAC,,, | Performed by: NURSE PRACTITIONER

## 2023-10-05 PROCEDURE — 3074F PR MOST RECENT SYSTOLIC BLOOD PRESSURE < 130 MM HG: ICD-10-PCS | Mod: CPTII,S$GLB,, | Performed by: NURSE PRACTITIONER

## 2023-10-05 PROCEDURE — 1101F PR PT FALLS ASSESS DOC 0-1 FALLS W/OUT INJ PAST YR: ICD-10-PCS | Mod: CPTII,S$GLB,, | Performed by: NURSE PRACTITIONER

## 2023-10-05 PROCEDURE — 3044F HG A1C LEVEL LT 7.0%: CPT | Mod: CPTII,S$GLB,, | Performed by: NURSE PRACTITIONER

## 2023-10-05 PROCEDURE — 3044F PR MOST RECENT HEMOGLOBIN A1C LEVEL <7.0%: ICD-10-PCS | Mod: CPTII,S$GLB,, | Performed by: NURSE PRACTITIONER

## 2023-10-05 PROCEDURE — 1101F PT FALLS ASSESS-DOCD LE1/YR: CPT | Mod: CPTII,S$GLB,, | Performed by: NURSE PRACTITIONER

## 2023-10-05 PROCEDURE — 3288F PR FALLS RISK ASSESSMENT DOCUMENTED: ICD-10-PCS | Mod: CPTII,S$GLB,, | Performed by: NURSE PRACTITIONER

## 2023-10-05 PROCEDURE — 99999 PR PBB SHADOW E&M-EST. PATIENT-LVL V: CPT | Mod: PBBFAC,,, | Performed by: NURSE PRACTITIONER

## 2023-10-05 PROCEDURE — 70220 XR SINUSES MIN 3 VIEWS: ICD-10-PCS | Mod: 26,,, | Performed by: RADIOLOGY

## 2023-10-05 PROCEDURE — 1126F PR PAIN SEVERITY QUANTIFIED, NO PAIN PRESENT: ICD-10-PCS | Mod: CPTII,S$GLB,, | Performed by: NURSE PRACTITIONER

## 2023-10-05 PROCEDURE — 1126F AMNT PAIN NOTED NONE PRSNT: CPT | Mod: CPTII,S$GLB,, | Performed by: NURSE PRACTITIONER

## 2023-10-05 PROCEDURE — 71046 XR CHEST PA AND LATERAL: ICD-10-PCS | Mod: 26,,, | Performed by: RADIOLOGY

## 2023-10-05 PROCEDURE — 1159F MED LIST DOCD IN RCRD: CPT | Mod: CPTII,S$GLB,, | Performed by: NURSE PRACTITIONER

## 2023-10-05 PROCEDURE — 70220 X-RAY EXAM OF SINUSES: CPT | Mod: TC,PO

## 2023-10-05 PROCEDURE — 3078F DIAST BP <80 MM HG: CPT | Mod: CPTII,S$GLB,, | Performed by: NURSE PRACTITIONER

## 2023-10-05 PROCEDURE — 3078F PR MOST RECENT DIASTOLIC BLOOD PRESSURE < 80 MM HG: ICD-10-PCS | Mod: CPTII,S$GLB,, | Performed by: NURSE PRACTITIONER

## 2023-10-05 PROCEDURE — 3008F BODY MASS INDEX DOCD: CPT | Mod: CPTII,S$GLB,, | Performed by: NURSE PRACTITIONER

## 2023-10-05 PROCEDURE — 1159F PR MEDICATION LIST DOCUMENTED IN MEDICAL RECORD: ICD-10-PCS | Mod: CPTII,S$GLB,, | Performed by: NURSE PRACTITIONER

## 2023-10-05 PROCEDURE — 3074F SYST BP LT 130 MM HG: CPT | Mod: CPTII,S$GLB,, | Performed by: NURSE PRACTITIONER

## 2023-10-05 PROCEDURE — 3288F FALL RISK ASSESSMENT DOCD: CPT | Mod: CPTII,S$GLB,, | Performed by: NURSE PRACTITIONER

## 2023-10-05 RX ORDER — MONTELUKAST SODIUM 10 MG/1
10 TABLET ORAL NIGHTLY
Qty: 30 TABLET | Refills: 0 | Status: SHIPPED | OUTPATIENT
Start: 2023-10-05 | End: 2023-11-04

## 2023-10-05 RX ORDER — PROMETHAZINE HYDROCHLORIDE AND DEXTROMETHORPHAN HYDROBROMIDE 6.25; 15 MG/5ML; MG/5ML
5 SYRUP ORAL 2 TIMES DAILY PRN
Qty: 240 ML | Refills: 0 | Status: SHIPPED | OUTPATIENT
Start: 2023-10-05 | End: 2023-10-15

## 2023-10-05 NOTE — PROGRESS NOTES
"Subjective     Patient ID: Mallorychiara David is a 68 y.o. female.    Chief Complaint: Cough    Cough  This is a recurrent (Pt states resolved follwing treatment in August; states recurred on Tuesday) problem. The current episode started in the past 7 days. The problem has been unchanged. The problem occurs every few minutes. The cough is Productive of sputum. Associated symptoms include nasal congestion and postnasal drip. Pertinent negatives include no chest pain, chills, ear congestion, ear pain, fever, headaches, heartburn, hemoptysis, myalgias, rash, rhinorrhea, sore throat, shortness of breath, sweats, weight loss or wheezing. Associated symptoms comments: Pt states, "it feels like it's getting caught in my throat and it's worse when I wake up in the morning.". Nothing aggravates the symptoms. Treatments tried: Pt states, "I got abx in August, then I had to get augmentin for an eye infection last month, so I just had abx." The treatment provided moderate relief. Her past medical history is significant for bronchitis. There is no history of asthma, bronchiectasis, COPD, emphysema, environmental allergies or pneumonia.     Past Medical History:   Diagnosis Date    *Atrial flutter     s/p ablation    Abnormal Pap smear of cervix     ASD (atrial septal defect), ostium secundum     repaired    Benign hypertensive heart disease without heart failure     Endometriosis     Fatty liver     Hyperlipidemia     Hypertension     2009    Nodular goiter (nontoxic)     1999    Special screening for malignant neoplasms, colon 10/28/2014     Social History     Socioeconomic History    Marital status:      Spouse name: Alexi    Number of children: 2   Occupational History    Occupation:      Employer: [employer]   Tobacco Use    Smoking status: Never    Smokeless tobacco: Never   Substance and Sexual Activity    Alcohol use: Yes     Comment: occasional glass of red wine    Drug use: No    Sexual activity: " Not Currently     Partners: Male     Birth control/protection: Post-menopausal     Social Determinants of Health     Financial Resource Strain: Low Risk  (10/4/2023)    Overall Financial Resource Strain (CARDIA)     Difficulty of Paying Living Expenses: Not hard at all   Food Insecurity: No Food Insecurity (10/4/2023)    Hunger Vital Sign     Worried About Running Out of Food in the Last Year: Never true     Ran Out of Food in the Last Year: Never true   Transportation Needs: No Transportation Needs (10/4/2023)    PRAPARE - Transportation     Lack of Transportation (Medical): No     Lack of Transportation (Non-Medical): No   Physical Activity: Insufficiently Active (10/4/2023)    Exercise Vital Sign     Days of Exercise per Week: 4 days     Minutes of Exercise per Session: 30 min   Stress: No Stress Concern Present (10/4/2023)    [place] [employer] - Occupational Stress Questionnaire     Feeling of Stress : Only a little   Social Connections: Unknown (10/4/2023)    Social Connection and Isolation Panel [NHANES]     Frequency of Communication with Friends and Family: Three times a week     Frequency of Social Gatherings with Friends and Family: Once a week     Active Member of Clubs or Organizations: Yes     Attends Club or Organization Meetings: 1 to 4 times per year     Marital Status:    Housing Stability: Low Risk  (10/4/2023)    Housing Stability Vital Sign     Unable to Pay for Housing in the Last Year: No     Number of Places Lived in the Last Year: 1     Unstable Housing in the Last Year: No     Past Surgical History:   Procedure Laterality Date    ABLATION OF DYSRHYTHMIC FOCUS      ASD REPAIR, SECUNDUM      CARPAL TUNNEL RELEASE  2007    right hand    COLONOSCOPY N/A 1/14/2020    Procedure: COLONOSCOPY;  Surgeon: Latrell Banks MD;  Location: [facility];  Service: Endoscopy;  Laterality: N/A;    EYE SURGERY  11/2019 12/2019 right eye     Heart ablation  2011    OOPHORECTOMY       Open Heart Surgery  1970    OVARIAN CYST SURGERY  1981       Review of Systems   Constitutional: Negative.  Negative for chills, fever and weight loss.   HENT:  Positive for postnasal drip and sinus pressure/congestion. Negative for ear pain, rhinorrhea and sore throat.    Eyes: Negative.    Respiratory:  Positive for cough. Negative for hemoptysis, shortness of breath and wheezing.    Cardiovascular: Negative.  Negative for chest pain.   Gastrointestinal: Negative.  Negative for heartburn.   Endocrine: Negative.    Genitourinary: Negative.    Musculoskeletal: Negative.  Negative for myalgias.   Integumentary:  Negative for rash. Negative.   Allergic/Immunologic: Negative.  Negative for environmental allergies.   Neurological: Negative.  Negative for headaches.   Psychiatric/Behavioral: Negative.            Objective     Physical Exam  Vitals and nursing note reviewed.   Constitutional:       Appearance: Normal appearance.   HENT:      Head: Normocephalic.      Right Ear: Hearing, tympanic membrane, ear canal and external ear normal.      Left Ear: Hearing, tympanic membrane, ear canal and external ear normal.      Nose: Congestion and rhinorrhea present.      Mouth/Throat:      Mouth: Mucous membranes are moist.      Pharynx: Oropharynx is clear.   Eyes:      Conjunctiva/sclera: Conjunctivae normal.      Pupils: Pupils are equal, round, and reactive to light.   Cardiovascular:      Rate and Rhythm: Normal rate and regular rhythm.      Pulses: Normal pulses.      Heart sounds: Normal heart sounds.   Pulmonary:      Effort: Pulmonary effort is normal.      Breath sounds: Normal breath sounds.   Abdominal:      General: Bowel sounds are normal.      Palpations: Abdomen is soft.   Musculoskeletal:         General: Normal range of motion.      Cervical back: Normal range of motion and neck supple.   Skin:     General: Skin is warm and dry.      Capillary Refill: Capillary refill takes 2 to 3 seconds.   Neurological:       Mental Status: She is alert and oriented to person, place, and time.   Psychiatric:         Mood and Affect: Mood normal.         Behavior: Behavior normal.         Thought Content: Thought content normal.         Judgment: Judgment normal.            Assessment and Plan     1. Sinusitis, unspecified chronicity, unspecified location  2. Cough, unspecified type  Comments:  recurrent  -     Ambulatory referral/consult to ENT; Future; Expected date: 10/12/2023  -     X-Ray Sinuses 3 or more views; Future; Expected date: 10/05/2023         -     X-Ray Chest PA And Lateral; Future; Expected date: 10/05/2023  -     D-DIMER, QUANTITATIVE; Future; Expected date: 10/05/2023         -     montelukast (SINGULAIR) 10 mg tablet; Take 1 tablet (10 mg total) by mouth every evening.  Dispense: 30 tablet; Refill: 0  -     promethazine-dextromethorphan (PROMETHAZINE-DM) 6.25-15 mg/5 mL Syrp; Take 5 mLs by mouth 2 (two) times daily as needed.  Dispense: 240 mL; Refill: 0  Hydrate well  Rest  Report to ER immediately if symptoms worsen or persist               No follow-ups on file.

## 2023-10-05 NOTE — PATIENT INSTRUCTIONS
"Hydrate well  Rest  Singulair causes drowsiness  Cough medication causes drowsiness  Allow at least 6-8 h between singulair and cough med  Report to ER immediately if symptoms worsen or persist    Montelukast: Patient drug information  Access [employer] for additional drug information, tools, and databases.  Copyright 6917-8557 [employer] All rights reserved.  Contributor Disclosures  (For additional information see "Montelukast: Drug information" and see "Montelukast: Pediatric drug information")    You must carefully read the "Consumer Information Use and Disclaimer" below in order to understand and correctly use this information.  Brand Names: US  Singulair    Brand Names: Obdulia  ACH-Montelukast;     AG-Montelukast;     APO-Montelukast;     Auro-Montelukast;     BIO-Montelukast;     DOM-Montelukast FC;     JAMP-Montelukast;     M-Montelukast;     Mar-Montelukast;     MINT-Montelukast;     RANDELL-Montelukast;     NRA-Montelukast;     PMS-Montelukast;     PRIVA-Montelukast [DSC];     YOLANDA-Montelukast FC;     SANDOZ Montelukast;     Singulair;     TARO-Montelukast;     TEVA-Montelukast    Warning  For all uses of this drug:  Severe mental health problems have happened with this drug or after it has been stopped. These problems include depression or suicidal thoughts or actions. These problems can happen in people with and without a history of mental or mood problems. Watch people who take this drug closely. Call the doctor right away if any changes in mood or behavior that are new or worse occur. This includes signs like depression; thoughts or actions of suicide; not able to focus; bad or strange dreams; anxiety; hallucinations (seeing or hearing things that are not there); memory problems; sleep walking; stuttering; shakiness; trouble sleeping; trouble controlling body movements; or feeling agitated, confused, irritable, or restless. Talk with the doctor to be sure that the benefits of this drug are more " than the risks.  For allergic reasons:  This drug must only be used when other drugs cannot be used or have not worked. If you have questions, talk with the doctor.    What is this drug used for?  It is used to prevent breathing problems that happen with exercise.  It is used to treat or prevent asthma.  It is used to ease allergy signs.  It may be given to you for other reasons. Talk with the doctor.  For breathing problems:  This drug is not to be used to treat intense flare-ups of shortness of breath. Use a rescue inhaler. Talk with the doctor.    What do I need to tell my doctor BEFORE I take this drug?  If you are allergic to this drug; any part of this drug; or any other drugs, foods, or substances. Tell your doctor about the allergy and what signs you had.  This drug may interact with other drugs or health problems.  Tell your doctor and pharmacist about all of your drugs (prescription or OTC, natural products, vitamins) and health problems. You must check to make sure that it is safe for you to take this drug with all of your drugs and health problems. Do not start, stop, or change the dose of any drug without checking with your doctor.    What are some things I need to know or do while I take this drug?  For all uses of this drug:  Tell all of your health care providers that you take this drug. This includes your doctors, nurses, pharmacists, and dentists.  If you have phenylketonuria (PKU), talk with your doctor. Some products have phenylalanine.  Tell your doctor if you are pregnant, plan on getting pregnant, or are breast-feeding. You will need to talk about the benefits and risks to you and the baby.  For breathing problems:  Call your doctor right away if your breathing problems get worse, if your rescue inhaler does not work as well, or if you need to use your rescue inhaler more often.  If you take this drug for asthma or allergy, do not take another dose to prevent breathing problems that happen  with exercise.  If you have asthma and taking aspirin makes it worse, avoid aspirin and nonsteroidal anti-inflammatory drugs (NSAIDs) like ibuprofen and naproxen while you take this drug.  If you are switching to this drug from a steroid, do not stop taking the steroid all of a sudden. The dose of the steroid may need to be slowly lowered to avoid side effects. Talk with the doctor.    What are some side effects that I need to call my doctor about right away?  WARNING/CAUTION: Even though it may be rare, some people may have very bad and sometimes deadly side effects when taking a drug. Tell your doctor or get medical help right away if you have any of the following signs or symptoms that may be related to a very bad side effect:  Signs of an allergic reaction, like rash; hives; itching; red, swollen, blistered, or peeling skin with or without fever; wheezing; tightness in the chest or throat; trouble breathing, swallowing, or talking; unusual hoarseness; or swelling of the mouth, face, lips, tongue, or throat.  Signs of a very bad skin reaction (Prakash-Cornelio syndrome/toxic epidermal necrolysis) like red, swollen, blistered, or peeling skin (with or without fever); red or irritated eyes; or sores in the mouth, throat, nose, or eyes.  Trouble breathing that is new or worse.  Fever.  Flu-like signs.  Sinus pain.  A burning, numbness, or tingling feeling that is not normal.  Ear pain.    What are some other side effects of this drug?  All drugs may cause side effects. However, many people have no side effects or only have minor side effects. Call your doctor or get medical help if any of these side effects or any other side effects bother you or do not go away:  Headache.  Stomach pain or diarrhea.  Signs of a common cold.  Cough.  These are not all of the side effects that may occur. If you have questions about side effects, call your doctor. Call your doctor for medical advice about side effects.  You may report  side effects to your national health agency.    How is this drug best taken?  Use this drug as ordered by your doctor. Read all information given to you. Follow all instructions closely.  All products:  Take with or without food.  Keep taking this drug even when you are not having symptoms.  If taking this drug to prevent breathing problems that happen with exercise, take at least 2 hours before exercise.  If this drug is for asthma, take in the evening.  Chewable tablets:  Chew well before swallowing.  Granules:  Granules may be placed right in the mouth or mixed with cold or room temperature applesauce, baby formula, breast milk, mashed carrots, rice, or ice cream. Do not mix granules in other liquids.  Do not open the packet until you are ready to take a dose. Take your dose within 15 minutes after opening the packet. Do not store for future use.    What do I do if I miss a dose?  Skip the missed dose and go back to your normal time.  Do not take 2 doses at the same time or extra doses.    How do I store and/or throw out this drug?  Store at room temperature protected from light. Store in a dry place. Do not store in a bathroom.  Store in original container.  Keep all drugs in a safe place. Keep all drugs out of the reach of children and pets.  Throw away unused or  drugs. Do not flush down a toilet or pour down a drain unless you are told to do so. Check with your pharmacist if you have questions about the best way to throw out drugs. There may be drug take-back programs in your area.    General drug facts  If your symptoms or health problems do not get better or if they become worse, call your doctor.  Do not share your drugs with others and do not take anyone else's drugs.  Some drugs may have another patient information leaflet. If you have any questions about this drug, please talk with your doctor, nurse, pharmacist, or other health care provider.  If you think there has been an overdose, call your  poison control center or get medical care right away. Be ready to tell or show what was taken, how much, and when it happened.    Wesley Tejada,     If you are due for any health screening(s) below please notify me so we can arrange them to be ordered and scheduled. Most healthy patients at your age complete them, but you are free to accept or refuse.     If you can't do it, I'll definitely understand. If you can, I'd certainly appreciate it!    All of your core healthy metrics are met.

## 2023-10-10 ENCOUNTER — OFFICE VISIT (OUTPATIENT)
Dept: OTOLARYNGOLOGY | Facility: CLINIC | Age: 68
End: 2023-10-10
Payer: COMMERCIAL

## 2023-10-10 VITALS — BODY MASS INDEX: 31.89 KG/M2 | WEIGHT: 173.31 LBS | HEIGHT: 62 IN

## 2023-10-10 DIAGNOSIS — R05.9 COUGH, UNSPECIFIED TYPE: Primary | ICD-10-CM

## 2023-10-10 DIAGNOSIS — R09.82 POST-NASAL DRIP: ICD-10-CM

## 2023-10-10 PROCEDURE — 3288F PR FALLS RISK ASSESSMENT DOCUMENTED: ICD-10-PCS | Mod: CPTII,S$GLB,, | Performed by: OTOLARYNGOLOGY

## 2023-10-10 PROCEDURE — 1160F RVW MEDS BY RX/DR IN RCRD: CPT | Mod: CPTII,S$GLB,, | Performed by: OTOLARYNGOLOGY

## 2023-10-10 PROCEDURE — 1101F PR PT FALLS ASSESS DOC 0-1 FALLS W/OUT INJ PAST YR: ICD-10-PCS | Mod: CPTII,S$GLB,, | Performed by: OTOLARYNGOLOGY

## 2023-10-10 PROCEDURE — 1126F AMNT PAIN NOTED NONE PRSNT: CPT | Mod: CPTII,S$GLB,, | Performed by: OTOLARYNGOLOGY

## 2023-10-10 PROCEDURE — 31231 PR NASAL ENDOSCOPY, DX: ICD-10-PCS | Mod: S$GLB,,, | Performed by: OTOLARYNGOLOGY

## 2023-10-10 PROCEDURE — 99999 PR PBB SHADOW E&M-EST. PATIENT-LVL IV: CPT | Mod: PBBFAC,,, | Performed by: OTOLARYNGOLOGY

## 2023-10-10 PROCEDURE — 3008F BODY MASS INDEX DOCD: CPT | Mod: CPTII,S$GLB,, | Performed by: OTOLARYNGOLOGY

## 2023-10-10 PROCEDURE — 1160F PR REVIEW ALL MEDS BY PRESCRIBER/CLIN PHARMACIST DOCUMENTED: ICD-10-PCS | Mod: CPTII,S$GLB,, | Performed by: OTOLARYNGOLOGY

## 2023-10-10 PROCEDURE — 1159F MED LIST DOCD IN RCRD: CPT | Mod: CPTII,S$GLB,, | Performed by: OTOLARYNGOLOGY

## 2023-10-10 PROCEDURE — 1101F PT FALLS ASSESS-DOCD LE1/YR: CPT | Mod: CPTII,S$GLB,, | Performed by: OTOLARYNGOLOGY

## 2023-10-10 PROCEDURE — 99999 PR PBB SHADOW E&M-EST. PATIENT-LVL IV: ICD-10-PCS | Mod: PBBFAC,,, | Performed by: OTOLARYNGOLOGY

## 2023-10-10 PROCEDURE — 3008F PR BODY MASS INDEX (BMI) DOCUMENTED: ICD-10-PCS | Mod: CPTII,S$GLB,, | Performed by: OTOLARYNGOLOGY

## 2023-10-10 PROCEDURE — 1159F PR MEDICATION LIST DOCUMENTED IN MEDICAL RECORD: ICD-10-PCS | Mod: CPTII,S$GLB,, | Performed by: OTOLARYNGOLOGY

## 2023-10-10 PROCEDURE — 31231 NASAL ENDOSCOPY DX: CPT | Mod: S$GLB,,, | Performed by: OTOLARYNGOLOGY

## 2023-10-10 PROCEDURE — 99204 PR OFFICE/OUTPT VISIT, NEW, LEVL IV, 45-59 MIN: ICD-10-PCS | Mod: 25,S$GLB,, | Performed by: OTOLARYNGOLOGY

## 2023-10-10 PROCEDURE — 99204 OFFICE O/P NEW MOD 45 MIN: CPT | Mod: 25,S$GLB,, | Performed by: OTOLARYNGOLOGY

## 2023-10-10 PROCEDURE — 1126F PR PAIN SEVERITY QUANTIFIED, NO PAIN PRESENT: ICD-10-PCS | Mod: CPTII,S$GLB,, | Performed by: OTOLARYNGOLOGY

## 2023-10-10 PROCEDURE — 3288F FALL RISK ASSESSMENT DOCD: CPT | Mod: CPTII,S$GLB,, | Performed by: OTOLARYNGOLOGY

## 2023-10-10 PROCEDURE — 3044F HG A1C LEVEL LT 7.0%: CPT | Mod: CPTII,S$GLB,, | Performed by: OTOLARYNGOLOGY

## 2023-10-10 PROCEDURE — 3044F PR MOST RECENT HEMOGLOBIN A1C LEVEL <7.0%: ICD-10-PCS | Mod: CPTII,S$GLB,, | Performed by: OTOLARYNGOLOGY

## 2023-10-10 RX ORDER — IPRATROPIUM BROMIDE 42 UG/1
2 SPRAY, METERED NASAL 2 TIMES DAILY PRN
Qty: 15 ML | Refills: 6 | Status: SHIPPED | OUTPATIENT
Start: 2023-10-10

## 2023-10-10 NOTE — PROGRESS NOTES
Subjective:       Patient ID: Mallory David is a 68 y.o. female.    Chief Complaint: Cough    Mallory is here for cough.  Symptoms have been present for years but 2-3 times per year.  Has been present for 6 months, on and off. Began in Apr with an URI symptoms and given Azithromycin lasting for 3-4 weeks. Got better but then worse again in August. Tried Mucinex, given Omnicef. Cough lingered for about a month again but did clear up. She then developed what sounds like a conjunctivitis and treated more recently with another rd of oral antibiotics. Cough is back thought not as bad.  She localizes the symptoms to the throat area.  At times the cough may be productive, but other times it is dry.   She has some am post nasal drainage.  She uses a nasal lavage.  Has used Flonase intermittently in the past.   No tobacco history.     Therapies tried: Started last week back on Singulair and cough medication.               Social History     Tobacco Use   Smoking Status Never   Smokeless Tobacco Never     Social History     Substance and Sexual Activity   Alcohol Use Yes    Comment: occasional glass of red wine        Objective:        Constitutional:   She is oriented to person, place, and time. She appears well-developed and well-nourished. She appears alert. She is active. Normal speech.      Head:  Normocephalic and atraumatic. Head is without TMJ tenderness. No scars. Salivary glands normal.  Facial strength is normal.      Ears:    Right Ear: No drainage or swelling. No middle ear effusion.   Left Ear: No drainage or swelling.  No middle ear effusion.     Nose:  No mucosal edema, rhinorrhea or sinus tenderness. No turbinate hypertrophy.      Mouth/Throat  Oropharynx clear and moist without lesions or asymmetry, normal uvula midline and mirror exam normal. Normal dentition. No uvula swelling, lacerations or trismus. No oropharyngeal exudate. Tonsillar erythema, tonsillar exudate.    Hyperactive gag reflux. Unable to  visualize cords on mirror laryngoscopy.          Neck:  Full range of motion with neck supple and no adenopathy. Thyroid tenderness is present. No tracheal deviation, no edema, no erythema, normal range of motion, no stridor, no crepitus and no neck rigidity present. No thyroid mass present.   No laryngeal sens with cough     Cardiovascular:    Intact distal pulses and normal pulses.              Pulmonary/Chest:   Effort normal and breath sounds normal. No stridor.     Psychiatric:   Her speech is normal and behavior is normal. Her mood appears not anxious. Her affect is not labile.     Neurological:   She is alert and oriented to person, place, and time. No sensory deficit.     Skin:   No abrasions, lacerations, lesions, or rashes. No abrasion and no bruising noted.         Tests / Results:  XR Sinuses 10/2023: normal  XR chest: normal     Pre-procedure diagnosis: The primary encounter diagnosis was Cough, unspecified type. A diagnosis of Post-nasal drip was also pertinent to this visit.     Post-procedure diagnosis: same    Procedure: Flexible fiberoptic laryngoscopy    Surgeon: Phoenix Velasquez MD    Anesthesia: 4% Lidocaine with 0.25% Phenylephrine topical    Risks, benefits, and alternatives of the procedure were discussed with the patient, and the patient consented to the fiberoptic examination.  We applied a topical nasal decongestant and analgesic.  After adequate anesthesia was obtained, the flexible fiberoptic scope was passed through the nasal cavity. The entire pharynx (nasopharynx to hypopharynx) and the larynx were visualized. At the end of the examination, the scope was removed. The patient tolerated the procedure well with no complications.     Findings:  -     Laryngeal mucosa is normal  -     Post-cricoid region: normal  -     Lingual tonsils have Grade 1 hypertrophy  -     Adenoids have NO  hypertrophy  -     Right vocal fold: normal mobility     mass/lesion: none  -     Left vocal fold: normal  mobility     mass/lesion: none  -     Other findings: mucoid rhinorrhea eminating from right posterior aspect of inferior turbinate    Assessment:       1. Cough, unspecified type    2. Post-nasal drip          Plan:       Symptoms localize to area where she feels more issues with drip. Discussed cough may or may not be related to rhinitis / rhinorrhea.   Continue saline rinses  Add Atrovent. Can add Flonase if needed depending on response.   Can consider short course of steroids in lieu of antibiotics to see if it is more inflammatory than infectious (which I suspect may be case.)  Also consider CT if sinus source needs to be ruled in or out definitively, though XR neg    Discussed that URI can cause protracted cough so may be part of normal recovery.

## 2023-10-10 NOTE — PATIENT INSTRUCTIONS
Continue saline rinses and spray as needed, even up to daily to help keep the mucous down  Atrovent spray 1-2 times daily which helps with nasal drainage and mucous.  We can add an additional spray if needed (likely Flucticasone) if the drip persists.    Ultimately, we are trying to determine if the cough is related to a drip issue that can be medically treated or if it part of the normal response following acquisition of an Uupper respiratory illness. I'd like to try and use other medications prior to antibiotics to see if we can get the same response / improvement.     If you come down with illness prior, message me with symptoms and we will decide if we treat with a short course of oral steroids or need to see you in person for infectious evaluation. Message with symptoms and time course.     Atrovent (Ipratropium bromide)     This is a nasal spray that primarily treats nasal drainage (rhinorrhea).    You may use this medication 2-3 times per day depending on leakage. This works fairly quickly after onset and can be used as needed (does not have to be used as a scheduled medication)    Helpful hints for maximizing medication into the nose  - Use the opposite hand to spray the nostril (example: right hand for left nostril). This will help avoid spraying the medication onto the septum (the area that divides the left and right nasal cavity.)  - Tilt the bottle so that it is facing at a slight angle up or straight back, but avoid pointing the bottle straight up while spraying.   - Sniff in while you are spraying.    Please use caution when spraying nose if on anticoagulants (coumadin, aspirin, plavix) as a common side effect is nasal dryness and possible nosebleed.

## 2023-10-12 NOTE — PROGRESS NOTES
The patient has PSVT and is being treated now with a beta blocker successfully to prevent recurrences.  This will be continued.

## 2023-11-07 ENCOUNTER — OFFICE VISIT (OUTPATIENT)
Dept: DERMATOLOGY | Facility: CLINIC | Age: 68
End: 2023-11-07
Payer: COMMERCIAL

## 2023-11-07 DIAGNOSIS — Z12.83 SKIN CANCER SCREENING: ICD-10-CM

## 2023-11-07 DIAGNOSIS — L57.0 ACTINIC KERATOSIS: Primary | ICD-10-CM

## 2023-11-07 DIAGNOSIS — L57.0 BENIGN KERATOSIS: ICD-10-CM

## 2023-11-07 PROCEDURE — 1159F MED LIST DOCD IN RCRD: CPT | Mod: CPTII,S$GLB,, | Performed by: DERMATOLOGY

## 2023-11-07 PROCEDURE — 17000 PR DESTRUCTION(LASER SURGERY,CRYOSURGERY,CHEMOSURGERY),PREMALIGNANT LESIONS,FIRST LESION: ICD-10-PCS | Mod: S$GLB,,, | Performed by: DERMATOLOGY

## 2023-11-07 PROCEDURE — 99213 PR OFFICE/OUTPT VISIT, EST, LEVL III, 20-29 MIN: ICD-10-PCS | Mod: 25,S$GLB,, | Performed by: DERMATOLOGY

## 2023-11-07 PROCEDURE — 3044F PR MOST RECENT HEMOGLOBIN A1C LEVEL <7.0%: ICD-10-PCS | Mod: CPTII,S$GLB,, | Performed by: DERMATOLOGY

## 2023-11-07 PROCEDURE — 1101F PR PT FALLS ASSESS DOC 0-1 FALLS W/OUT INJ PAST YR: ICD-10-PCS | Mod: CPTII,S$GLB,, | Performed by: DERMATOLOGY

## 2023-11-07 PROCEDURE — 99999 PR PBB SHADOW E&M-EST. PATIENT-LVL III: CPT | Mod: PBBFAC,,, | Performed by: DERMATOLOGY

## 2023-11-07 PROCEDURE — 3044F HG A1C LEVEL LT 7.0%: CPT | Mod: CPTII,S$GLB,, | Performed by: DERMATOLOGY

## 2023-11-07 PROCEDURE — 17000 DESTRUCT PREMALG LESION: CPT | Mod: S$GLB,,, | Performed by: DERMATOLOGY

## 2023-11-07 PROCEDURE — 1160F RVW MEDS BY RX/DR IN RCRD: CPT | Mod: CPTII,S$GLB,, | Performed by: DERMATOLOGY

## 2023-11-07 PROCEDURE — 99213 OFFICE O/P EST LOW 20 MIN: CPT | Mod: 25,S$GLB,, | Performed by: DERMATOLOGY

## 2023-11-07 PROCEDURE — 3288F PR FALLS RISK ASSESSMENT DOCUMENTED: ICD-10-PCS | Mod: CPTII,S$GLB,, | Performed by: DERMATOLOGY

## 2023-11-07 PROCEDURE — 3288F FALL RISK ASSESSMENT DOCD: CPT | Mod: CPTII,S$GLB,, | Performed by: DERMATOLOGY

## 2023-11-07 PROCEDURE — 99999 PR PBB SHADOW E&M-EST. PATIENT-LVL III: ICD-10-PCS | Mod: PBBFAC,,, | Performed by: DERMATOLOGY

## 2023-11-07 PROCEDURE — 1159F PR MEDICATION LIST DOCUMENTED IN MEDICAL RECORD: ICD-10-PCS | Mod: CPTII,S$GLB,, | Performed by: DERMATOLOGY

## 2023-11-07 PROCEDURE — 1101F PT FALLS ASSESS-DOCD LE1/YR: CPT | Mod: CPTII,S$GLB,, | Performed by: DERMATOLOGY

## 2023-11-07 PROCEDURE — 1160F PR REVIEW ALL MEDS BY PRESCRIBER/CLIN PHARMACIST DOCUMENTED: ICD-10-PCS | Mod: CPTII,S$GLB,, | Performed by: DERMATOLOGY

## 2023-11-07 NOTE — PROGRESS NOTES
Subjective:      Patient ID:  Mallorychiara David is a 68 y.o. female who presents for No chief complaint on file.    HPI  Patient here today for FBSC.   LOV 5/2023 with Dr. Sanchez.      no Phx of NMSC.  Fhx of NMSC  + Mother and Father hx of BCC  no Fhx of melanoma  Review of Systems   Constitutional:  Negative for fever, chills and fatigue.   Skin:  Positive for daily sunscreen use, activity-related sunscreen use and wears hat.   Hematologic/Lymphatic: Does not bruise/bleed easily.       Objective:   Physical Exam   Constitutional: She appears well-developed and well-nourished. No distress.   HENT:   Mouth/Throat: Lips normal.    Eyes: Lids are normal.  No conjunctival no injection.   Cardiovascular:  There is no local extremity swelling and no dependent edema.             Neurological: She is alert and oriented to person, place, and time. She is not disoriented.   Psychiatric: She has a normal mood and affect.   Skin:   Areas Examined (abnormalities noted in diagram):   Scalp / Hair Palpated and Inspected  Head / Face Inspection Performed  Neck Inspection Performed  Chest / Axilla Inspection Performed  Abdomen Inspection Performed  Genitals / Buttocks / Groin Inspection Performed  Back Inspection Performed  RUE Inspected  LUE Inspection Performed  RLE Inspected  LLE Inspection Performed  Nails and Digits Inspection Performed                 Diagram Legend     Erythematous scaling macule/papule c/w actinic keratosis       Vascular papule c/w angioma      Pigmented verrucoid papule/plaque c/w seborrheic keratosis      Yellow umbilicated papule c/w sebaceous hyperplasia      Irregularly shaped tan macule c/w lentigo     1-2 mm smooth white papules consistent with Milia      Movable subcutaneous cyst with punctum c/w epidermal inclusion cyst      Subcutaneous movable cyst c/w pilar cyst      Firm pink to brown papule c/w dermatofibroma      Pedunculated fleshy papule(s) c/w skin tag(s)      Evenly pigmented macule  c/w junctional nevus     Mildly variegated pigmented, slightly irregular-bordered macule c/w mildly atypical nevus      Flesh colored to evenly pigmented papule c/w intradermal nevus       Pink pearly papule/plaque c/w basal cell carcinoma      Erythematous hyperkeratotic cursted plaque c/w SCC      Surgical scar with no sign of skin cancer recurrence      Open and closed comedones      Inflammatory papules and pustules      Verrucoid papule consistent consistent with wart     Erythematous eczematous patches and plaques     Dystrophic onycholytic nail with subungual debris c/w onychomycosis     Umbilicated papule    Erythematous-base heme-crusted tan verrucoid plaque consistent with inflamed seborrheic keratosis     Erythematous Silvery Scaling Plaque c/w Psoriasis     See annotation      Assessment / Plan:        Actinic keratosis    Back  Cryosurgery Procedure Note    Verbal consent from the patient is obtained and the patient is aware of the precancerous quality and need for treatment of these lesions. Liquid nitrogen cryosurgery is applied to the 1 actinic keratoses, as detailed in the physical exam, to produce a freeze injury. The patient is aware that blisters may form and is instructed on wound care with gentle cleansing and use of vaseline ointment to keep moist until healed. The patient is supplied a handout on cryosurgery and is instructed to call if lesions do not completely resolve. Discussed risk postinflammatory pigmentary changes.     Skin cancer screening  Upper body skin examination performed today including at least 6 points as noted in physical examination. No lesions suspicious for malignancy noted.      Benign keratosis  Forehead  These are benign inherited growths without a malignant potential. Reassurance given to patient. No treatment is necessary.                Follow up in about 6 months (around 5/7/2024).

## 2023-11-10 ENCOUNTER — PATIENT MESSAGE (OUTPATIENT)
Dept: FAMILY MEDICINE | Facility: CLINIC | Age: 68
End: 2023-11-10
Payer: COMMERCIAL

## 2023-11-10 DIAGNOSIS — K76.0 FATTY LIVER: ICD-10-CM

## 2023-11-10 DIAGNOSIS — E04.9 GOITER: ICD-10-CM

## 2023-11-10 DIAGNOSIS — E03.9 HYPOTHYROIDISM, UNSPECIFIED TYPE: Primary | ICD-10-CM

## 2023-11-10 DIAGNOSIS — I10 ESSENTIAL HYPERTENSION: ICD-10-CM

## 2023-11-13 ENCOUNTER — HOSPITAL ENCOUNTER (OUTPATIENT)
Dept: RADIOLOGY | Facility: HOSPITAL | Age: 68
Discharge: HOME | End: 2023-11-13
Attending: FAMILY MEDICINE
Payer: COMMERCIAL

## 2023-11-13 DIAGNOSIS — Z12.31 ENCOUNTER FOR SCREENING MAMMOGRAM FOR BREAST CANCER: ICD-10-CM

## 2023-11-13 DIAGNOSIS — E04.9 GOITER: ICD-10-CM

## 2023-11-13 DIAGNOSIS — E03.9 HYPOTHYROIDISM, UNSPECIFIED TYPE: ICD-10-CM

## 2023-11-13 PROCEDURE — 76536 US EXAM OF HEAD AND NECK: CPT | Mod: 26,,, | Performed by: RADIOLOGY

## 2023-11-13 PROCEDURE — 77067 SCR MAMMO BI INCL CAD: CPT | Mod: 26,,, | Performed by: RADIOLOGY

## 2023-11-13 PROCEDURE — 76536 US THYROID: ICD-10-PCS | Mod: 26,,, | Performed by: RADIOLOGY

## 2023-11-13 PROCEDURE — 77067 SCR MAMMO BI INCL CAD: CPT | Mod: TC,PO

## 2023-11-13 PROCEDURE — 76536 US EXAM OF HEAD AND NECK: CPT | Mod: TC,PO

## 2023-11-13 PROCEDURE — 77063 MAMMO DIGITAL SCREENING BILAT WITH TOMO: ICD-10-PCS | Mod: 26,,, | Performed by: RADIOLOGY

## 2023-11-13 PROCEDURE — 77067 MAMMO DIGITAL SCREENING BILAT WITH TOMO: ICD-10-PCS | Mod: 26,,, | Performed by: RADIOLOGY

## 2023-11-13 PROCEDURE — 77063 BREAST TOMOSYNTHESIS BI: CPT | Mod: 26,,, | Performed by: RADIOLOGY

## 2023-11-15 ENCOUNTER — OFFICE VISIT (OUTPATIENT)
Dept: CARDIOLOGY | Facility: CLINIC | Age: 68
End: 2023-11-15
Payer: COMMERCIAL

## 2023-11-15 VITALS
HEIGHT: 62 IN | BODY MASS INDEX: 31.65 KG/M2 | SYSTOLIC BLOOD PRESSURE: 130 MMHG | OXYGEN SATURATION: 98 % | DIASTOLIC BLOOD PRESSURE: 78 MMHG | WEIGHT: 172 LBS | HEART RATE: 67 BPM

## 2023-11-15 DIAGNOSIS — E78.00 PURE HYPERCHOLESTEROLEMIA: ICD-10-CM

## 2023-11-15 DIAGNOSIS — Q21.11 ASD (ATRIAL SEPTAL DEFECT), OSTIUM SECUNDUM: ICD-10-CM

## 2023-11-15 DIAGNOSIS — I10 PRIMARY HYPERTENSION: Primary | ICD-10-CM

## 2023-11-15 DIAGNOSIS — I47.10 SVT (SUPRAVENTRICULAR TACHYCARDIA): ICD-10-CM

## 2023-11-15 PROCEDURE — 3078F PR MOST RECENT DIASTOLIC BLOOD PRESSURE < 80 MM HG: ICD-10-PCS | Mod: CPTII,S$GLB,, | Performed by: INTERNAL MEDICINE

## 2023-11-15 PROCEDURE — 3044F HG A1C LEVEL LT 7.0%: CPT | Mod: CPTII,S$GLB,, | Performed by: INTERNAL MEDICINE

## 2023-11-15 PROCEDURE — 3075F PR MOST RECENT SYSTOLIC BLOOD PRESS GE 130-139MM HG: ICD-10-PCS | Mod: CPTII,S$GLB,, | Performed by: INTERNAL MEDICINE

## 2023-11-15 PROCEDURE — 99214 OFFICE O/P EST MOD 30 MIN: CPT | Mod: S$GLB,,, | Performed by: INTERNAL MEDICINE

## 2023-11-15 PROCEDURE — 3288F FALL RISK ASSESSMENT DOCD: CPT | Mod: CPTII,S$GLB,, | Performed by: INTERNAL MEDICINE

## 2023-11-15 PROCEDURE — 3044F PR MOST RECENT HEMOGLOBIN A1C LEVEL <7.0%: ICD-10-PCS | Mod: CPTII,S$GLB,, | Performed by: INTERNAL MEDICINE

## 2023-11-15 PROCEDURE — 3008F PR BODY MASS INDEX (BMI) DOCUMENTED: ICD-10-PCS | Mod: CPTII,S$GLB,, | Performed by: INTERNAL MEDICINE

## 2023-11-15 PROCEDURE — 99999 PR PBB SHADOW E&M-EST. PATIENT-LVL IV: CPT | Mod: PBBFAC,,, | Performed by: INTERNAL MEDICINE

## 2023-11-15 PROCEDURE — 1101F PR PT FALLS ASSESS DOC 0-1 FALLS W/OUT INJ PAST YR: ICD-10-PCS | Mod: CPTII,S$GLB,, | Performed by: INTERNAL MEDICINE

## 2023-11-15 PROCEDURE — 1160F RVW MEDS BY RX/DR IN RCRD: CPT | Mod: CPTII,S$GLB,, | Performed by: INTERNAL MEDICINE

## 2023-11-15 PROCEDURE — 99999 PR PBB SHADOW E&M-EST. PATIENT-LVL IV: ICD-10-PCS | Mod: PBBFAC,,, | Performed by: INTERNAL MEDICINE

## 2023-11-15 PROCEDURE — 1126F PR PAIN SEVERITY QUANTIFIED, NO PAIN PRESENT: ICD-10-PCS | Mod: CPTII,S$GLB,, | Performed by: INTERNAL MEDICINE

## 2023-11-15 PROCEDURE — 99214 PR OFFICE/OUTPT VISIT, EST, LEVL IV, 30-39 MIN: ICD-10-PCS | Mod: S$GLB,,, | Performed by: INTERNAL MEDICINE

## 2023-11-15 PROCEDURE — 1159F PR MEDICATION LIST DOCUMENTED IN MEDICAL RECORD: ICD-10-PCS | Mod: CPTII,S$GLB,, | Performed by: INTERNAL MEDICINE

## 2023-11-15 PROCEDURE — 3288F PR FALLS RISK ASSESSMENT DOCUMENTED: ICD-10-PCS | Mod: CPTII,S$GLB,, | Performed by: INTERNAL MEDICINE

## 2023-11-15 PROCEDURE — 1160F PR REVIEW ALL MEDS BY PRESCRIBER/CLIN PHARMACIST DOCUMENTED: ICD-10-PCS | Mod: CPTII,S$GLB,, | Performed by: INTERNAL MEDICINE

## 2023-11-15 PROCEDURE — 3078F DIAST BP <80 MM HG: CPT | Mod: CPTII,S$GLB,, | Performed by: INTERNAL MEDICINE

## 2023-11-15 PROCEDURE — 1159F MED LIST DOCD IN RCRD: CPT | Mod: CPTII,S$GLB,, | Performed by: INTERNAL MEDICINE

## 2023-11-15 PROCEDURE — 1126F AMNT PAIN NOTED NONE PRSNT: CPT | Mod: CPTII,S$GLB,, | Performed by: INTERNAL MEDICINE

## 2023-11-15 PROCEDURE — 3075F SYST BP GE 130 - 139MM HG: CPT | Mod: CPTII,S$GLB,, | Performed by: INTERNAL MEDICINE

## 2023-11-15 PROCEDURE — 1101F PT FALLS ASSESS-DOCD LE1/YR: CPT | Mod: CPTII,S$GLB,, | Performed by: INTERNAL MEDICINE

## 2023-11-15 PROCEDURE — 3008F BODY MASS INDEX DOCD: CPT | Mod: CPTII,S$GLB,, | Performed by: INTERNAL MEDICINE

## 2023-11-15 NOTE — PROGRESS NOTES
Subjective:   Patient ID:  Malloryleslee David is a 68 y.o. female who presents for follow-up of Annual Exam  Patient denies CP, angina or anginal equivalent.Pt with rare palpitations.    Hypertension  This is a chronic problem. The current episode started more than 1 year ago. The problem has been gradually improving since onset. The problem is controlled. Pertinent negatives include no chest pain, palpitations or shortness of breath. Past treatments include beta blockers and diuretics. The current treatment provides moderate improvement. There are no compliance problems.    Hyperlipidemia  This is a chronic problem. The current episode started more than 1 year ago. The problem is controlled. Pertinent negatives include no chest pain or shortness of breath. Current antihyperlipidemic treatment includes statins. The current treatment provides moderate improvement of lipids. There are no compliance problems.    Edema  This is a chronic problem. The current episode started more than 1 year ago. The problem occurs rarely. The problem has been gradually improving. Pertinent negatives include no chest pain. Nothing aggravates the symptoms. She has tried rest for the symptoms.   Dizziness:   Chronicity:  New  Onset:  1 to 4 weeks ago  Progression since onset:  Waxing and waning  Severity:  Mild  Duration:  Very brief  Dizziness characteristics:  Lightheaded/impending faintno palpitations and no chest pain.  Aggravated by:  Nothing  Treatments tried:  Rest  Improvements on treatment:  Moderate      Review of Systems   Constitutional: Negative. Negative for weight gain.   HENT: Negative.     Eyes: Negative.    Cardiovascular: Negative.  Negative for chest pain, leg swelling and palpitations.   Respiratory: Negative.  Negative for shortness of breath.    Endocrine: Negative.    Hematologic/Lymphatic: Negative.    Skin: Negative.    Musculoskeletal:  Negative for muscle weakness.   Gastrointestinal: Negative.    Genitourinary:  Negative.    Neurological: Negative.  Negative for dizziness.   Psychiatric/Behavioral: Negative.     Allergic/Immunologic: Negative.    All other systems reviewed and are negative.    Family History   Problem Relation Age of Onset    Hypertension Mother     Arthritis Mother     Hearing loss Mother         cholesteatoma surgery    Cancer Father         colon    Diabetes Father     Breast cancer Sister     Cancer Sister         Breast    Aneurysm Brother         this was of his heart.    Birth defects Brother         Heart murmer    Heart disease Brother     Hypertension Brother     Cancer Brother         Prostate Cancer    Diabetes Brother     Diabetes Brother     Hypertension Brother     Diabetes Sister     Heart disease Sister     Hypertension Sister     Stroke Sister     Melanoma Neg Hx      Past Medical History:   Diagnosis Date    *Atrial flutter     s/p ablation    Abnormal Pap smear of cervix     ASD (atrial septal defect), ostium secundum     repaired    Benign hypertensive heart disease without heart failure     Endometriosis     Fatty liver     Hyperlipidemia     Hypertension     2009    Nodular goiter (nontoxic)     1999    Special screening for malignant neoplasms, colon 10/28/2014     Social History     Socioeconomic History    Marital status:      Spouse name: Alexi    Number of children: 2   Occupational History    Occupation:      Employer: [employer]   Tobacco Use    Smoking status: Never    Smokeless tobacco: Never   Substance and Sexual Activity    Alcohol use: Yes     Comment: occasional glass of red wine    Drug use: No    Sexual activity: Not Currently     Partners: Male     Birth control/protection: Post-menopausal     Social Determinants of Health     Financial Resource Strain: Low Risk  (11/6/2023)    Overall Financial Resource Strain (CARDIA)     Difficulty of Paying Living Expenses: Not hard at all   Food Insecurity: No Food Insecurity (11/6/2023)    Hunger Vital  Sign     Worried About Running Out of Food in the Last Year: Never true     Ran Out of Food in the Last Year: Never true   Transportation Needs: No Transportation Needs (11/6/2023)    PRAPARE - Transportation     Lack of Transportation (Medical): No     Lack of Transportation (Non-Medical): No   Physical Activity: Sufficiently Active (11/6/2023)    Exercise Vital Sign     Days of Exercise per Week: 5 days     Minutes of Exercise per Session: 30 min   Recent Concern: Physical Activity - Insufficiently Active (10/4/2023)    Exercise Vital Sign     Days of Exercise per Week: 4 days     Minutes of Exercise per Session: 30 min   Stress: No Stress Concern Present (11/6/2023)    [employer] - Occupational Stress Questionnaire     Feeling of Stress : Only a little   Social Connections: Unknown (11/6/2023)    Social Connection and Isolation Panel [NHANES]     Frequency of Communication with Friends and Family: More than three times a week     Frequency of Social Gatherings with Friends and Family: Once a week     Active Member of Clubs or Organizations: Yes     Attends Club or Organization Meetings: 1 to 4 times per year     Marital Status:    Housing Stability: Low Risk  (11/6/2023)    Housing Stability Vital Sign     Unable to Pay for Housing in the Last Year: No     Number of Places Lived in the Last Year: 1     Unstable Housing in the Last Year: No     Current Outpatient Medications on File Prior to Visit   Medication Sig Dispense Refill    ANTIOX #11/OM3/DHA/EPA/LUT/NAMAN (50+ ADULT EYE HEALTH ORAL) Take by mouth.      aspirin 81 MG chewable tablet Take 81 mg by mouth once daily.      cetirizine 10 mg Cap Zyrtec   1 po q day      furosemide (LASIX) 40 MG tablet TAKE 1 TABLET (40 MG) BY MOUTH 3 TIMES DAILY 270 tablet 3    guaiFENesin-codeine 100-10 mg/5 ml (TUSSI-ORGANIDIN NR)  mg/5 mL syrup Take 5-10 ml po every 6 hours as needed for cough 118 mL 0    ipratropium (ATROVENT) 42 mcg  (0.06 %) nasal spray 2 sprays by Each Nostril route 2 (two) times daily as needed for Rhinitis. 15 mL 6    levothyroxine (SYNTHROID) 150 MCG tablet Take 1 tablet (150 mcg total) by mouth before breakfast. 90 tablet 3    MAGNESIUM ORAL Take by mouth once daily.      metoprolol succinate (TOPROL-XL) 100 MG 24 hr tablet Take 1 tablet (100 mg total) by mouth once daily. 90 tablet 3    mometasone (ELOCON) 0.1 % ointment aaa bid x 1-2 weeks then prn 45 g 0    potassium chloride SA (K-DUR,KLOR-CON) 20 MEQ tablet Take 1 tablet (20 mEq total) by mouth once daily. 90 tablet 3    rosuvastatin (CRESTOR) 10 MG tablet Take 1 tablet (10 mg total) by mouth once daily. 90 tablet 3    fluticasone propionate (FLONASE) 50 mcg/actuation nasal spray Use 2 sprays to each nostril daily (Patient not taking: Reported on 11/15/2023) 16 g 0     No current facility-administered medications on file prior to visit.     Review of patient's allergies indicates:   Allergen Reactions    No known drug allergies        Objective:     Physical Exam  Vitals and nursing note reviewed.   Constitutional:       Appearance: She is well-developed.   HENT:      Head: Normocephalic and atraumatic.   Eyes:      Conjunctiva/sclera: Conjunctivae normal.      Pupils: Pupils are equal, round, and reactive to light.   Cardiovascular:      Rate and Rhythm: Normal rate and regular rhythm.      Pulses: Intact distal pulses.      Heart sounds: Normal heart sounds.   Pulmonary:      Effort: Pulmonary effort is normal.      Breath sounds: Normal breath sounds.   Abdominal:      General: Bowel sounds are normal.      Palpations: Abdomen is soft.   Musculoskeletal:         General: Normal range of motion.      Cervical back: Normal range of motion and neck supple.   Skin:     General: Skin is warm and dry.   Neurological:      Mental Status: She is alert and oriented to person, place, and time.         Assessment:     1. Primary hypertension    2. SVT (supraventricular  tachycardia)    3. ASD (atrial septal defect), ostium secundum    4. Pure hypercholesterolemia        Plan:     Primary hypertension    SVT (supraventricular tachycardia)    ASD (atrial septal defect), ostium secundum    Pure hypercholesterolemia      Continue metoprolol, lasix-htn/svt  Continue statin-hlp  Exercise

## 2023-12-06 ENCOUNTER — OFFICE VISIT (OUTPATIENT)
Dept: FAMILY MEDICINE | Facility: CLINIC | Age: 68
End: 2023-12-06
Payer: COMMERCIAL

## 2023-12-06 VITALS
DIASTOLIC BLOOD PRESSURE: 68 MMHG | RESPIRATION RATE: 14 BRPM | HEART RATE: 68 BPM | BODY MASS INDEX: 32.2 KG/M2 | HEIGHT: 62 IN | SYSTOLIC BLOOD PRESSURE: 122 MMHG | WEIGHT: 175 LBS

## 2023-12-06 DIAGNOSIS — J01.90 ACUTE BACTERIAL SINUSITIS: ICD-10-CM

## 2023-12-06 DIAGNOSIS — E78.00 PURE HYPERCHOLESTEROLEMIA: Primary | ICD-10-CM

## 2023-12-06 DIAGNOSIS — I10 HYPERTENSION, UNSPECIFIED TYPE: ICD-10-CM

## 2023-12-06 DIAGNOSIS — R05.3 CHRONIC COUGH: ICD-10-CM

## 2023-12-06 DIAGNOSIS — J30.9 ALLERGIC RHINITIS, UNSPECIFIED SEASONALITY, UNSPECIFIED TRIGGER: ICD-10-CM

## 2023-12-06 DIAGNOSIS — B96.89 ACUTE BACTERIAL SINUSITIS: ICD-10-CM

## 2023-12-06 DIAGNOSIS — E05.00 TOXIC DIFFUSE GOITER: ICD-10-CM

## 2023-12-06 DIAGNOSIS — I10 ESSENTIAL HYPERTENSION: ICD-10-CM

## 2023-12-06 DIAGNOSIS — I47.10 PAROXYSMAL SUPRAVENTRICULAR TACHYCARDIA: ICD-10-CM

## 2023-12-06 DIAGNOSIS — K86.2 PANCREATIC CYST: ICD-10-CM

## 2023-12-06 PROCEDURE — 3078F DIAST BP <80 MM HG: CPT | Mod: CPTII,S$GLB,, | Performed by: FAMILY MEDICINE

## 2023-12-06 PROCEDURE — 3044F HG A1C LEVEL LT 7.0%: CPT | Mod: CPTII,S$GLB,, | Performed by: FAMILY MEDICINE

## 2023-12-06 PROCEDURE — 1126F PR PAIN SEVERITY QUANTIFIED, NO PAIN PRESENT: ICD-10-PCS | Mod: CPTII,S$GLB,, | Performed by: FAMILY MEDICINE

## 2023-12-06 PROCEDURE — 99999 PR PBB SHADOW E&M-EST. PATIENT-LVL III: ICD-10-PCS | Mod: PBBFAC,,, | Performed by: FAMILY MEDICINE

## 2023-12-06 PROCEDURE — 99214 OFFICE O/P EST MOD 30 MIN: CPT | Mod: 25,S$GLB,, | Performed by: FAMILY MEDICINE

## 2023-12-06 PROCEDURE — 1101F PT FALLS ASSESS-DOCD LE1/YR: CPT | Mod: CPTII,S$GLB,, | Performed by: FAMILY MEDICINE

## 2023-12-06 PROCEDURE — 3008F PR BODY MASS INDEX (BMI) DOCUMENTED: ICD-10-PCS | Mod: CPTII,S$GLB,, | Performed by: FAMILY MEDICINE

## 2023-12-06 PROCEDURE — 3074F PR MOST RECENT SYSTOLIC BLOOD PRESSURE < 130 MM HG: ICD-10-PCS | Mod: CPTII,S$GLB,, | Performed by: FAMILY MEDICINE

## 2023-12-06 PROCEDURE — 99214 PR OFFICE/OUTPT VISIT, EST, LEVL IV, 30-39 MIN: ICD-10-PCS | Mod: 25,S$GLB,, | Performed by: FAMILY MEDICINE

## 2023-12-06 PROCEDURE — 1159F PR MEDICATION LIST DOCUMENTED IN MEDICAL RECORD: ICD-10-PCS | Mod: CPTII,S$GLB,, | Performed by: FAMILY MEDICINE

## 2023-12-06 PROCEDURE — 3288F FALL RISK ASSESSMENT DOCD: CPT | Mod: CPTII,S$GLB,, | Performed by: FAMILY MEDICINE

## 2023-12-06 PROCEDURE — 1126F AMNT PAIN NOTED NONE PRSNT: CPT | Mod: CPTII,S$GLB,, | Performed by: FAMILY MEDICINE

## 2023-12-06 PROCEDURE — 90471 FLU VACCINE - QUADRIVALENT - ADJUVANTED: ICD-10-PCS | Mod: S$GLB,,, | Performed by: FAMILY MEDICINE

## 2023-12-06 PROCEDURE — 1101F PR PT FALLS ASSESS DOC 0-1 FALLS W/OUT INJ PAST YR: ICD-10-PCS | Mod: CPTII,S$GLB,, | Performed by: FAMILY MEDICINE

## 2023-12-06 PROCEDURE — 90694 VACC AIIV4 NO PRSRV 0.5ML IM: CPT | Mod: S$GLB,,, | Performed by: FAMILY MEDICINE

## 2023-12-06 PROCEDURE — 99999 PR PBB SHADOW E&M-EST. PATIENT-LVL III: CPT | Mod: PBBFAC,,, | Performed by: FAMILY MEDICINE

## 2023-12-06 PROCEDURE — 3074F SYST BP LT 130 MM HG: CPT | Mod: CPTII,S$GLB,, | Performed by: FAMILY MEDICINE

## 2023-12-06 PROCEDURE — 3008F BODY MASS INDEX DOCD: CPT | Mod: CPTII,S$GLB,, | Performed by: FAMILY MEDICINE

## 2023-12-06 PROCEDURE — 3288F PR FALLS RISK ASSESSMENT DOCUMENTED: ICD-10-PCS | Mod: CPTII,S$GLB,, | Performed by: FAMILY MEDICINE

## 2023-12-06 PROCEDURE — 3078F PR MOST RECENT DIASTOLIC BLOOD PRESSURE < 80 MM HG: ICD-10-PCS | Mod: CPTII,S$GLB,, | Performed by: FAMILY MEDICINE

## 2023-12-06 PROCEDURE — 1159F MED LIST DOCD IN RCRD: CPT | Mod: CPTII,S$GLB,, | Performed by: FAMILY MEDICINE

## 2023-12-06 PROCEDURE — 3044F PR MOST RECENT HEMOGLOBIN A1C LEVEL <7.0%: ICD-10-PCS | Mod: CPTII,S$GLB,, | Performed by: FAMILY MEDICINE

## 2023-12-06 PROCEDURE — 90694 FLU VACCINE - QUADRIVALENT - ADJUVANTED: ICD-10-PCS | Mod: S$GLB,,, | Performed by: FAMILY MEDICINE

## 2023-12-06 PROCEDURE — 90471 IMMUNIZATION ADMIN: CPT | Mod: S$GLB,,, | Performed by: FAMILY MEDICINE

## 2023-12-06 RX ORDER — FUROSEMIDE 40 MG/1
TABLET ORAL
Qty: 270 TABLET | Refills: 3 | Status: SHIPPED | OUTPATIENT
Start: 2023-12-06

## 2023-12-06 RX ORDER — METOPROLOL SUCCINATE 100 MG/1
100 TABLET, EXTENDED RELEASE ORAL DAILY
Qty: 90 TABLET | Refills: 3 | Status: SHIPPED | OUTPATIENT
Start: 2023-12-06

## 2023-12-06 RX ORDER — ROSUVASTATIN CALCIUM 10 MG/1
10 TABLET, COATED ORAL DAILY
Qty: 90 TABLET | Refills: 3 | Status: SHIPPED | OUTPATIENT
Start: 2023-12-06

## 2023-12-06 RX ORDER — LEVOTHYROXINE SODIUM 125 UG/1
125 TABLET ORAL
Qty: 30 TABLET | Refills: 11 | Status: SHIPPED | OUTPATIENT
Start: 2023-12-06 | End: 2024-02-26 | Stop reason: SDUPTHER

## 2023-12-06 RX ORDER — FLUTICASONE PROPIONATE 50 MCG
SPRAY, SUSPENSION (ML) NASAL
Qty: 16 G | Refills: 0 | Status: SHIPPED | OUTPATIENT
Start: 2023-12-06 | End: 2024-01-31 | Stop reason: SDUPTHER

## 2023-12-06 RX ORDER — POTASSIUM CHLORIDE 20 MEQ/1
20 TABLET, EXTENDED RELEASE ORAL DAILY
Qty: 90 TABLET | Refills: 3 | Status: SHIPPED | OUTPATIENT
Start: 2023-12-06

## 2023-12-06 NOTE — PROGRESS NOTES
Subjective:      Patient ID: Mallorykamilah David is a 68 y.o. female.    Chief Complaint: Thyroid Problem and Annual Exam    Problem List Items Addressed This Visit       Hyperlipidemia - Primary    Overview     The patient presents with hyperlipidemia.  The patient reports tolerating the medication well and is in excellent compliance.  There have been no medication side effects.  The patient denies chest pain, neuropathy, and myalgias.  The patient has reduced fat intake and has been exercising.  Current treatment has included the medications listed in the med card.    Lab Results   Component Value Date    CHOL 188 11/13/2023    CHOL 215 (H) 11/10/2022    CHOL 228 (H) 10/27/2021       Lab Results   Component Value Date    HDL 61 11/13/2023    HDL 76 (H) 11/10/2022    HDL 75 10/27/2021       Lab Results   Component Value Date    LDLCALC 105.0 11/13/2023    LDLCALC 108.6 11/10/2022    LDLCALC 108.6 10/27/2021       Lab Results   Component Value Date    TRIG 110 11/13/2023    TRIG 152 (H) 11/10/2022    TRIG 222 (H) 10/27/2021       Lab Results   Component Value Date    CHOLHDL 32.4 11/13/2023    CHOLHDL 35.3 11/10/2022    CHOLHDL 32.9 10/27/2021     Lab Results   Component Value Date    ALT 25 11/13/2023    AST 25 11/13/2023    ALKPHOS 78 11/13/2023    BILITOT 1.2 (H) 11/13/2023   '           Relevant Medications    rosuvastatin (CRESTOR) 10 MG tablet    Hypertension    Overview     The patient presents with essential hypertension.  The patient is tolerating the medication well and is in excellent compliance.  The patient is experiencing no side effects.  Counseling was offered regarding low salt diets.  The patient has a reduced salt intake.  The patient denies chest pain, palpitations, shortness of breath, dyspnea on exertion, left or murmur neck pain, nausea, vomiting, diaphoresis, paroxysmal nocturnal dyspnea, and orthopnea.   Hypertension Medications               furosemide (LASIX) 40 MG tablet Take 1 tablet (40 mg  total) by mouth 2 (two) times daily.    metoprolol succinate (TOPROL-XL) 50 MG 24 hr tablet Take 1 tablet (50 mg total) by mouth once daily.            Relevant Medications    furosemide (LASIX) 40 MG tablet    metoprolol succinate (TOPROL-XL) 100 MG 24 hr tablet    potassium chloride SA (K-DUR,KLOR-CON) 20 MEQ tablet    Pancreatic cyst    Overview     She was found to have pancreatic cysts in 2022. She was seen by Dr. Alvarez and he recommended a repeat scan in 1 year.  She has an appt with Dr. Anderson to f/u on this at this time.  Below are the recommendations from Dr. Alvarez.      structions    MRI that you had showed that he had multiple small cysts with the largest being 6 mm in the pancreas.  The cysts are most likely something called a branch type IPMN.  These are types of cysts have a low risk of becoming cancer but need to be monitored.    I do not have any scans from prior available to review so the current MRI will be used as her baseline MRI to compare further scans with.  The risk of these becoming cancer in her case is low and the only recommendation at this time is to monitor these cysts by repeating an MRI/MRCP in 1 year.       On repeat MRCP is if the cyst is growing size, something grows inside of them, or the pancreas around the cysts have changes then endoscopic ultrasound would be indicated and scheduled.  This is a test by endoscopy when the camera the ultrasound is used to examine the pancreas and obtain biopsies were samples as needed.  Your current cysts are too small to be evaluated for this test and the yield would be very well.    Because of your sister's history of breast cancer I would recommend asking her if she has been tested for the BRCA gene she was found to have a BRCA1 or BRCA2 genes then you might need to be checked as well.  Carriers of the genes to have a slight increased risk of pancreas cancer.  Monitoring for pancreas cancer will be done by the MRI as well he will be  scan.              Relevant Orders    MRI MRCP Without Contrast    Toxic diffuse goiter    Overview     The patient presents with hypothyroidism.  The patient denies agitation, anxiety, blurred vision, chest pain, cold intolerance, constipation, dizziness, dry skin, fatigue, lightheadedness, paresthesias, skin coarsening, tachycardia, tremor, weight gain or weight loss.  The patient's current treatment has included Synthroid with a good response but it is low now and I will need to adjust the medicine..    Lab Results   Component Value Date    TSH 0.057 (L) 11/13/2023            Relevant Medications    levothyroxine (SYNTHROID) 125 MCG tablet    Other Relevant Orders    TSH     Other Visit Diagnoses       Essential hypertension        Relevant Medications    furosemide (LASIX) 40 MG tablet    metoprolol succinate (TOPROL-XL) 100 MG 24 hr tablet    potassium chloride SA (K-DUR,KLOR-CON) 20 MEQ tablet    Paroxysmal supraventricular tachycardia        Relevant Medications    metoprolol succinate (TOPROL-XL) 100 MG 24 hr tablet    Allergic rhinitis, unspecified seasonality, unspecified trigger        Relevant Medications    fluticasone propionate (FLONASE) 50 mcg/actuation nasal spray    Chronic cough        Relevant Medications    fluticasone propionate (FLONASE) 50 mcg/actuation nasal spray    Acute bacterial sinusitis              She has had multiple URI's over the last year and had to have abx at one point.   She had a reaction of the left eye and she had to see Dr. Emanuel.  She got on augmentin for this also.  She showed me photos of it today.  She then had a cough and has had this for a while.  She went to Dr. Phoenix Velasquez for the ENT issue and she had a scope.  (For the chronic cough)  I reviewed his notes an see his recommendations:  Continue saline rinses and spray as needed, even up to daily to help keep the mucous down  Atrovent spray 1-2 times daily which helps with nasal drainage and mucous.  We can add  an additional spray if needed (likely Flucticasone) if the drip persists.     She is not coughing up blood.  She has used a nettipot.    No fever noted.    X-Ray Chest PA And Lateral  Order: [id number]  Status: Final result       Visible to patient: Yes (seen)       Next appt: 12/11/2023 at 08:00 AM in Otolaryngology (Phoenix Velasquez MD)       Dx: Cough, unspecified type    2 Result Notes  Details    Reading Physician Reading Date Result Priority   Braxton Cason MD  [phone] 10/5/2023      Narrative & Impression  EXAM: XR CHEST PA AND LATERAL     CLINICAL HISTORY:   [R05.9]-Cough, unspecified.     COMPARISON:  None available.     FINDINGS:  2 view chest.  Median sternotomy wires.  Mediastinal silhouette is within normal limits.  The lungs are clear.  No pneumothorax or pleural effusion.  No acute osseous finding.        Impression:     No acute finding in the chest.     Finalized on: 10/5/2023 8:36 AM By:  Braxton Cason MD  [facility]# [id number]      2023-10-05 08:39:[id number]    [facility]           Exam Ended: 10/05/23 08:30           The patient's Health Maintenance was reviewed and the following appears to be due:   Health Maintenance Due   Topic Date Due    RSV Vaccine (Age 60+ and Pregnant patients) (1 - 1-dose 60+ series) Never done    Influenza Vaccine (1) 09/01/2023    COVID-19 Vaccine (4 - 2023-24 season) 09/01/2023       Past Medical History:  Past Medical History:   Diagnosis Date    *Atrial flutter     s/p ablation    Abnormal Pap smear of cervix     ASD (atrial septal defect), ostium secundum     repaired    Benign hypertensive heart disease without heart failure     Endometriosis     Fatty liver     Hyperlipidemia     Hypertension     2009    Nodular goiter (nontoxic)     1999    Special screening for malignant neoplasms, colon 10/28/2014     Past Surgical History:   Procedure Laterality Date    ABLATION OF DYSRHYTHMIC FOCUS      ASD REPAIR, SECUNDUM      CARPAL TUNNEL RELEASE  2007    right hand     COLONOSCOPY N/A 1/14/2020    Procedure: COLONOSCOPY;  Surgeon: Latrell Banks MD;  Location: [facility];  Service: Endoscopy;  Laterality: N/A;    EYE SURGERY  11/2019 12/2019 right eye     Heart ablation  2011    OOPHORECTOMY      Open Heart Surgery  1970    OVARIAN CYST SURGERY  1981     Review of patient's allergies indicates:   Allergen Reactions    No known drug allergies      Current Outpatient Medications on File Prior to Visit   Medication Sig Dispense Refill    ANTIOX #11/OM3/DHA/EPA/LUT/NAMAN (50+ ADULT EYE HEALTH ORAL) Take by mouth.      aspirin 81 MG chewable tablet Take 81 mg by mouth once daily.      cetirizine 10 mg Cap Zyrtec   1 po q day      ipratropium (ATROVENT) 42 mcg (0.06 %) nasal spray 2 sprays by Each Nostril route 2 (two) times daily as needed for Rhinitis. 15 mL 6    MAGNESIUM ORAL Take by mouth once daily.      mometasone (ELOCON) 0.1 % ointment aaa bid x 1-2 weeks then prn 45 g 0    [DISCONTINUED] furosemide (LASIX) 40 MG tablet TAKE 1 TABLET (40 MG) BY MOUTH 3 TIMES DAILY 270 tablet 3    [DISCONTINUED] guaiFENesin-codeine 100-10 mg/5 ml (TUSSI-ORGANIDIN NR)  mg/5 mL syrup Take 5-10 ml po every 6 hours as needed for cough 118 mL 0    [DISCONTINUED] levothyroxine (SYNTHROID) 150 MCG tablet Take 1 tablet (150 mcg total) by mouth before breakfast. 90 tablet 3    [DISCONTINUED] metoprolol succinate (TOPROL-XL) 100 MG 24 hr tablet Take 1 tablet (100 mg total) by mouth once daily. 90 tablet 3    [DISCONTINUED] potassium chloride SA (K-DUR,KLOR-CON) 20 MEQ tablet Take 1 tablet (20 mEq total) by mouth once daily. 90 tablet 3    [DISCONTINUED] rosuvastatin (CRESTOR) 10 MG tablet Take 1 tablet (10 mg total) by mouth once daily. 90 tablet 3    [DISCONTINUED] fluticasone propionate (FLONASE) 50 mcg/actuation nasal spray Use 2 sprays to each nostril daily (Patient not taking: Reported on 12/6/2023) 16 g 0     No current facility-administered medications on file prior to visit.     Social  History     Socioeconomic History    Marital status:      Spouse name: Alexi    Number of children: 2   Occupational History    Occupation:      Employer: [employer]   Tobacco Use    Smoking status: Never    Smokeless tobacco: Never   Substance and Sexual Activity    Alcohol use: Yes     Comment: occasional glass of red wine    Drug use: No    Sexual activity: Not Currently     Partners: Male     Birth control/protection: Post-menopausal     Social Determinants of Health     Financial Resource Strain: Low Risk  (11/6/2023)    Overall Financial Resource Strain (CARDIA)     Difficulty of Paying Living Expenses: Not hard at all   Food Insecurity: No Food Insecurity (11/6/2023)    Hunger Vital Sign     Worried About Running Out of Food in the Last Year: Never true     Ran Out of Food in the Last Year: Never true   Transportation Needs: No Transportation Needs (11/6/2023)    PRAPARE - Transportation     Lack of Transportation (Medical): No     Lack of Transportation (Non-Medical): No   Physical Activity: Sufficiently Active (11/6/2023)    Exercise Vital Sign     Days of Exercise per Week: 5 days     Minutes of Exercise per Session: 30 min   Recent Concern: Physical Activity - Insufficiently Active (10/4/2023)    Exercise Vital Sign     Days of Exercise per Week: 4 days     Minutes of Exercise per Session: 30 min   Stress: No Stress Concern Present (11/6/2023)    [employer] - Occupational Stress Questionnaire     Feeling of Stress : Only a little   Social Connections: Unknown (11/6/2023)    Social Connection and Isolation Panel [NHANES]     Frequency of Communication with Friends and Family: More than three times a week     Frequency of Social Gatherings with Friends and Family: Once a week     Active Member of Clubs or Organizations: Yes     Attends Club or Organization Meetings: 1 to 4 times per year     Marital Status:    Housing Stability: Low Risk   "(11/6/2023)    Housing Stability Vital Sign     Unable to Pay for Housing in the Last Year: No     Number of Places Lived in the Last Year: 1     Unstable Housing in the Last Year: No     Family History   Problem Relation Age of Onset    Hypertension Mother     Arthritis Mother     Hearing loss Mother         cholesteatoma surgery    Cancer Father         colon    Diabetes Father     Breast cancer Sister     Cancer Sister         Breast    Aneurysm Brother         this was of his heart.    Birth defects Brother         Heart murmer    Heart disease Brother     Hypertension Brother     Cancer Brother         Prostate Cancer    Diabetes Brother     Diabetes Brother     Hypertension Brother     Diabetes Sister     Heart disease Sister     Hypertension Sister     Stroke Sister     Melanoma Neg Hx        Review of Systems    Objective:   /68 (BP Location: Left arm, Patient Position: Sitting, BP Method: Medium (Automatic))   Pulse 68   Resp 14   Ht 5' 2" (1.575 m)   Wt 79.4 kg (175 lb)   LMP 09/20/2011   BMI 32.01 kg/m²     Physical Exam  Assessment:     1. Pure hypercholesterolemia    2. Essential hypertension    3. Paroxysmal supraventricular tachycardia    4. Hypertension, unspecified type    5. Toxic diffuse goiter    6. Allergic rhinitis, unspecified seasonality, unspecified trigger    7. Chronic cough    8. Acute bacterial sinusitis    9. Pancreatic cyst      Plan:   I have discontinued Mallory David's levothyroxine. I am also having her start on levothyroxine. Additionally, I am having her maintain her aspirin, ANTIOX #11/OM3/DHA/EPA/LUT/NAMAN (50+ ADULT EYE HEALTH ORAL), cetirizine, MAGNESIUM ORAL, mometasone, ipratropium, fluticasone propionate, furosemide, metoprolol succinate, potassium chloride SA, and rosuvastatin.  No problem-specific Assessment & Plan notes found for this encounter.      Follow up for [place] immunization(s) ordered.    Mallory was seen today for thyroid problem and annual " exam.    Diagnoses and all orders for this visit:    Pure hypercholesterolemia  -     rosuvastatin (CRESTOR) 10 MG tablet; Take 1 tablet (10 mg total) by mouth once daily.    Essential hypertension  -     furosemide (LASIX) 40 MG tablet; TAKE 1 TABLET (40 MG) BY MOUTH 3 TIMES DAILY  -     metoprolol succinate (TOPROL-XL) 100 MG 24 hr tablet; Take 1 tablet (100 mg total) by mouth once daily.  -     potassium chloride SA (K-DUR,KLOR-CON) 20 MEQ tablet; Take 1 tablet (20 mEq total) by mouth once daily.    Paroxysmal supraventricular tachycardia  -     metoprolol succinate (TOPROL-XL) 100 MG 24 hr tablet; Take 1 tablet (100 mg total) by mouth once daily.    Hypertension, unspecified type  Cont meds.     Toxic diffuse goiter  -     levothyroxine (SYNTHROID) 125 MCG tablet; Take 1 tablet (125 mcg total) by mouth before breakfast.  -     TSH; Future    Allergic rhinitis, unspecified seasonality, unspecified trigger  -     fluticasone propionate (FLONASE) 50 mcg/actuation nasal spray; Use 2 sprays to each nostril daily    Chronic cough  -     fluticasone propionate (FLONASE) 50 mcg/actuation nasal spray; Use 2 sprays to each nostril daily       Pancreatic cyst  -     MRI MRCP Without Contrast; Future    Other orders  -     Influenza - Quadrivalent (Adjuvanted); Future      Medications Ordered This Encounter   Medications    fluticasone propionate (FLONASE) 50 mcg/actuation nasal spray     Sig: Use 2 sprays to each nostril daily     Dispense:  16 g     Refill:  0    furosemide (LASIX) 40 MG tablet     Sig: TAKE 1 TABLET (40 MG) BY MOUTH 3 TIMES DAILY     Dispense:  270 tablet     Refill:  3     This prescription was filled on 4/24/2023. Any refills authorized will be placed on file.    levothyroxine (SYNTHROID) 125 MCG tablet     Sig: Take 1 tablet (125 mcg total) by mouth before breakfast.     Dispense:  30 tablet     Refill:  11    metoprolol succinate (TOPROL-XL) 100 MG 24 hr tablet     Sig: Take 1 tablet (100 mg total)  by mouth once daily.     Dispense:  90 tablet     Refill:  3     This prescription was filled on 3/11/2023. Any refills authorized will be placed on file.    potassium chloride SA (K-DUR,KLOR-CON) 20 MEQ tablet     Sig: Take 1 tablet (20 mEq total) by mouth once daily.     Dispense:  90 tablet     Refill:  3     This prescription was filled on 9/16/2022. Any refills authorized will be placed on file.    rosuvastatin (CRESTOR) 10 MG tablet     Sig: Take 1 tablet (10 mg total) by mouth once daily.     Dispense:  90 tablet     Refill:  3     The patient was instructed to stop the following meds:  Medications Discontinued During This Encounter   Medication Reason    guaiFENesin-codeine 100-10 mg/5 ml (TUSSI-ORGANIDIN NR)  mg/5 mL syrup Patient no longer taking    levothyroxine (SYNTHROID) 150 MCG tablet     potassium chloride SA (K-DUR,KLOR-CON) 20 MEQ tablet Reorder    rosuvastatin (CRESTOR) 10 MG tablet Reorder    metoprolol succinate (TOPROL-XL) 100 MG 24 hr tablet Reorder    furosemide (LASIX) 40 MG tablet Reorder    fluticasone propionate (FLONASE) 50 mcg/actuation nasal spray Reorder     Orders Placed This Encounter   Procedures    MRI MRCP Without Contrast     Standing Status:   Future     Standing Expiration Date:   12/6/2024     Order Specific Question:   Does the patient have or ever had a pacemaker or a defibrillator (Note: Some facilities may not be able to schedule an MRI for patients with pacemakers and defibrillators. You should contact your local radiology dept to determine if this is the case.)?     Answer:   No     Order Specific Question:   Does the patient have an aneurysm or surgical clip, pump, nerve/brain stimulator, middle/inner ear prosthesis, or other metal implant or foreign object (bullet, shrapnel)? If they have a card related to their implant, ask them to bring it. Issues related to the implant may cause the MRI to be delayed.     Answer:   No     Order Specific Question:   Will the  patient require sedation?     Answer:   No     Order Specific Question:   May the Radiologist modify the order per protocol to meet the clinical needs of the patient?     Answer:   Yes     Order Specific Question:   Does the patient have on a skin patch for medication with aluminized backing?     Answer:   No    Influenza - Quadrivalent (Adjuvanted)     Administer the flu vaccine to the patient in the office.     Standing Status:   Future     Standing Expiration Date:   12/6/2024    TSH     Standing Status:   Future     Standing Expiration Date:   3/5/2024       Medication List with Changes/Refills   New Medications    LEVOTHYROXINE (SYNTHROID) 125 MCG TABLET    Take 1 tablet (125 mcg total) by mouth before breakfast.   Current Medications    ANTIOX #11/OM3/DHA/EPA/LUT/NAMAN (50+ ADULT EYE HEALTH ORAL)    Take by mouth.    ASPIRIN 81 MG CHEWABLE TABLET    Take 81 mg by mouth once daily.    CETIRIZINE 10 MG CAP    Zyrtec   1 po q day    IPRATROPIUM (ATROVENT) 42 MCG (0.06 %) NASAL SPRAY    2 sprays by Each Nostril route 2 (two) times daily as needed for Rhinitis.    MAGNESIUM ORAL    Take by mouth once daily.    MOMETASONE (ELOCON) 0.1 % OINTMENT    aaa bid x 1-2 weeks then prn   Changed and/or Refilled Medications    Modified Medication Previous Medication    FLUTICASONE PROPIONATE (FLONASE) 50 MCG/ACTUATION NASAL SPRAY fluticasone propionate (FLONASE) 50 mcg/actuation nasal spray       Use 2 sprays to each nostril daily    Use 2 sprays to each nostril daily    FUROSEMIDE (LASIX) 40 MG TABLET furosemide (LASIX) 40 MG tablet       TAKE 1 TABLET (40 MG) BY MOUTH 3 TIMES DAILY    TAKE 1 TABLET (40 MG) BY MOUTH 3 TIMES DAILY    METOPROLOL SUCCINATE (TOPROL-XL) 100 MG 24 HR TABLET metoprolol succinate (TOPROL-XL) 100 MG 24 hr tablet       Take 1 tablet (100 mg total) by mouth once daily.    Take 1 tablet (100 mg total) by mouth once daily.    POTASSIUM CHLORIDE SA (K-DUR,KLOR-CON) 20 MEQ TABLET potassium chloride SA  (K-DUR,KLOR-CON) 20 MEQ tablet       Take 1 tablet (20 mEq total) by mouth once daily.    Take 1 tablet (20 mEq total) by mouth once daily.    ROSUVASTATIN (CRESTOR) 10 MG TABLET rosuvastatin (CRESTOR) 10 MG tablet       Take 1 tablet (10 mg total) by mouth once daily.    Take 1 tablet (10 mg total) by mouth once daily.   Discontinued Medications    GUAIFENESIN-CODEINE 100-10 MG/5 ML (TUSSI-ORGANIDIN NR)  MG/5 ML SYRUP    Take 5-10 ml po every 6 hours as needed for cough    LEVOTHYROXINE (SYNTHROID) 150 MCG TABLET    Take 1 tablet (150 mcg total) by mouth before breakfast.      Medication List with Changes/Refills   New Medications    LEVOTHYROXINE (SYNTHROID) 125 MCG TABLET    Take 1 tablet (125 mcg total) by mouth before breakfast.       Start Date: 12/6/2023 End Date: 12/5/2024   Current Medications    ANTIOX #11/OM3/DHA/EPA/LUT/NAMAN (50+ ADULT EYE HEALTH ORAL)    Take by mouth.       Start Date: --        End Date: --    ASPIRIN 81 MG CHEWABLE TABLET    Take 81 mg by mouth once daily.       Start Date: 12/8/2009 End Date: --    CETIRIZINE 10 MG CAP    Zyrtec   1 po q day       Start Date: --        End Date: --    IPRATROPIUM (ATROVENT) 42 MCG (0.06 %) NASAL SPRAY    2 sprays by Each Nostril route 2 (two) times daily as needed for Rhinitis.       Start Date: 10/10/2023End Date: --    MAGNESIUM ORAL    Take by mouth once daily.       Start Date: --        End Date: --    MOMETASONE (ELOCON) 0.1 % OINTMENT    aaa bid x 1-2 weeks then prn       Start Date: 5/17/2022 End Date: --   Changed and/or Refilled Medications    Modified Medication Previous Medication    FLUTICASONE PROPIONATE (FLONASE) 50 MCG/ACTUATION NASAL SPRAY fluticasone propionate (FLONASE) 50 mcg/actuation nasal spray       Use 2 sprays to each nostril daily    Use 2 sprays to each nostril daily       Start Date: 12/6/2023 End Date: --    Start Date: 4/24/2023 End Date: 12/6/2023    FUROSEMIDE (LASIX) 40 MG TABLET furosemide (LASIX) 40 MG tablet        TAKE 1 TABLET (40 MG) BY MOUTH 3 TIMES DAILY    TAKE 1 TABLET (40 MG) BY MOUTH 3 TIMES DAILY       Start Date: 12/6/2023 End Date: --    Start Date: 4/25/2023 End Date: 12/6/2023    METOPROLOL SUCCINATE (TOPROL-XL) 100 MG 24 HR TABLET metoprolol succinate (TOPROL-XL) 100 MG 24 hr tablet       Take 1 tablet (100 mg total) by mouth once daily.    Take 1 tablet (100 mg total) by mouth once daily.       Start Date: 12/6/2023 End Date: --    Start Date: 3/14/2023 End Date: 12/6/2023    POTASSIUM CHLORIDE SA (K-DUR,KLOR-CON) 20 MEQ TABLET potassium chloride SA (K-DUR,KLOR-CON) 20 MEQ tablet       Take 1 tablet (20 mEq total) by mouth once daily.    Take 1 tablet (20 mEq total) by mouth once daily.       Start Date: 12/6/2023 End Date: --    Start Date: 11/10/2022End Date: 12/6/2023    ROSUVASTATIN (CRESTOR) 10 MG TABLET rosuvastatin (CRESTOR) 10 MG tablet       Take 1 tablet (10 mg total) by mouth once daily.    Take 1 tablet (10 mg total) by mouth once daily.       Start Date: 12/6/2023 End Date: --    Start Date: 11/10/2022End Date: 12/6/2023   Discontinued Medications    GUAIFENESIN-CODEINE 100-10 MG/5 ML (TUSSI-ORGANIDIN NR)  MG/5 ML SYRUP    Take 5-10 ml po every 6 hours as needed for cough       Start Date: 4/24/2023 End Date: 12/6/2023    LEVOTHYROXINE (SYNTHROID) 150 MCG TABLET    Take 1 tablet (150 mcg total) by mouth before breakfast.       Start Date: 5/15/2023 End Date: 12/6/2023            Start flonase for the cough.   Change her thyroid medicine and recheck that in 2 months. Give a flu shot today.

## 2023-12-11 ENCOUNTER — OFFICE VISIT (OUTPATIENT)
Dept: OTOLARYNGOLOGY | Facility: CLINIC | Age: 68
End: 2023-12-11
Payer: COMMERCIAL

## 2023-12-11 VITALS — WEIGHT: 175.5 LBS | HEIGHT: 62 IN | BODY MASS INDEX: 32.3 KG/M2

## 2023-12-11 DIAGNOSIS — J32.0 CHRONIC MAXILLARY SINUSITIS: Primary | ICD-10-CM

## 2023-12-11 DIAGNOSIS — R09.82 POST-NASAL DRIP: ICD-10-CM

## 2023-12-11 DIAGNOSIS — J31.0 RHINITIS, UNSPECIFIED TYPE: ICD-10-CM

## 2023-12-11 DIAGNOSIS — J34.3 HYPERTROPHY OF BOTH INFERIOR NASAL TURBINATES: ICD-10-CM

## 2023-12-11 PROCEDURE — 3008F BODY MASS INDEX DOCD: CPT | Mod: CPTII,S$GLB,, | Performed by: OTOLARYNGOLOGY

## 2023-12-11 PROCEDURE — 1160F PR REVIEW ALL MEDS BY PRESCRIBER/CLIN PHARMACIST DOCUMENTED: ICD-10-PCS | Mod: CPTII,S$GLB,, | Performed by: OTOLARYNGOLOGY

## 2023-12-11 PROCEDURE — 3288F PR FALLS RISK ASSESSMENT DOCUMENTED: ICD-10-PCS | Mod: CPTII,S$GLB,, | Performed by: OTOLARYNGOLOGY

## 2023-12-11 PROCEDURE — 3288F FALL RISK ASSESSMENT DOCD: CPT | Mod: CPTII,S$GLB,, | Performed by: OTOLARYNGOLOGY

## 2023-12-11 PROCEDURE — 1101F PT FALLS ASSESS-DOCD LE1/YR: CPT | Mod: CPTII,S$GLB,, | Performed by: OTOLARYNGOLOGY

## 2023-12-11 PROCEDURE — 1159F PR MEDICATION LIST DOCUMENTED IN MEDICAL RECORD: ICD-10-PCS | Mod: CPTII,S$GLB,, | Performed by: OTOLARYNGOLOGY

## 2023-12-11 PROCEDURE — 1126F AMNT PAIN NOTED NONE PRSNT: CPT | Mod: CPTII,S$GLB,, | Performed by: OTOLARYNGOLOGY

## 2023-12-11 PROCEDURE — 99214 OFFICE O/P EST MOD 30 MIN: CPT | Mod: S$GLB,,, | Performed by: OTOLARYNGOLOGY

## 2023-12-11 PROCEDURE — 99999 PR PBB SHADOW E&M-EST. PATIENT-LVL III: ICD-10-PCS | Mod: PBBFAC,,, | Performed by: OTOLARYNGOLOGY

## 2023-12-11 PROCEDURE — 99999 PR PBB SHADOW E&M-EST. PATIENT-LVL III: CPT | Mod: PBBFAC,,, | Performed by: OTOLARYNGOLOGY

## 2023-12-11 PROCEDURE — 1126F PR PAIN SEVERITY QUANTIFIED, NO PAIN PRESENT: ICD-10-PCS | Mod: CPTII,S$GLB,, | Performed by: OTOLARYNGOLOGY

## 2023-12-11 PROCEDURE — 1101F PR PT FALLS ASSESS DOC 0-1 FALLS W/OUT INJ PAST YR: ICD-10-PCS | Mod: CPTII,S$GLB,, | Performed by: OTOLARYNGOLOGY

## 2023-12-11 PROCEDURE — 3044F HG A1C LEVEL LT 7.0%: CPT | Mod: CPTII,S$GLB,, | Performed by: OTOLARYNGOLOGY

## 2023-12-11 PROCEDURE — 3008F PR BODY MASS INDEX (BMI) DOCUMENTED: ICD-10-PCS | Mod: CPTII,S$GLB,, | Performed by: OTOLARYNGOLOGY

## 2023-12-11 PROCEDURE — 1159F MED LIST DOCD IN RCRD: CPT | Mod: CPTII,S$GLB,, | Performed by: OTOLARYNGOLOGY

## 2023-12-11 PROCEDURE — 1160F RVW MEDS BY RX/DR IN RCRD: CPT | Mod: CPTII,S$GLB,, | Performed by: OTOLARYNGOLOGY

## 2023-12-11 PROCEDURE — 99214 PR OFFICE/OUTPT VISIT, EST, LEVL IV, 30-39 MIN: ICD-10-PCS | Mod: S$GLB,,, | Performed by: OTOLARYNGOLOGY

## 2023-12-11 PROCEDURE — 3044F PR MOST RECENT HEMOGLOBIN A1C LEVEL <7.0%: ICD-10-PCS | Mod: CPTII,S$GLB,, | Performed by: OTOLARYNGOLOGY

## 2023-12-11 NOTE — PROGRESS NOTES
Subjective:       Patient ID: Mallory David is a 68 y.o. female.    Chief Complaint: Cough (2mo deedee cough - still lingering but is better ) and Consult (Jaw pain / swelling )    Mallory is here for follow-up of rhinitis and recurrent protacted cough.   Atrovent added last visit with continuation of saline and periodic Flonase.    She has been using saline irr once daily, Atrovent BID, and recently added Flonase back in.  Cough seems to be a better during the day.  Will get a bit hoarse at times.  She still feels like sinuses are messed up. Would like to clear them is able.     HPI 10/2023:  Mallory is here for cough.  Symptoms have been present for years but 2-3 times per year.  Has been present for 6 months, on and off. Began in Apr with an URI symptoms and given Azithromycin lasting for 3-4 weeks. Got better but then worse again in August. Tried Mucinex, given Omnicef. Cough lingered for about a month again but did clear up. She then developed what sounds like a conjunctivitis and treated more recently with another rd of oral antibiotics. Cough is back thought not as bad.  She localizes the symptoms to the throat area.  At times the cough may be productive, but other times it is dry.   She has some am post nasal drainage.  She uses a nasal lavage.  Has used Flonase intermittently in the past.   No tobacco history.     Patient validated questionnaires (if applicable):      %            No data to display                   No data to display                   No data to display                     Review of Systems   Constitutional: Negative for activity change and appetite change.   Respiratory: Negative for difficulty breathing and wheezing   Cardiovascular: Negative for chest pain.      Objective:        Constitutional:   Vital signs are normal. She appears well-developed and well-nourished.     Head:  Normocephalic and atraumatic.     Ears:  Hearing normal to normal and whispered voice; external ear normal  without scars, lesions, or masses; ear canal, tympanic membrane, and middle ear normal..     Nose:  Nose normal including turbinates, nasal mucosa, sinuses and nasal septum. Septal deviation (mild-mod R - still able to see middle meatus) present. Turbinate hypertrophy.      Mouth/Throat  Oropharynx clear and moist without lesions or asymmetry.     Neck:  Neck normal without thyromegaly masses, asymmetry, normal tracheal structure, crepitus, and tenderness.         Tests / Results:  none    Assessment:       1. Chronic maxillary sinusitis    2. Hypertrophy of both inferior nasal turbinates    3. Post-nasal drip    4. Rhinitis, unspecified type          Plan:       Continue Atrovent, saline, and Flonase  CT Sinus given symptoms  Briefly discussed options pending CT Sinus - consider nasal airway surgery, RhinAer, or others pending image

## 2023-12-15 ENCOUNTER — LAB VISIT (OUTPATIENT)
Dept: LAB | Facility: HOSPITAL | Age: 68
End: 2023-12-15
Attending: FAMILY MEDICINE
Payer: COMMERCIAL

## 2023-12-15 DIAGNOSIS — R73.09 ELEVATED GLUCOSE: ICD-10-CM

## 2023-12-15 LAB
ESTIMATED AVG GLUCOSE: 131 MG/DL (ref 68–131)
HBA1C MFR BLD: 6.2 % (ref 4–5.6)

## 2023-12-15 PROCEDURE — 36415 COLL VENOUS BLD VENIPUNCTURE: CPT | Mod: PO | Performed by: FAMILY MEDICINE

## 2023-12-15 PROCEDURE — 83036 HEMOGLOBIN GLYCOSYLATED A1C: CPT | Performed by: FAMILY MEDICINE

## 2023-12-20 ENCOUNTER — HOSPITAL ENCOUNTER (OUTPATIENT)
Dept: RADIOLOGY | Facility: HOSPITAL | Age: 68
Discharge: HOME | End: 2023-12-20
Attending: OTOLARYNGOLOGY
Payer: COMMERCIAL

## 2023-12-20 ENCOUNTER — HOSPITAL ENCOUNTER (OUTPATIENT)
Dept: RADIOLOGY | Facility: HOSPITAL | Age: 68
Discharge: HOME | End: 2023-12-20
Attending: FAMILY MEDICINE
Payer: COMMERCIAL

## 2023-12-20 DIAGNOSIS — K86.2 PANCREATIC CYST: ICD-10-CM

## 2023-12-20 DIAGNOSIS — J32.0 CHRONIC MAXILLARY SINUSITIS: ICD-10-CM

## 2023-12-20 PROCEDURE — 70486 CT MAXILLOFACIAL W/O DYE: CPT | Mod: TC,PO

## 2023-12-20 PROCEDURE — 70486 CT MAXILLOFACIAL W/O DYE: CPT | Mod: 26,,, | Performed by: RADIOLOGY

## 2023-12-20 PROCEDURE — 76376 MRI ABDOMEN WITHOUT CONTRAST MRCP: ICD-10-PCS | Mod: 26,,, | Performed by: RADIOLOGY

## 2023-12-20 PROCEDURE — 74181 MRI ABDOMEN W/O CONTRAST: CPT | Mod: TC,PO

## 2023-12-20 PROCEDURE — 74181 MRI ABDOMEN W/O CONTRAST: CPT | Mod: 26,,, | Performed by: RADIOLOGY

## 2023-12-20 PROCEDURE — 76376 3D RENDER W/INTRP POSTPROCES: CPT | Mod: TC,PO

## 2023-12-20 PROCEDURE — 76376 3D RENDER W/INTRP POSTPROCES: CPT | Mod: 26,,, | Performed by: RADIOLOGY

## 2023-12-20 PROCEDURE — 70486 CT SINUSES WITHOUT CONTRAST: ICD-10-PCS | Mod: 26,,, | Performed by: RADIOLOGY

## 2023-12-20 PROCEDURE — 74181 MRI ABDOMEN WITHOUT CONTRAST MRCP: ICD-10-PCS | Mod: 26,,, | Performed by: RADIOLOGY

## 2024-01-04 ENCOUNTER — TELEPHONE (OUTPATIENT)
Dept: GASTROENTEROLOGY | Facility: CLINIC | Age: 69
End: 2024-01-04
Payer: COMMERCIAL

## 2024-01-04 NOTE — TELEPHONE ENCOUNTER
----- Message from Krystyna Damon sent at 1/4/2024  8:56 AM CST -----  Mallory David calling regarding wanting to inform Rachna that she will be at the appt on Monday 01/08

## 2024-01-08 ENCOUNTER — OFFICE VISIT (OUTPATIENT)
Dept: GASTROENTEROLOGY | Facility: CLINIC | Age: 69
End: 2024-01-08
Payer: COMMERCIAL

## 2024-01-08 DIAGNOSIS — K86.2 CYST OF PANCREAS: Primary | ICD-10-CM

## 2024-01-08 PROCEDURE — 1160F RVW MEDS BY RX/DR IN RCRD: CPT | Mod: CPTII,95,, | Performed by: INTERNAL MEDICINE

## 2024-01-08 PROCEDURE — 1159F MED LIST DOCD IN RCRD: CPT | Mod: CPTII,95,, | Performed by: INTERNAL MEDICINE

## 2024-01-08 PROCEDURE — 99213 OFFICE O/P EST LOW 20 MIN: CPT | Mod: 95,,, | Performed by: INTERNAL MEDICINE

## 2024-01-08 NOTE — PROGRESS NOTES
Gastroenterology: [facility]      SUBJECTIVE:         Chief Complaint: Here for evaluation of a pancreatic cyst     History of Present Illness:  Patient is a 68 y.o. female presents with a pancreatic cyst. The cyst was first noted 2022. It's located in the the body.  It measures 6 mm  in size.  It is not symptomatic. Previous studies include MRI. She has had a follow up MRI in 12/2023 .   Since that time the cyst has not increased in size.    Previous FNA of the cyst has not been done    There is not a family history of pancreatic cancer     The patient does not smoke.      OBJECTIVE:       Diagnostic Results:  MRI: Reviewed        ASSESSMENT/PLAN:     Pancreatic cyst in the the body. Measuring 6mm in size,  unchanged    Year 2  We discussed in detail the natural history of pancreatic cysts, the therapy involved, and the benefits of multimodality imaging including Ct, MRI, and EUS with FNA    Plan:   We will be discussing her case at the pancreactic cyst clinic multidisciplinary conference to finalize a treatment plan.[facility]    She will check in via virtual visit in a year to get the the MRI scheduled    We spent 30 minutes in today's clinic with greater than 50% of the time dedicated to counseling and arranging care.

## 2024-01-31 ENCOUNTER — PATIENT MESSAGE (OUTPATIENT)
Dept: FAMILY MEDICINE | Facility: CLINIC | Age: 69
End: 2024-01-31
Payer: COMMERCIAL

## 2024-01-31 DIAGNOSIS — J30.9 ALLERGIC RHINITIS, UNSPECIFIED SEASONALITY, UNSPECIFIED TRIGGER: ICD-10-CM

## 2024-01-31 DIAGNOSIS — R05.3 CHRONIC COUGH: ICD-10-CM

## 2024-01-31 RX ORDER — FLUTICASONE PROPIONATE 50 MCG
SPRAY, SUSPENSION (ML) NASAL
Qty: 16 G | Refills: 11 | Status: SHIPPED | OUTPATIENT
Start: 2024-01-31

## 2024-01-31 NOTE — TELEPHONE ENCOUNTER
No care due was identified.  [facility] Embedded Care Due Messages. Reference number: [id number].   1/31/2024 10:25:52 AM CST

## 2024-02-01 NOTE — TELEPHONE ENCOUNTER
The patient requested medicine refills and I did refill it x 1 year.  Message the patient to notify of any health maintenance care gaps that need to be arranged.   Health Maintenance Due   Topic Date Due    RSV Vaccine (Age 60+ and Pregnant patients) (1 - 1-dose 60+ series) Never done    COVID-19 Vaccine (4 - 2023-24 season) 09/01/2023     BP Readings from Last 3 Encounters:   12/06/23 122/68   11/15/23 130/78   10/05/23 113/69     Lab Results   Component Value Date    HGBA1C 6.2 (H) 12/15/2023

## 2024-02-08 ENCOUNTER — LAB VISIT (OUTPATIENT)
Dept: LAB | Facility: HOSPITAL | Age: 69
End: 2024-02-08
Attending: FAMILY MEDICINE
Payer: COMMERCIAL

## 2024-02-08 DIAGNOSIS — E05.00 TOXIC DIFFUSE GOITER: ICD-10-CM

## 2024-02-08 LAB — TSH SERPL DL<=0.005 MIU/L-ACNC: 2.37 UIU/ML (ref 0.4–4)

## 2024-02-08 PROCEDURE — 84443 ASSAY THYROID STIM HORMONE: CPT | Performed by: FAMILY MEDICINE

## 2024-02-08 PROCEDURE — 36415 COLL VENOUS BLD VENIPUNCTURE: CPT | Mod: PO | Performed by: FAMILY MEDICINE

## 2024-02-14 ENCOUNTER — PATIENT MESSAGE (OUTPATIENT)
Dept: OTOLARYNGOLOGY | Facility: CLINIC | Age: 69
End: 2024-02-14
Payer: COMMERCIAL

## 2024-02-14 RX ORDER — AMOXICILLIN 875 MG/1
875 TABLET, FILM COATED ORAL EVERY 12 HOURS
Qty: 20 TABLET | Refills: 0 | Status: SHIPPED | OUTPATIENT
Start: 2024-02-14 | End: 2024-02-24

## 2024-02-26 DIAGNOSIS — E05.00 TOXIC DIFFUSE GOITER: ICD-10-CM

## 2024-02-27 RX ORDER — LEVOTHYROXINE SODIUM 125 UG/1
125 TABLET ORAL
Qty: 90 TABLET | Refills: 3 | Status: SHIPPED | OUTPATIENT
Start: 2024-02-27

## 2024-02-27 NOTE — TELEPHONE ENCOUNTER
Refill Routing Note   Medication(s) are not appropriate for processing by [facility] for the following reason(s):        New or recently adjusted medication    [facility] action(s):  Defer        Medication Therapy Plan: restarted dose on 12/06/2023      Appointments  past 12m or future 3m with PCP    Date Provider   Last Visit   12/6/2023 Latrell Banks MD   Next Visit   Visit date not found Latrell Banks MD   ED visits in past 90 days: 0        Note composed:9:44 AM 02/27/2024

## 2024-02-27 NOTE — TELEPHONE ENCOUNTER
No care due was identified.  [facility] Embedded Care Due Messages. Reference number: [id number].   2/26/2024 7:15:35 PM CST

## 2024-05-09 ENCOUNTER — OFFICE VISIT (OUTPATIENT)
Dept: FAMILY MEDICINE | Facility: CLINIC | Age: 69
End: 2024-05-09
Payer: COMMERCIAL

## 2024-05-09 ENCOUNTER — HOSPITAL ENCOUNTER (OUTPATIENT)
Dept: RADIOLOGY | Facility: HOSPITAL | Age: 69
Discharge: HOME | End: 2024-05-09
Attending: NURSE PRACTITIONER
Payer: COMMERCIAL

## 2024-05-09 VITALS
RESPIRATION RATE: 16 BRPM | WEIGHT: 180 LBS | HEIGHT: 62 IN | BODY MASS INDEX: 33.13 KG/M2 | HEART RATE: 63 BPM | DIASTOLIC BLOOD PRESSURE: 77 MMHG | SYSTOLIC BLOOD PRESSURE: 127 MMHG

## 2024-05-09 DIAGNOSIS — M79.672 FOOT PAIN, LEFT: ICD-10-CM

## 2024-05-09 DIAGNOSIS — M79.672 FOOT PAIN, LEFT: Primary | ICD-10-CM

## 2024-05-09 DIAGNOSIS — M25.572 LEFT ANKLE PAIN, UNSPECIFIED CHRONICITY: ICD-10-CM

## 2024-05-09 PROCEDURE — 1101F PT FALLS ASSESS-DOCD LE1/YR: CPT | Mod: CPTII,S$GLB,, | Performed by: NURSE PRACTITIONER

## 2024-05-09 PROCEDURE — 99999 PR PBB SHADOW E&M-EST. PATIENT-LVL V: CPT | Mod: PBBFAC,,, | Performed by: NURSE PRACTITIONER

## 2024-05-09 PROCEDURE — 1126F AMNT PAIN NOTED NONE PRSNT: CPT | Mod: CPTII,S$GLB,, | Performed by: NURSE PRACTITIONER

## 2024-05-09 PROCEDURE — 73610 X-RAY EXAM OF ANKLE: CPT | Mod: 26,LT,, | Performed by: RADIOLOGY

## 2024-05-09 PROCEDURE — 73630 X-RAY EXAM OF FOOT: CPT | Mod: 26,LT,, | Performed by: RADIOLOGY

## 2024-05-09 PROCEDURE — 73610 X-RAY EXAM OF ANKLE: CPT | Mod: TC,PO,LT

## 2024-05-09 PROCEDURE — 99213 OFFICE O/P EST LOW 20 MIN: CPT | Mod: S$GLB,,, | Performed by: NURSE PRACTITIONER

## 2024-05-09 PROCEDURE — 3008F BODY MASS INDEX DOCD: CPT | Mod: CPTII,S$GLB,, | Performed by: NURSE PRACTITIONER

## 2024-05-09 PROCEDURE — 3288F FALL RISK ASSESSMENT DOCD: CPT | Mod: CPTII,S$GLB,, | Performed by: NURSE PRACTITIONER

## 2024-05-09 PROCEDURE — 73630 X-RAY EXAM OF FOOT: CPT | Mod: TC,PO,LT

## 2024-05-09 PROCEDURE — 3074F SYST BP LT 130 MM HG: CPT | Mod: CPTII,S$GLB,, | Performed by: NURSE PRACTITIONER

## 2024-05-09 PROCEDURE — 1160F RVW MEDS BY RX/DR IN RCRD: CPT | Mod: CPTII,S$GLB,, | Performed by: NURSE PRACTITIONER

## 2024-05-09 PROCEDURE — 1159F MED LIST DOCD IN RCRD: CPT | Mod: CPTII,S$GLB,, | Performed by: NURSE PRACTITIONER

## 2024-05-09 PROCEDURE — 3078F DIAST BP <80 MM HG: CPT | Mod: CPTII,S$GLB,, | Performed by: NURSE PRACTITIONER

## 2024-05-09 RX ORDER — LEG BRACE
1 EACH MISCELLANEOUS DAILY
Qty: 1 EACH | Refills: 0 | Status: SHIPPED | OUTPATIENT
Start: 2024-05-09

## 2024-05-09 NOTE — PATIENT INSTRUCTIONS
Wear supportive shoes/arch support  Hydrate well  Motrin OTC as directed; no more than 1 w  Exercises as demonstrated in handout  Brace while standing/walking  F/U with podiatry  Report to ER immediately if symptoms worsen or persist

## 2024-05-09 NOTE — PROGRESS NOTES
Subjective     Patient ID: Mallory David is a 68 y.o. female.    Chief Complaint: Ankle Pain (L side )    Ankle Pain   The incident occurred more than 1 week ago. There was no injury mechanism. The pain is present in the left foot and left ankle. The quality of the pain is described as aching. The pain is mild. The pain has been Intermittent since onset. Pertinent negatives include no inability to bear weight, loss of motion, loss of sensation, muscle weakness, numbness or tingling. She reports no foreign bodies present. The symptoms are aggravated by movement and weight bearing. She has tried nothing for the symptoms. The treatment provided no relief.     Past Medical History:   Diagnosis Date    *Atrial flutter     s/p ablation    Abnormal Pap smear of cervix     ASD (atrial septal defect), ostium secundum     repaired    Benign hypertensive heart disease without heart failure     Endometriosis     Fatty liver     Hyperlipidemia     Hypertension     2009    Nodular goiter (nontoxic)     1999    Special screening for malignant neoplasms, colon 10/28/2014     Social History     Socioeconomic History    Marital status:      Spouse name: Alexi    Number of children: 2   Occupational History    Occupation:      Employer: [employer]   Tobacco Use    Smoking status: Never    Smokeless tobacco: Never   Substance and Sexual Activity    Alcohol use: Yes     Comment: occasional glass of red wine    Drug use: No    Sexual activity: Not Currently     Partners: Male     Birth control/protection: Post-menopausal     Social Determinants of Health     Financial Resource Strain: Low Risk  (1/4/2024)    Overall Financial Resource Strain (CARDIA)     Difficulty of Paying Living Expenses: Not hard at all   Food Insecurity: No Food Insecurity (1/4/2024)    Hunger Vital Sign     Worried About Running Out of Food in the Last Year: Never true     Ran Out of Food in the Last Year: Never true   Transportation  Needs: No Transportation Needs (1/4/2024)    PRAPARE - Transportation     Lack of Transportation (Medical): No     Lack of Transportation (Non-Medical): No   Physical Activity: Sufficiently Active (1/4/2024)    Exercise Vital Sign     Days of Exercise per Week: 5 days     Minutes of Exercise per Session: 30 min   Stress: No Stress Concern Present (1/4/2024)    [employer] [place] [employer] - Occupational Stress Questionnaire     Feeling of Stress : Not at all   Housing Stability: Low Risk  (1/4/2024)    Housing Stability Vital Sign     Unable to Pay for Housing in the Last Year: No     Number of Places Lived in the Last Year: 1     Unstable Housing in the Last Year: No     Past Surgical History:   Procedure Laterality Date    ABLATION OF DYSRHYTHMIC FOCUS      ASD REPAIR, SECUNDUM      CARPAL TUNNEL RELEASE  2007    right hand    COLONOSCOPY N/A 1/14/2020    Procedure: COLONOSCOPY;  Surgeon: Latrell Banks MD;  Location: [facility];  Service: Endoscopy;  Laterality: N/A;    EYE SURGERY  11/2019 12/2019 right eye     Heart ablation  2011    OOPHORECTOMY      Open Heart Surgery  1970    OVARIAN CYST SURGERY  1981       Review of Systems   Constitutional: Negative.    HENT: Negative.     Eyes: Negative.    Respiratory: Negative.     Cardiovascular: Negative.    Gastrointestinal: Negative.    Endocrine: Negative.    Genitourinary: Negative.    Musculoskeletal:  Positive for arthralgias (left ankle/foot).   Integumentary:  Negative.   Allergic/Immunologic: Negative.    Neurological: Negative.  Negative for tingling and numbness.   Psychiatric/Behavioral: Negative.            Objective     Physical Exam  Vitals and nursing note reviewed.   Constitutional:       Appearance: Normal appearance.   HENT:      Head: Normocephalic.      Right Ear: Tympanic membrane, ear canal and external ear normal.      Left Ear: Tympanic membrane, ear canal and external ear normal.      Nose: Nose normal.      Mouth/Throat:       Mouth: Mucous membranes are moist.      Pharynx: Oropharynx is clear.   Eyes:      Conjunctiva/sclera: Conjunctivae normal.      Pupils: Pupils are equal, round, and reactive to light.   Cardiovascular:      Rate and Rhythm: Normal rate and regular rhythm.      Pulses: Normal pulses.      Heart sounds: Normal heart sounds.   Pulmonary:      Effort: Pulmonary effort is normal.      Breath sounds: Normal breath sounds.   Abdominal:      General: Bowel sounds are normal.      Palpations: Abdomen is soft.   Musculoskeletal:         General: Normal range of motion.      Cervical back: Normal range of motion and neck supple.      Left ankle: No swelling, deformity, ecchymosis or lacerations. Tenderness present. Normal range of motion.      Left foot: Normal range of motion and normal capillary refill. Bony tenderness present. No swelling, deformity, bunion, Charcot foot, foot drop, prominent metatarsal heads, laceration, tenderness or crepitus. Normal pulse.   Skin:     General: Skin is warm and dry.      Capillary Refill: Capillary refill takes 2 to 3 seconds.   Neurological:      Mental Status: She is alert and oriented to person, place, and time.   Psychiatric:         Mood and Affect: Mood normal.         Behavior: Behavior normal.         Thought Content: Thought content normal.         Judgment: Judgment normal.            Assessment and Plan     1. Foot pain, left  2. Left ankle pain, unspecified chronicity  -     X-Ray Ankle Complete Left; Future; Expected date: 05/09/2024  -     X-Ray Foot Complete Left; Future; Expected date: 05/09/2024  -     Ambulatory referral/consult to Podiatry; Future; Expected date: 05/16/2024        -     leg brace (ANKLE BRACE) Misc; 1 each by Misc.(Non-Drug; Combo Route) route Daily.  Dispense: 1 each; Refill: 0  Wear supportive shoes/arch support  Hydrate well  Motrin OTC as directed; no more than 1 w  Exercises as demonstrated in handout  Brace while standing/walking  F/U with  podiatry  Report to ER immediately if symptoms worsen or persist               No follow-ups on file.

## 2024-05-14 ENCOUNTER — OFFICE VISIT (OUTPATIENT)
Dept: DERMATOLOGY | Facility: CLINIC | Age: 69
End: 2024-05-14
Payer: COMMERCIAL

## 2024-05-14 DIAGNOSIS — D23.9 DERMATOFIBROMA: ICD-10-CM

## 2024-05-14 DIAGNOSIS — D22.9 MULTIPLE BENIGN NEVI: ICD-10-CM

## 2024-05-14 DIAGNOSIS — Z12.83 SKIN CANCER SCREENING: Primary | ICD-10-CM

## 2024-05-14 DIAGNOSIS — L81.4 LENTIGINES: ICD-10-CM

## 2024-05-14 DIAGNOSIS — D18.01 CHERRY ANGIOMA: ICD-10-CM

## 2024-05-14 DIAGNOSIS — L82.1 SEBORRHEIC KERATOSIS: ICD-10-CM

## 2024-05-14 DIAGNOSIS — L82.0 INFLAMED SEBORRHEIC KERATOSIS: ICD-10-CM

## 2024-05-14 PROCEDURE — 99213 OFFICE O/P EST LOW 20 MIN: CPT | Mod: 25,S$GLB,, | Performed by: DERMATOLOGY

## 2024-05-14 PROCEDURE — 1160F RVW MEDS BY RX/DR IN RCRD: CPT | Mod: CPTII,S$GLB,, | Performed by: DERMATOLOGY

## 2024-05-14 PROCEDURE — 3288F FALL RISK ASSESSMENT DOCD: CPT | Mod: CPTII,S$GLB,, | Performed by: DERMATOLOGY

## 2024-05-14 PROCEDURE — 1101F PT FALLS ASSESS-DOCD LE1/YR: CPT | Mod: CPTII,S$GLB,, | Performed by: DERMATOLOGY

## 2024-05-14 PROCEDURE — 1159F MED LIST DOCD IN RCRD: CPT | Mod: CPTII,S$GLB,, | Performed by: DERMATOLOGY

## 2024-05-14 PROCEDURE — 99999 PR PBB SHADOW E&M-EST. PATIENT-LVL III: CPT | Mod: PBBFAC,,, | Performed by: DERMATOLOGY

## 2024-05-14 PROCEDURE — 17110 DESTRUCTION B9 LES UP TO 14: CPT | Mod: S$GLB,,, | Performed by: DERMATOLOGY

## 2024-05-14 NOTE — PROGRESS NOTES
Subjective:      Patient ID:  Mallorychiara David is a 68 y.o. female who presents for No chief complaint on file.    HPI  Established Patient  Here for 6 moth f/u [facility] 11/2023 with Dr. Sanchez  Dark spot on face she noticed. Growing. Raised.   No concerns today    no Phx of NMSC.  Fhx of NMSC  + Mother and Father hx of BCC  no Fhx of melanoma  Review of Systems   Constitutional:  Negative for fever, chills and fatigue.   Skin:  Positive for daily sunscreen use, activity-related sunscreen use and wears hat.   Hematologic/Lymphatic: Does not bruise/bleed easily.       Objective:   Physical Exam   Constitutional: She appears well-developed and well-nourished. No distress.   HENT:   Head:       Mouth/Throat: Lips normal.    Eyes: Lids are normal.  No conjunctival no injection.   Cardiovascular:  There is no local extremity swelling and no dependent edema.             Neurological: She is alert and oriented to person, place, and time. She is not disoriented.   Psychiatric: She has a normal mood and affect.   Skin:   Areas Examined (abnormalities noted in diagram):   Scalp / Hair Palpated and Inspected  Head / Face Inspection Performed  Neck Inspection Performed  Chest / Axilla Inspection Performed  Abdomen Inspection Performed  Genitals / Buttocks / Groin Inspection Performed  Back Inspection Performed  RUE Inspected  LUE Inspection Performed  RLE Inspected  LLE Inspection Performed  Nails and Digits Inspection Performed                 Diagram Legend     Erythematous scaling macule/papule c/w actinic keratosis       Vascular papule c/w angioma      Pigmented verrucoid papule/plaque c/w seborrheic keratosis      Yellow umbilicated papule c/w sebaceous hyperplasia      Irregularly shaped tan macule c/w lentigo     1-2 mm smooth white papules consistent with Milia      Movable subcutaneous cyst with punctum c/w epidermal inclusion cyst      Subcutaneous movable cyst c/w pilar cyst      Firm pink to brown papule c/w  dermatofibroma      Pedunculated fleshy papule(s) c/w skin tag(s)      Evenly pigmented macule c/w junctional nevus     Mildly variegated pigmented, slightly irregular-bordered macule c/w mildly atypical nevus      Flesh colored to evenly pigmented papule c/w intradermal nevus       Pink pearly papule/plaque c/w basal cell carcinoma      Erythematous hyperkeratotic cursted plaque c/w SCC      Surgical scar with no sign of skin cancer recurrence      Open and closed comedones      Inflammatory papules and pustules      Verrucoid papule consistent consistent with wart     Erythematous eczematous patches and plaques     Dystrophic onycholytic nail with subungual debris c/w onychomycosis     Umbilicated papule    Erythematous-base heme-crusted tan verrucoid plaque consistent with inflamed seborrheic keratosis     Erythematous Silvery Scaling Plaque c/w Psoriasis     See annotation      Assessment / Plan:        Skin cancer screening  Discussed ABCDE's of nevi.  Monitor for new mole or moles that are becoming bigger, darker, irritated, or developing irregular borders.       Multiple benign nevi  Discussed ABCDE's of nevi.  Monitor for new mole or moles that are becoming bigger, darker, irritated, or developing irregular borders.       Cherry angioma  This is a benign vascular lesion. Reassurance given. No treatment required.       Seborrheic keratosis  These are benign inherited growths without a malignant potential. Reassurance given to patient. No treatment is necessary.       Lentigines  This is a benign hyperpigmented sun induced lesion. Daily sun protection will reduce the number of new lesions. Treatment of these benign lesions are considered cosmetic.      Dermatofibroma  Reassurance given to patient. No treatment is necessary.   Treatment of benign, asymptomatic lesions may be considered cosmetic.      Inflamed seborrheic keratosis  Cryosurgery procedure note:    Verbal consent from the patient is obtained.  Liquid nitrogen cryosurgery is applied to 1 lesions to produce a freeze injury. The patient is aware that blisters may form and is instructed on wound care with gentle cleansing and use of vaseline ointment to keep moist until healed. The patient is supplied a handout on cryosurgery and is instructed to call if lesions do not completely resolve. Risk of dyspigmentation discussed.                Follow up in about 6 months (around 11/14/2024).

## 2024-05-20 ENCOUNTER — OFFICE VISIT (OUTPATIENT)
Dept: PODIATRY | Facility: CLINIC | Age: 69
End: 2024-05-20
Payer: COMMERCIAL

## 2024-05-20 VITALS — HEIGHT: 62 IN | BODY MASS INDEX: 33.1 KG/M2 | WEIGHT: 179.88 LBS

## 2024-05-20 DIAGNOSIS — M72.2 PLANTAR FASCIITIS: Primary | ICD-10-CM

## 2024-05-20 DIAGNOSIS — M25.572 LEFT ANKLE PAIN, UNSPECIFIED CHRONICITY: ICD-10-CM

## 2024-05-20 DIAGNOSIS — M79.672 FOOT PAIN, LEFT: ICD-10-CM

## 2024-05-20 PROCEDURE — 1160F RVW MEDS BY RX/DR IN RCRD: CPT | Mod: CPTII,S$GLB,, | Performed by: STUDENT IN AN ORGANIZED HEALTH CARE EDUCATION/TRAINING PROGRAM

## 2024-05-20 PROCEDURE — 99999 PR PBB SHADOW E&M-EST. PATIENT-LVL IV: CPT | Mod: PBBFAC,,, | Performed by: STUDENT IN AN ORGANIZED HEALTH CARE EDUCATION/TRAINING PROGRAM

## 2024-05-20 PROCEDURE — 3008F BODY MASS INDEX DOCD: CPT | Mod: CPTII,S$GLB,, | Performed by: STUDENT IN AN ORGANIZED HEALTH CARE EDUCATION/TRAINING PROGRAM

## 2024-05-20 PROCEDURE — 99203 OFFICE O/P NEW LOW 30 MIN: CPT | Mod: 25,S$GLB,, | Performed by: STUDENT IN AN ORGANIZED HEALTH CARE EDUCATION/TRAINING PROGRAM

## 2024-05-20 PROCEDURE — 1125F AMNT PAIN NOTED PAIN PRSNT: CPT | Mod: CPTII,S$GLB,, | Performed by: STUDENT IN AN ORGANIZED HEALTH CARE EDUCATION/TRAINING PROGRAM

## 2024-05-20 PROCEDURE — 1101F PT FALLS ASSESS-DOCD LE1/YR: CPT | Mod: CPTII,S$GLB,, | Performed by: STUDENT IN AN ORGANIZED HEALTH CARE EDUCATION/TRAINING PROGRAM

## 2024-05-20 PROCEDURE — 20550 NJX 1 TENDON SHEATH/LIGAMENT: CPT | Mod: LT,S$GLB,, | Performed by: STUDENT IN AN ORGANIZED HEALTH CARE EDUCATION/TRAINING PROGRAM

## 2024-05-20 PROCEDURE — 1159F MED LIST DOCD IN RCRD: CPT | Mod: CPTII,S$GLB,, | Performed by: STUDENT IN AN ORGANIZED HEALTH CARE EDUCATION/TRAINING PROGRAM

## 2024-05-20 PROCEDURE — 3288F FALL RISK ASSESSMENT DOCD: CPT | Mod: CPTII,S$GLB,, | Performed by: STUDENT IN AN ORGANIZED HEALTH CARE EDUCATION/TRAINING PROGRAM

## 2024-05-20 RX ORDER — TRIAMCINOLONE ACETONIDE 40 MG/ML
40 INJECTION, SUSPENSION INTRA-ARTICULAR; INTRAMUSCULAR
Status: COMPLETED | OUTPATIENT
Start: 2024-05-20 | End: 2024-05-20

## 2024-05-20 RX ADMIN — TRIAMCINOLONE ACETONIDE 40 MG: 40 INJECTION, SUSPENSION INTRA-ARTICULAR; INTRAMUSCULAR at 09:05

## 2024-05-21 NOTE — PROGRESS NOTES
Chief Complaint   Patient presents with    Heel Pain         MEDICAL DECISION MAKING         1. Foot pain, left  -     Ambulatory referral/consult to Podiatry    2. Left ankle pain, unspecified chronicity  -     Ambulatory referral/consult to Podiatry           I counseled the patient on the patient's conditions, their implications and medical management.     Plantar Fasciitis:  -Patient was given a printout of stretching exercises to stretch the achilles tendon and increase ankle dorsiflexion  -Patient instructed to ice with a frozen water bottle as instructed in the handout.  -Patient instructed to refrain from barefoot walking. I recommend Hoka slippers.  -Shoe gear was discussed with patient and recommended a supportive shoe with a stiff shank that doesn't flex at the arch. The shoe should also have an elevated heel. Some brands include Michaels, Asics and Hokas. The length of the shoe should accommodate the width of a thumb between the big toe and the edge of the shoe.    Patient to follow up in about 6 weeks.     Plantar Fascia    Date/Time: 5/20/2024 1:20 PM    Performed by: Billy Osullivan DPM  Authorized by: Billy Osullivan DPM    Consent Done?:  Yes (Verbal)  Indications:  Pain  Location:  Foot  Foot joint: L heel.  Ultrasonic guidance for needle placement?: No    Needle size:  25 G  Approach:  Plantar  Patient tolerance:  Patient tolerated the procedure well with no immediate complications        Billy Osullivan DPM        HPI:       Mallory David is a 68 y.o. female who presents to clinic with concerns of left heel pain for about 2 weeks or more.  Pain is worse with weight bearing and first few steps after prolonged periods of rest.     Patient denies acute trauma to the affected area.   Treatment tried: stretching with some improvement      Patient Active Problem List   Diagnosis    Hemorrhoid    Ostium secundum type atrial septal defect    SVT (supraventricular tachycardia)    Toxic diffuse goiter     Hypertension    Goiter    ASD (atrial septal defect), ostium secundum    Hyperlipidemia    History of colon polyps    Special screening for malignant neoplasms, colon    Colon polyp    Fatty liver    Ganglion    Pancreatic cyst         Current Outpatient Medications on File Prior to Visit   Medication Sig Dispense Refill    ANTIOX #11/OM3/DHA/EPA/LUT/NAMAN (50+ ADULT EYE HEALTH ORAL) Take by mouth.      aspirin 81 MG chewable tablet Take 81 mg by mouth once daily.      cetirizine 10 mg Cap Zyrtec   1 po q day      fluticasone propionate (FLONASE) 50 mcg/actuation nasal spray Use 2 sprays to each nostril daily 16 g 11    furosemide (LASIX) 40 MG tablet TAKE 1 TABLET (40 MG) BY MOUTH 3 TIMES DAILY 270 tablet 3    ipratropium (ATROVENT) 42 mcg (0.06 %) nasal spray 2 sprays by Each Nostril route 2 (two) times daily as needed for Rhinitis. 15 mL 6    leg brace (ANKLE BRACE) Misc 1 each by Misc.(Non-Drug; Combo Route) route Daily. 1 each 0    levothyroxine (SYNTHROID) 125 MCG tablet Take 1 tablet (125 mcg total) by mouth before breakfast. 90 tablet 3    MAGNESIUM ORAL Take by mouth once daily.      metoprolol succinate (TOPROL-XL) 100 MG 24 hr tablet Take 1 tablet (100 mg total) by mouth once daily. 90 tablet 3    mometasone (ELOCON) 0.1 % ointment aaa bid x 1-2 weeks then prn 45 g 0    potassium chloride SA (K-DUR,KLOR-CON) 20 MEQ tablet Take 1 tablet (20 mEq total) by mouth once daily. 90 tablet 3    rosuvastatin (CRESTOR) 10 MG tablet Take 1 tablet (10 mg total) by mouth once daily. 90 tablet 3     No current facility-administered medications on file prior to visit.           Review of patient's allergies indicates:   Allergen Reactions    No known drug allergies          ROS:  General ROS: negative for  chills, fatigue or fever  Cardiovascular ROS: no chest pain or dyspnea on exertion  Musculoskeletal ROS: negative for joint pain or joint stiffness.  Negative for loss of strength.  Positive for foot pain.   Neuro ROS:  "Negative for syncope, numbness, or muscle weakness  Skin ROS: Negative for rash, itching or nail/hair changes.           OBJECTIVE:         Vitals:    05/20/24 1317   Weight: 81.6 kg (179 lb 14.3 oz)   Height: 5' 2" (1.575 m)        Left Lower extremity exam:  Vasc:   Palpable pedal pulses.   Feet appropriately warm to touch.   Cap refill time is within normal limits   Edema: n/a    Neurological:    Light touch, proprioception, and Sharp/dull sensation are all intact.   There is no Tinel's along the tarsal tunnel.      Derm:   No open lesions, macerations, or rashes  Bruising:  absent  Redness:  absent  Pedal hair:  present      MSK:    Palpable pain plantar medial tubercle of the calcaneus left,    tightness to the Achilles tendon with ROM left   There is no pain with the heel squeeze/compression  test.         "

## 2024-06-18 ENCOUNTER — LAB VISIT (OUTPATIENT)
Dept: LAB | Facility: HOSPITAL | Age: 69
End: 2024-06-18
Attending: FAMILY MEDICINE
Payer: COMMERCIAL

## 2024-06-18 DIAGNOSIS — R73.09 ELEVATED GLUCOSE: ICD-10-CM

## 2024-06-18 LAB
ESTIMATED AVG GLUCOSE: 137 MG/DL (ref 68–131)
HBA1C MFR BLD: 6.4 % (ref 4–5.6)

## 2024-06-18 PROCEDURE — 83036 HEMOGLOBIN GLYCOSYLATED A1C: CPT | Performed by: FAMILY MEDICINE

## 2024-06-18 PROCEDURE — 36415 COLL VENOUS BLD VENIPUNCTURE: CPT | Mod: PO | Performed by: FAMILY MEDICINE

## 2024-08-19 ENCOUNTER — PATIENT MESSAGE (OUTPATIENT)
Dept: ADMINISTRATIVE | Facility: HOSPITAL | Age: 69
End: 2024-08-19
Payer: COMMERCIAL

## 2024-08-22 ENCOUNTER — PATIENT OUTREACH (OUTPATIENT)
Dept: ADMINISTRATIVE | Facility: HOSPITAL | Age: 69
End: 2024-08-22
Payer: COMMERCIAL

## 2024-08-22 ENCOUNTER — TELEPHONE (OUTPATIENT)
Dept: FAMILY MEDICINE | Facility: CLINIC | Age: 69
End: 2024-08-22
Payer: COMMERCIAL

## 2024-08-22 DIAGNOSIS — Z78.0 POSTMENOPAUSAL: ICD-10-CM

## 2024-08-22 DIAGNOSIS — Z12.31 ENCOUNTER FOR SCREENING MAMMOGRAM FOR MALIGNANT NEOPLASM OF BREAST: Primary | ICD-10-CM

## 2024-08-22 DIAGNOSIS — E05.00 TOXIC DIFFUSE GOITER: Primary | ICD-10-CM

## 2024-08-22 NOTE — TELEPHONE ENCOUNTER
I have signed for the following orders AND/OR meds.  Please call the patient and ask the patient to schedule the testing AND/OR inform about any medications that were sent.      Orders Placed This Encounter   Procedures    US Thyroid     Standing Status:   Future     Standing Expiration Date:   8/22/2025     Order Specific Question:   May the Radiologist modify the order per protocol to meet the clinical needs of the patient?     Answer:   Yes     Order Specific Question:   Release to patient     Answer:   Immediate            [unfilled]

## 2024-08-22 NOTE — PROGRESS NOTES
Replying to Campaign Questionnaire for [facility]:  Mammogram & Bone Density Screening    Orders placed per [facility]. Mammogram & Dexa Scan scheduled 11/15/2024. Portal message sent to patient.

## 2024-08-22 NOTE — TELEPHONE ENCOUNTER
----- Message from Carlotta Forrester MA sent at 8/22/2024 11:34 AM CDT -----  Regarding: Thyroid US  Good morning,    This patient is requesting a Thyroid US be scheduled on 11/15/2024. Will you get the order put in and schedule it at [place] on 11/15/2024 while she is completing her Dexa and Mammogram.     Thank you,  Carlotta CASTILLO [facility]  Care Coordination Department  [facility]

## 2024-09-09 DIAGNOSIS — I10 ESSENTIAL HYPERTENSION: ICD-10-CM

## 2024-09-09 DIAGNOSIS — I47.10 PAROXYSMAL SUPRAVENTRICULAR TACHYCARDIA: ICD-10-CM

## 2024-09-09 RX ORDER — POTASSIUM CHLORIDE 20 MEQ/1
TABLET, EXTENDED RELEASE ORAL
Qty: 90 TABLET | Refills: 0 | Status: SHIPPED | OUTPATIENT
Start: 2024-09-09

## 2024-09-09 NOTE — TELEPHONE ENCOUNTER
Care Due:                  Date            Visit Type   Department     Provider  --------------------------------------------------------------------------------                                MYCHART                              ANNUAL                              CHECKUP/PHY  [facility]  Last Visit: 12-      S            MEDICINE       Latrell Banks                              EP -                              PRIMARY      [facility]  Next Visit: 01-      CARE ([facility])   MEDICINE       Latrell Banks                                                            Last  Test          Frequency    Reason                     Performed    Due Date  --------------------------------------------------------------------------------    CMP.........  12 months..  potassium, rosuvastatin..  11- 11-    Lipid Panel.  12 months..  rosuvastatin.............  11- 11-    [employer] Embedded Care Due Messages. Reference number: [id number].   9/09/2024 10:27:41 AM CDT

## 2024-09-10 RX ORDER — METOPROLOL SUCCINATE 100 MG/1
TABLET, EXTENDED RELEASE ORAL
Qty: 90 TABLET | Refills: 0 | Status: SHIPPED | OUTPATIENT
Start: 2024-09-10

## 2024-09-10 NOTE — TELEPHONE ENCOUNTER
Refill Routing Note   Medication(s) are not appropriate for processing by [facility] for the following reason(s):        Drug-disease interaction    [facility] action(s):  Defer  Approve   Requires labs : Yes      Medication Therapy Plan: FOVS; Drug-Disease: metoprolol succinate and Fatty liver    Pharmacist review requested: Yes     Appointments  past 12m or future 3m with PCP    Date Provider   Last Visit   12/6/2023 Latrell Banks MD   Next Visit   1/29/2025 Latrell Banks MD   ED visits in past 90 days: 0        Note composed:9:45 PM 09/09/2024

## 2024-09-10 NOTE — TELEPHONE ENCOUNTER
Provider Staff:  Action required for this patient     Please see care gap opportunities below in Care Due Message.    Thanks!  [facility]     Appointments      Date Provider   Last Visit   12/6/2023 Latrell Banks MD   Next Visit   1/29/2025 Latrell Banks MD      Refill Decision Note   Mallory David  is requesting a refill authorization.  Brief Assessment and Rationale for Refill:  Approve     Medication Therapy Plan:  FOVS;      Pharmacist review requested: Yes   Extended chart review required: Yes   Comments:     Note composed:1:53 AM 09/10/2024

## 2024-09-11 ENCOUNTER — PATIENT MESSAGE (OUTPATIENT)
Dept: FAMILY MEDICINE | Facility: CLINIC | Age: 69
End: 2024-09-11
Payer: COMMERCIAL

## 2024-10-19 DIAGNOSIS — E78.00 PURE HYPERCHOLESTEROLEMIA: ICD-10-CM

## 2024-10-19 NOTE — TELEPHONE ENCOUNTER
No care due was identified.  [facility] Embedded Care Due Messages. Reference number: [id number].   10/19/2024 11:05:24 AM CDT

## 2024-10-20 RX ORDER — ROSUVASTATIN CALCIUM 10 MG/1
10 TABLET, COATED ORAL DAILY
Qty: 90 TABLET | Refills: 0 | Status: SHIPPED | OUTPATIENT
Start: 2024-10-20

## 2024-10-20 NOTE — TELEPHONE ENCOUNTER
Refill Decision Note   Mallory Joann  is requesting a refill authorization.  Brief Assessment and Rationale for Refill:  Approve     Medication Therapy Plan:         Comments:     Note composed:2:07 PM 10/20/2024

## 2024-10-21 RX ORDER — IPRATROPIUM BROMIDE 42 UG/1
SPRAY, METERED NASAL
Qty: 15 ML | Refills: 6 | Status: SHIPPED | OUTPATIENT
Start: 2024-10-21

## 2024-11-05 ENCOUNTER — PATIENT MESSAGE (OUTPATIENT)
Dept: PREADMISSION TESTING | Facility: HOSPITAL | Age: 69
End: 2024-11-05
Payer: COMMERCIAL

## 2024-11-05 ENCOUNTER — PATIENT MESSAGE (OUTPATIENT)
Dept: FAMILY MEDICINE | Facility: CLINIC | Age: 69
End: 2024-11-05
Payer: COMMERCIAL

## 2024-11-05 DIAGNOSIS — Z12.11 COLON CANCER SCREENING: Primary | ICD-10-CM

## 2024-11-05 DIAGNOSIS — Z86.0100 HISTORY OF COLON POLYPS: ICD-10-CM

## 2024-11-05 NOTE — TELEPHONE ENCOUNTER
I have signed for the following orders AND/OR meds.  Please call the patient and ask the patient to schedule the testing AND/OR inform about any medications that were sent.      Orders Placed This Encounter   Procedures    Ambulatory referral/consult to Endo Procedure      Standing Status:   Future     Standing Expiration Date:   5/31/2025     Referral Priority:   Routine     Referral Type:   Consultation     Referred to Provider:   Latrell Banks MD     Number of Visits Requested:   1

## 2024-11-11 ENCOUNTER — PATIENT MESSAGE (OUTPATIENT)
Dept: GASTROENTEROLOGY | Facility: CLINIC | Age: 69
End: 2024-11-11
Payer: COMMERCIAL

## 2024-11-12 ENCOUNTER — OFFICE VISIT (OUTPATIENT)
Dept: CARDIOLOGY | Facility: CLINIC | Age: 69
End: 2024-11-12
Payer: COMMERCIAL

## 2024-11-12 ENCOUNTER — PATIENT MESSAGE (OUTPATIENT)
Dept: CARDIOLOGY | Facility: HOSPITAL | Age: 69
End: 2024-11-12
Payer: COMMERCIAL

## 2024-11-12 ENCOUNTER — HOSPITAL ENCOUNTER (OUTPATIENT)
Dept: CARDIOLOGY | Facility: HOSPITAL | Age: 69
Discharge: HOME | End: 2024-11-12
Attending: INTERNAL MEDICINE
Payer: COMMERCIAL

## 2024-11-12 VITALS
BODY MASS INDEX: 34.04 KG/M2 | DIASTOLIC BLOOD PRESSURE: 70 MMHG | SYSTOLIC BLOOD PRESSURE: 122 MMHG | HEART RATE: 60 BPM | HEIGHT: 62 IN | WEIGHT: 185 LBS

## 2024-11-12 DIAGNOSIS — E78.00 PURE HYPERCHOLESTEROLEMIA: ICD-10-CM

## 2024-11-12 DIAGNOSIS — R07.9 CHEST PAIN, MODERATE CORONARY ARTERY RISK: ICD-10-CM

## 2024-11-12 DIAGNOSIS — I10 PRIMARY HYPERTENSION: ICD-10-CM

## 2024-11-12 DIAGNOSIS — I47.10 SVT (SUPRAVENTRICULAR TACHYCARDIA): Primary | ICD-10-CM

## 2024-11-12 DIAGNOSIS — Q21.11 OSTIUM SECUNDUM TYPE ATRIAL SEPTAL DEFECT: ICD-10-CM

## 2024-11-12 PROCEDURE — 3288F FALL RISK ASSESSMENT DOCD: CPT | Mod: CPTII,S$GLB,, | Performed by: INTERNAL MEDICINE

## 2024-11-12 PROCEDURE — 1126F AMNT PAIN NOTED NONE PRSNT: CPT | Mod: CPTII,S$GLB,, | Performed by: INTERNAL MEDICINE

## 2024-11-12 PROCEDURE — 99999 PR PBB SHADOW E&M-EST. PATIENT-LVL IV: CPT | Mod: PBBFAC,,, | Performed by: INTERNAL MEDICINE

## 2024-11-12 PROCEDURE — 3074F SYST BP LT 130 MM HG: CPT | Mod: CPTII,S$GLB,, | Performed by: INTERNAL MEDICINE

## 2024-11-12 PROCEDURE — 93005 ELECTROCARDIOGRAM TRACING: CPT | Mod: PO

## 2024-11-12 PROCEDURE — 1159F MED LIST DOCD IN RCRD: CPT | Mod: CPTII,S$GLB,, | Performed by: INTERNAL MEDICINE

## 2024-11-12 PROCEDURE — 3044F HG A1C LEVEL LT 7.0%: CPT | Mod: CPTII,S$GLB,, | Performed by: INTERNAL MEDICINE

## 2024-11-12 PROCEDURE — 99215 OFFICE O/P EST HI 40 MIN: CPT | Mod: S$GLB,,, | Performed by: INTERNAL MEDICINE

## 2024-11-12 PROCEDURE — 93010 ELECTROCARDIOGRAM REPORT: CPT | Mod: ,,, | Performed by: INTERNAL MEDICINE

## 2024-11-12 PROCEDURE — 3078F DIAST BP <80 MM HG: CPT | Mod: CPTII,S$GLB,, | Performed by: INTERNAL MEDICINE

## 2024-11-12 PROCEDURE — 1160F RVW MEDS BY RX/DR IN RCRD: CPT | Mod: CPTII,S$GLB,, | Performed by: INTERNAL MEDICINE

## 2024-11-12 PROCEDURE — 1101F PT FALLS ASSESS-DOCD LE1/YR: CPT | Mod: CPTII,S$GLB,, | Performed by: INTERNAL MEDICINE

## 2024-11-12 PROCEDURE — 3008F BODY MASS INDEX DOCD: CPT | Mod: CPTII,S$GLB,, | Performed by: INTERNAL MEDICINE

## 2024-11-12 NOTE — PROGRESS NOTES
Subjective:   Patient ID:  Malloryleslee David is a 69 y.o. female who presents for follow-up of No chief complaint on file.  Patient denies palpitations.Pt with CP- dull, ache at rest or exertion lasting a minute. No SOB, dizziness    Hypertension  This is a chronic problem. The current episode started more than 1 year ago. The problem has been gradually improving since onset. The problem is controlled. Pertinent negatives include no chest pain, palpitations or shortness of breath. Past treatments include beta blockers and diuretics. The current treatment provides moderate improvement. There are no compliance problems.    Hyperlipidemia  This is a chronic problem. The current episode started more than 1 year ago. The problem is controlled. Pertinent negatives include no chest pain or shortness of breath. Current antihyperlipidemic treatment includes statins. The current treatment provides moderate improvement of lipids. There are no compliance problems.    Edema  This is a chronic problem. The current episode started more than 1 year ago. The problem occurs rarely. The problem has been gradually improving. Pertinent negatives include no chest pain. Nothing aggravates the symptoms. She has tried rest for the symptoms.   Dizziness:   Chronicity:  New  Onset:  1 to 4 weeks ago  Progression since onset:  Waxing and waning  Severity:  Mild  Duration:  Very brief  Dizziness characteristics:  Lightheaded/impending faintno palpitations and no chest pain.  Aggravated by:  Nothing  Treatments tried:  Rest  Improvements on treatment:  Moderate      Review of Systems   Constitutional: Negative. Negative for weight gain.   HENT: Negative.     Eyes: Negative.    Cardiovascular: Negative.  Negative for chest pain, leg swelling and palpitations.   Respiratory: Negative.  Negative for shortness of breath.    Endocrine: Negative.    Hematologic/Lymphatic: Negative.    Skin: Negative.    Musculoskeletal:  Negative for muscle weakness.    Gastrointestinal: Negative.    Genitourinary: Negative.    Neurological: Negative.  Negative for dizziness.   Psychiatric/Behavioral: Negative.     Allergic/Immunologic: Negative.    All other systems reviewed and are negative.    Family History   Problem Relation Name Age of Onset    Hypertension Mother Poonam Knoxomar Cummings     Arthritis Mother Poonam Knoxaux Emiliano     Hearing loss Mother Poonam Luis Cummings         cholesteatoma surgery    Cancer Father Tato Cummings Jr.         colon    Diabetes Father Tato Cummings Jr.     Breast cancer Sister Haley Ramírez     Cancer Sister Haley Ramírez         Breast    Aneurysm Brother          this was of his heart.    Birth defects Brother Tato Cummings, III         Heart murmer    Heart disease Brother Tato Cummings, III     Hypertension Brother Tato JONASTimmy Cummings, III     Cancer Brother Tato NADIA Cummings, III         Prostate Cancer    Diabetes Brother Moy RAMOSTimmy Cummings     Diabetes Brother Santosh MARY Cummings     Hypertension Brother Santosh MARY Cummings     Diabetes Sister Malia Brownne Diego     Heart disease Sister Malia Cummings Diego     Hypertension Sister Malia Perdueaukane TovarDiego     Stroke Sister Malia Cortez     Melanoma Neg Hx       Past Medical History:   Diagnosis Date    *Atrial flutter     s/p ablation    Abnormal Pap smear of cervix     ASD (atrial septal defect), ostium secundum     repaired    Benign hypertensive heart disease without heart failure     Endometriosis     Fatty liver     Hyperlipidemia     Hypertension     2009    Nodular goiter (nontoxic)     1999    Special screening for malignant neoplasms, colon 10/28/2014     Social History     Socioeconomic History    Marital status:      Spouse name: Alexi    Number of children: 2   Occupational History    Occupation:      Employer: [employer]   Tobacco Use    Smoking status: Never    Smokeless tobacco: Never    Substance and Sexual Activity    Alcohol use: Yes     Comment: occasional glass of red wine    Drug use: No    Sexual activity: Not Currently     Partners: Male     Birth control/protection: Post-menopausal     Social Drivers of Health     Financial Resource Strain: Low Risk  (1/4/2024)    Overall Financial Resource Strain (CARDIA)     Difficulty of Paying Living Expenses: Not hard at all   Food Insecurity: No Food Insecurity (1/4/2024)    Hunger Vital Sign     Worried About Running Out of Food in the Last Year: Never true     Ran Out of Food in the Last Year: Never true   Transportation Needs: No Transportation Needs (1/4/2024)    PRAPARE - Transportation     Lack of Transportation (Medical): No     Lack of Transportation (Non-Medical): No   Physical Activity: Sufficiently Active (1/4/2024)    Exercise Vital Sign     Days of Exercise per Week: 5 days     Minutes of Exercise per Session: 30 min   Stress: No Stress Concern Present (1/4/2024)    [employer] - Occupational Stress Questionnaire     Feeling of Stress : Not at all   Housing Stability: Low Risk  (1/4/2024)    Housing Stability Vital Sign     Unable to Pay for Housing in the Last Year: No     Number of Places Lived in the Last Year: 1     Unstable Housing in the Last Year: No     Current Outpatient Medications on File Prior to Visit   Medication Sig Dispense Refill    ANTIOX #11/OM3/DHA/EPA/LUT/NAMAN (50+ ADULT EYE HEALTH ORAL) Take by mouth.      aspirin 81 MG chewable tablet Take 81 mg by mouth once daily.      cetirizine 10 mg Cap Zyrtec   1 po q day      fluticasone propionate (FLONASE) 50 mcg/actuation nasal spray Use 2 sprays to each nostril daily 16 g 11    furosemide (LASIX) 40 MG tablet TAKE 1 TABLET (40 MG) BY MOUTH 3 TIMES DAILY 270 tablet 3    ipratropium (ATROVENT) 42 mcg (0.06 %) nasal spray USE 2 by Each Nostril route 2 (two) times daily as needed for Rhinitis 15 mL 6    levothyroxine (SYNTHROID) 125 MCG tablet  Take 1 tablet (125 mcg total) by mouth before breakfast. 90 tablet 3    MAGNESIUM ORAL Take by mouth once daily.      metoprolol succinate (TOPROL-XL) 100 MG 24 hr tablet TAKE 1 TABLET (100 MG) BY MOUTH 1 TIME A DAY 90 tablet 0    mometasone (ELOCON) 0.1 % ointment aaa bid x 1-2 weeks then prn 45 g 0    potassium chloride SA (K-DUR,KLOR-CON) 20 MEQ tablet TAKE 1 TABLET (20 MEQ) BY MOUTH 1 TIME A DAY 90 tablet 0    rosuvastatin (CRESTOR) 10 MG tablet Take 1 tablet (10 mg total) by mouth once daily. 90 tablet 0    leg brace (ANKLE BRACE) Misc 1 each by Misc.(Non-Drug; Combo Route) route Daily. 1 each 0     No current facility-administered medications on file prior to visit.     Review of patient's allergies indicates:   Allergen Reactions    No known drug allergies        Objective:     Physical Exam  Vitals and nursing note reviewed.   Constitutional:       Appearance: She is well-developed.   HENT:      Head: Normocephalic and atraumatic.   Eyes:      Conjunctiva/sclera: Conjunctivae normal.      Pupils: Pupils are equal, round, and reactive to light.   Cardiovascular:      Rate and Rhythm: Normal rate and regular rhythm.      Pulses: Intact distal pulses.      Heart sounds: Normal heart sounds.   Pulmonary:      Effort: Pulmonary effort is normal.      Breath sounds: Normal breath sounds.   Abdominal:      General: Bowel sounds are normal.      Palpations: Abdomen is soft.   Musculoskeletal:         General: Normal range of motion.      Cervical back: Normal range of motion and neck supple.   Skin:     General: Skin is warm and dry.   Neurological:      Mental Status: She is alert and oriented to person, place, and time.         Assessment:     1. SVT (supraventricular tachycardia)    2. Ostium secundum type atrial septal defect    3. Pure hypercholesterolemia    4. Primary hypertension    5. CP    Plan:     SVT (supraventricular tachycardia)    Ostium secundum type atrial septal defect    Pure  hypercholesterolemia    Primary hypertension    Continue metoprolol, lasix-htn/svt  Continue statin-hlp  Exercise     Stress test

## 2024-11-13 ENCOUNTER — HOSPITAL ENCOUNTER (OUTPATIENT)
Dept: PREADMISSION TESTING | Facility: HOSPITAL | Age: 69
Discharge: HOME | End: 2024-11-13
Attending: FAMILY MEDICINE
Payer: COMMERCIAL

## 2024-11-13 DIAGNOSIS — Z86.0100 HISTORY OF COLON POLYPS: ICD-10-CM

## 2024-11-13 DIAGNOSIS — Z12.11 COLON CANCER SCREENING: ICD-10-CM

## 2024-11-13 LAB
OHS QRS DURATION: 92 MS
OHS QTC CALCULATION: 438 MS

## 2024-11-15 ENCOUNTER — HOSPITAL ENCOUNTER (OUTPATIENT)
Dept: RADIOLOGY | Facility: HOSPITAL | Age: 69
Discharge: HOME | End: 2024-11-15
Attending: FAMILY MEDICINE
Payer: COMMERCIAL

## 2024-11-15 ENCOUNTER — IMMUNIZATION (OUTPATIENT)
Dept: FAMILY MEDICINE | Facility: CLINIC | Age: 69
End: 2024-11-15
Payer: COMMERCIAL

## 2024-11-15 DIAGNOSIS — Z12.31 ENCOUNTER FOR SCREENING MAMMOGRAM FOR MALIGNANT NEOPLASM OF BREAST: ICD-10-CM

## 2024-11-15 DIAGNOSIS — Z23 NEED FOR VACCINATION: Primary | ICD-10-CM

## 2024-11-15 DIAGNOSIS — E05.00 TOXIC DIFFUSE GOITER: ICD-10-CM

## 2024-11-15 DIAGNOSIS — Z78.0 POSTMENOPAUSAL: ICD-10-CM

## 2024-11-15 PROCEDURE — 76536 US EXAM OF HEAD AND NECK: CPT | Mod: 26,,, | Performed by: STUDENT IN AN ORGANIZED HEALTH CARE EDUCATION/TRAINING PROGRAM

## 2024-11-15 PROCEDURE — 77063 BREAST TOMOSYNTHESIS BI: CPT | Mod: 26,,, | Performed by: RADIOLOGY

## 2024-11-15 PROCEDURE — 77063 BREAST TOMOSYNTHESIS BI: CPT | Mod: TC,PO

## 2024-11-15 PROCEDURE — 77067 SCR MAMMO BI INCL CAD: CPT | Mod: 26,,, | Performed by: RADIOLOGY

## 2024-11-15 PROCEDURE — 77080 DXA BONE DENSITY AXIAL: CPT | Mod: TC,PO

## 2024-11-15 PROCEDURE — 76536 US EXAM OF HEAD AND NECK: CPT | Mod: TC,PO

## 2024-11-15 PROCEDURE — 77080 DXA BONE DENSITY AXIAL: CPT | Mod: 26,,, | Performed by: RADIOLOGY

## 2024-11-18 ENCOUNTER — TELEPHONE (OUTPATIENT)
Dept: CARDIOLOGY | Facility: CLINIC | Age: 69
End: 2024-11-18

## 2024-11-18 ENCOUNTER — HOSPITAL ENCOUNTER (OUTPATIENT)
Dept: CARDIOLOGY | Facility: HOSPITAL | Age: 69
Discharge: HOME | End: 2024-11-18
Attending: INTERNAL MEDICINE
Payer: COMMERCIAL

## 2024-11-18 DIAGNOSIS — R07.9 CHEST PAIN, MODERATE CORONARY ARTERY RISK: ICD-10-CM

## 2024-11-18 LAB
CV STRESS BASE HR: 79 BPM
DIASTOLIC BLOOD PRESSURE: 83 MMHG
OHS CV CPX 1 MINUTE RECOVERY HEART RATE: 131 BPM
OHS CV CPX 85 PERCENT MAX PREDICTED HEART RATE MALE: 129
OHS CV CPX ESTIMATED METS: 7
OHS CV CPX MAX PREDICTED HEART RATE: 152
OHS CV CPX PATIENT IS FEMALE: 1
OHS CV CPX PATIENT IS MALE: 0
OHS CV CPX PEAK DIASTOLIC BLOOD PRESSURE: 94 MMHG
OHS CV CPX PEAK HEAR RATE: 148 BPM
OHS CV CPX PEAK RATE PRESSURE PRODUCT: NORMAL
OHS CV CPX PEAK SYSTOLIC BLOOD PRESSURE: 204 MMHG
OHS CV CPX PERCENT MAX PREDICTED HEART RATE ACHIEVED: 101
OHS CV CPX RATE PRESSURE PRODUCT PRESENTING: NORMAL
STRESS ECHO POST EXERCISE DUR MIN: 5 MINUTES
STRESS ECHO POST EXERCISE DUR SEC: 50 SECONDS
STRESS ST DEPRESSION: 1 MM
SYSTOLIC BLOOD PRESSURE: 154 MMHG

## 2024-11-18 PROCEDURE — 93018 CV STRESS TEST I&R ONLY: CPT | Mod: ,,, | Performed by: INTERNAL MEDICINE

## 2024-11-18 PROCEDURE — 93016 CV STRESS TEST SUPVJ ONLY: CPT | Mod: ,,, | Performed by: INTERNAL MEDICINE

## 2024-11-18 PROCEDURE — 93017 CV STRESS TEST TRACING ONLY: CPT | Mod: PO

## 2024-11-18 NOTE — TELEPHONE ENCOUNTER
----- Message from Anthony Graham MD sent at 11/18/2024  3:57 PM CST -----  Lipids reviewed, continue current meds

## 2024-11-18 NOTE — TELEPHONE ENCOUNTER
Followed up with Mallory, patient has been notified of this information and all questions answered. Per patient's provider:  Lipids reviewed, continue current meds . Patient verbalized understanding.

## 2024-11-19 ENCOUNTER — PATIENT MESSAGE (OUTPATIENT)
Dept: CARDIOLOGY | Facility: CLINIC | Age: 69
End: 2024-11-19
Payer: COMMERCIAL

## 2024-11-19 NOTE — PROGRESS NOTES
This appears to be good news that there is not a significant change in your thyroid at this time.  No changes are recommended based on this study.

## 2024-11-20 ENCOUNTER — PATIENT MESSAGE (OUTPATIENT)
Dept: CARDIOLOGY | Facility: HOSPITAL | Age: 69
End: 2024-11-20
Payer: COMMERCIAL

## 2024-11-20 ENCOUNTER — HOSPITAL ENCOUNTER (OUTPATIENT)
Dept: PREADMISSION TESTING | Facility: HOSPITAL | Age: 69
Discharge: HOME | End: 2024-11-20
Attending: FAMILY MEDICINE
Payer: COMMERCIAL

## 2024-11-20 DIAGNOSIS — I10 HYPERTENSION: ICD-10-CM

## 2024-11-20 NOTE — TELEPHONE ENCOUNTER
Followed up with Mallory, scheduled appointment for patient. She verbalized understanding of date, time, and location of appointment.     (3) majora large, minora small

## 2024-11-21 ENCOUNTER — HOSPITAL ENCOUNTER (OUTPATIENT)
Dept: CARDIOLOGY | Facility: HOSPITAL | Age: 69
Discharge: HOME | End: 2024-11-21
Attending: INTERNAL MEDICINE
Payer: COMMERCIAL

## 2024-11-21 DIAGNOSIS — I48.0 PAROXYSMAL ATRIAL FIBRILLATION: ICD-10-CM

## 2024-11-22 ENCOUNTER — PATIENT MESSAGE (OUTPATIENT)
Dept: PODIATRY | Facility: CLINIC | Age: 69
End: 2024-11-22
Payer: COMMERCIAL

## 2024-11-22 DIAGNOSIS — E05.00 TOXIC DIFFUSE GOITER: ICD-10-CM

## 2024-11-22 RX ORDER — LEVOTHYROXINE SODIUM 125 UG/1
125 TABLET ORAL
Qty: 90 TABLET | Refills: 0 | Status: SHIPPED | OUTPATIENT
Start: 2024-11-22

## 2024-11-22 NOTE — TELEPHONE ENCOUNTER
Provider Staff:  Action required for this patient    Requires labs      Please see care gap opportunities below in Care Due Message.    Thanks!  [facility]     Appointments      Date Provider   Last Visit   12/6/2023 Latrell Banks MD   Next Visit   1/29/2025 Latrell Banks MD     Refill Decision Note   Mallory David  is requesting a refill authorization.  Brief Assessment and Rationale for Refill:  Approve     Medication Therapy Plan:        Comments:     Note composed:5:04 PM 11/22/2024

## 2024-11-22 NOTE — TELEPHONE ENCOUNTER
Care Due:                  Date            Visit Type   Department     Provider  --------------------------------------------------------------------------------                                MYCHART                              ANNUAL                              CHECKUP/PHY  [facility]  Last Visit: 12-      S            MEDICINE       Latrell Banks                              EP -                              PRIMARY      [facility]  Next Visit: 01-      CARE ([facility])   MEDICINE       Latrell Banks                                                            Last  Test          Frequency    Reason                     Performed    Due Date  --------------------------------------------------------------------------------    TSH.........  12 months..  levothyroxine............  02- 02-    [facility] Embedded Care Due Messages. Reference number: [id number].   11/22/2024 1:20:46 PM CST

## 2024-12-04 ENCOUNTER — PATIENT MESSAGE (OUTPATIENT)
Dept: CARDIOLOGY | Facility: CLINIC | Age: 69
End: 2024-12-04
Payer: COMMERCIAL

## 2024-12-09 ENCOUNTER — HOSPITAL ENCOUNTER (OUTPATIENT)
Dept: CARDIOLOGY | Facility: HOSPITAL | Age: 69
Discharge: HOME | End: 2024-12-09
Attending: INTERNAL MEDICINE
Payer: COMMERCIAL

## 2024-12-09 DIAGNOSIS — R94.39 ABNORMAL STRESS TEST: ICD-10-CM

## 2024-12-09 LAB
CV STRESS BASE HR: 67 BPM
DIASTOLIC BLOOD PRESSURE: 71 MMHG
NUC REST EJECTION FRACTION: 48
NUC STRESS EJECTION FRACTION: 48 %
OHS CV CPX 1 MINUTE RECOVERY HEART RATE: 123 BPM
OHS CV CPX 85 PERCENT MAX PREDICTED HEART RATE MALE: 129
OHS CV CPX ESTIMATED METS: 7
OHS CV CPX MAX PREDICTED HEART RATE: 152
OHS CV CPX PATIENT IS FEMALE: 1
OHS CV CPX PATIENT IS MALE: 0
OHS CV CPX PEAK DIASTOLIC BLOOD PRESSURE: 74 MMHG
OHS CV CPX PEAK HEAR RATE: 142 BPM
OHS CV CPX PEAK RATE PRESSURE PRODUCT: NORMAL
OHS CV CPX PEAK SYSTOLIC BLOOD PRESSURE: 189 MMHG
OHS CV CPX PERCENT MAX PREDICTED HEART RATE ACHIEVED: 97
OHS CV CPX RATE PRESSURE PRODUCT PRESENTING: 9112
OHS CV INITIAL DOSE: 10.4 MCG/KG/MIN
OHS CV PEAK DOSE: 30.2 MCG/KG/MIN
STRESS ECHO POST EXERCISE DUR MIN: 4 MINUTES
STRESS ECHO POST EXERCISE DUR SEC: 14 SECONDS
SYSTOLIC BLOOD PRESSURE: 136 MMHG

## 2024-12-09 PROCEDURE — 93017 CV STRESS TEST TRACING ONLY: CPT | Mod: PO

## 2024-12-09 PROCEDURE — 93018 CV STRESS TEST I&R ONLY: CPT | Mod: ,,, | Performed by: INTERNAL MEDICINE

## 2024-12-09 PROCEDURE — 93016 CV STRESS TEST SUPVJ ONLY: CPT | Mod: ,,, | Performed by: INTERNAL MEDICINE

## 2024-12-09 PROCEDURE — 78452 HT MUSCLE IMAGE SPECT MULT: CPT | Mod: 26,,, | Performed by: INTERNAL MEDICINE

## 2024-12-09 PROCEDURE — A9500 TC99M SESTAMIBI: HCPCS | Mod: PO | Performed by: INTERNAL MEDICINE

## 2024-12-09 RX ORDER — TETRAKIS(2-METHOXYISOBUTYLISOCYANIDE)COPPER(I) TETRAFLUOROBORATE 1 MG/ML
30.2 INJECTION, POWDER, LYOPHILIZED, FOR SOLUTION INTRAVENOUS
Status: COMPLETED | OUTPATIENT
Start: 2024-12-09 | End: 2024-12-09

## 2024-12-09 RX ORDER — TETRAKIS(2-METHOXYISOBUTYLISOCYANIDE)COPPER(I) TETRAFLUOROBORATE 1 MG/ML
10.4 INJECTION, POWDER, LYOPHILIZED, FOR SOLUTION INTRAVENOUS
Status: COMPLETED | OUTPATIENT
Start: 2024-12-09 | End: 2024-12-09

## 2024-12-09 RX ADMIN — KIT FOR THE PREPARATION OF TECHNETIUM TC99M SESTAMIBI 10.4 MILLICURIE: 1 INJECTION, POWDER, LYOPHILIZED, FOR SOLUTION PARENTERAL at 12:12

## 2024-12-09 RX ADMIN — KIT FOR THE PREPARATION OF TECHNETIUM TC99M SESTAMIBI 30.2 MILLICURIE: 1 INJECTION, POWDER, LYOPHILIZED, FOR SOLUTION PARENTERAL at 01:12

## 2024-12-10 ENCOUNTER — PATIENT MESSAGE (OUTPATIENT)
Dept: CARDIOLOGY | Facility: CLINIC | Age: 69
End: 2024-12-10
Payer: COMMERCIAL

## 2024-12-11 ENCOUNTER — HOSPITAL ENCOUNTER (OUTPATIENT)
Dept: PREADMISSION TESTING | Facility: HOSPITAL | Age: 69
Discharge: HOME | End: 2024-12-11
Attending: FAMILY MEDICINE
Payer: COMMERCIAL

## 2024-12-11 ENCOUNTER — TELEPHONE (OUTPATIENT)
Dept: CARDIOLOGY | Facility: CLINIC | Age: 69
End: 2024-12-11
Payer: COMMERCIAL

## 2024-12-11 NOTE — TELEPHONE ENCOUNTER
Dr. Banks is requesting cardiac clearance for a 5 yr routine Colonoscopy, pt was last seen in clinic on 11/12/24 and recently has some cardiac testing completed.  Any recommendations or contraindications prior to proceeding if applicable ? Please advise, thank you.       ----- Message from Summer sent at 12/11/2024  1:37 PM CST -----  Contact: Mallory  Patient called asking for advice about colonoscopy. They will not schedule her without a clearance with you. Pt is trying to be schedule next Tuesday. Please call patient at [phone]. Thanks!

## 2024-12-17 ENCOUNTER — OFFICE VISIT (OUTPATIENT)
Facility: CLINIC | Age: 69
End: 2024-12-17
Payer: COMMERCIAL

## 2024-12-17 DIAGNOSIS — L82.0 INFLAMED SEBORRHEIC KERATOSIS: Primary | ICD-10-CM

## 2024-12-17 DIAGNOSIS — Z12.83 SKIN CANCER SCREENING: ICD-10-CM

## 2024-12-17 DIAGNOSIS — L82.1 SEBORRHEIC KERATOSES: ICD-10-CM

## 2024-12-17 PROCEDURE — 3044F HG A1C LEVEL LT 7.0%: CPT | Mod: CPTII,S$GLB,, | Performed by: DERMATOLOGY

## 2024-12-17 PROCEDURE — 1101F PT FALLS ASSESS-DOCD LE1/YR: CPT | Mod: CPTII,S$GLB,, | Performed by: DERMATOLOGY

## 2024-12-17 PROCEDURE — 99999 PR PBB SHADOW E&M-EST. PATIENT-LVL III: CPT | Mod: PBBFAC,,, | Performed by: DERMATOLOGY

## 2024-12-17 PROCEDURE — 3288F FALL RISK ASSESSMENT DOCD: CPT | Mod: CPTII,S$GLB,, | Performed by: DERMATOLOGY

## 2024-12-17 PROCEDURE — 1159F MED LIST DOCD IN RCRD: CPT | Mod: CPTII,S$GLB,, | Performed by: DERMATOLOGY

## 2024-12-17 PROCEDURE — 99213 OFFICE O/P EST LOW 20 MIN: CPT | Mod: 25,S$GLB,, | Performed by: DERMATOLOGY

## 2024-12-17 PROCEDURE — 17110 DESTRUCTION B9 LES UP TO 14: CPT | Mod: S$GLB,,, | Performed by: DERMATOLOGY

## 2024-12-17 PROCEDURE — 1160F RVW MEDS BY RX/DR IN RCRD: CPT | Mod: CPTII,S$GLB,, | Performed by: DERMATOLOGY

## 2024-12-17 NOTE — PROGRESS NOTES
Subjective:      Patient ID:  Mallorychiara David is a 69 y.o. female who presents for   Chief Complaint   Patient presents with    Skin Check     TBSE     HPI  Established Patient  Here for 6 moth f/u [facility] 5/22024 with Dr. Sanchez  C/o skin tags appearing   C/o dry patches of skin     no Phx of NMSC.  Fhx of NMSC  + Mother and Father hx of BCC  no Fhx of melanoma  Review of Systems   Constitutional:  Negative for fever, chills and fatigue.   Skin:  Positive for daily sunscreen use, activity-related sunscreen use and wears hat.   Hematologic/Lymphatic: Does not bruise/bleed easily.       Objective:   Physical Exam   Constitutional: She appears well-developed and well-nourished. No distress.   HENT:   Mouth/Throat: Lips normal.    Eyes: Lids are normal.  No conjunctival no injection.   Cardiovascular:  There is no local extremity swelling and no dependent edema.             Neurological: She is alert and oriented to person, place, and time. She is not disoriented.   Psychiatric: She has a normal mood and affect.   Skin:   Areas Examined (abnormalities noted in diagram):   Scalp / Hair Palpated and Inspected  Head / Face Inspection Performed  Neck Inspection Performed  Chest / Axilla Inspection Performed  Abdomen Inspection Performed  Genitals / Buttocks / Groin Inspection Performed  Back Inspection Performed  RUE Inspected  LUE Inspection Performed  RLE Inspected  LLE Inspection Performed  Nails and Digits Inspection Performed                 Diagram Legend     Erythematous scaling macule/papule c/w actinic keratosis       Vascular papule c/w angioma      Pigmented verrucoid papule/plaque c/w seborrheic keratosis      Yellow umbilicated papule c/w sebaceous hyperplasia      Irregularly shaped tan macule c/w lentigo     1-2 mm smooth white papules consistent with Milia      Movable subcutaneous cyst with punctum c/w epidermal inclusion cyst      Subcutaneous movable cyst c/w pilar cyst      Firm pink to brown  papule c/w dermatofibroma      Pedunculated fleshy papule(s) c/w skin tag(s)      Evenly pigmented macule c/w junctional nevus     Mildly variegated pigmented, slightly irregular-bordered macule c/w mildly atypical nevus      Flesh colored to evenly pigmented papule c/w intradermal nevus       Pink pearly papule/plaque c/w basal cell carcinoma      Erythematous hyperkeratotic cursted plaque c/w SCC      Surgical scar with no sign of skin cancer recurrence      Open and closed comedones      Inflammatory papules and pustules      Verrucoid papule consistent consistent with wart     Erythematous eczematous patches and plaques     Dystrophic onycholytic nail with subungual debris c/w onychomycosis     Umbilicated papule    Erythematous-base heme-crusted tan verrucoid plaque consistent with inflamed seborrheic keratosis     Erythematous Silvery Scaling Plaque c/w Psoriasis     See annotation      Assessment / Plan:        Inflamed seborrheic keratosis  Cryosurgery procedure note:    Verbal consent from the patient is obtained. Liquid nitrogen cryosurgery is applied to 6 lesions to produce a freeze injury. The patient is aware that blisters may form and is instructed on wound care with gentle cleansing and use of vaseline ointment to keep moist until healed. The patient is supplied a handout on cryosurgery and is instructed to call if lesions do not completely resolve. Risk of dyspigmentation discussed.       Skin cancer screening  Upper body skin examination performed today including at least 6 points as noted in physical examination. No lesions suspicious for malignancy noted.      Seborrheic keratoses  These are benign inherited growths without a malignant potential. Reassurance given to patient. No treatment is necessary.                No follow-ups on file.

## 2024-12-18 ENCOUNTER — HOSPITAL ENCOUNTER (OUTPATIENT)
Dept: PREADMISSION TESTING | Facility: HOSPITAL | Age: 69
Discharge: HOME | End: 2024-12-18
Attending: FAMILY MEDICINE
Payer: COMMERCIAL

## 2024-12-18 DIAGNOSIS — Z86.0100 HISTORY OF COLON POLYPS: ICD-10-CM

## 2024-12-18 DIAGNOSIS — Z12.11 COLON CANCER SCREENING: Primary | ICD-10-CM

## 2024-12-18 RX ORDER — SODIUM, POTASSIUM,MAG SULFATES 17.5-3.13G
1 SOLUTION, RECONSTITUTED, ORAL ORAL DAILY
Qty: 1 KIT | Refills: 0 | Status: SHIPPED | OUTPATIENT
Start: 2024-12-18 | End: 2024-12-20

## 2024-12-20 ENCOUNTER — LAB VISIT (OUTPATIENT)
Dept: LAB | Facility: HOSPITAL | Age: 69
End: 2024-12-20
Attending: FAMILY MEDICINE
Payer: COMMERCIAL

## 2024-12-20 DIAGNOSIS — R73.09 ELEVATED GLUCOSE: ICD-10-CM

## 2024-12-20 LAB
ESTIMATED AVG GLUCOSE: 137 MG/DL (ref 68–131)
HBA1C MFR BLD: 6.4 % (ref 4–5.6)

## 2024-12-20 PROCEDURE — 36415 COLL VENOUS BLD VENIPUNCTURE: CPT | Mod: PO | Performed by: FAMILY MEDICINE

## 2024-12-20 PROCEDURE — 83036 HEMOGLOBIN GLYCOSYLATED A1C: CPT | Performed by: FAMILY MEDICINE

## 2025-01-14 ENCOUNTER — OFFICE VISIT (OUTPATIENT)
Dept: GASTROENTEROLOGY | Facility: CLINIC | Age: 70
End: 2025-01-14
Payer: COMMERCIAL

## 2025-01-14 DIAGNOSIS — K86.2 PANCREAS CYST: Primary | ICD-10-CM

## 2025-01-14 PROCEDURE — 98006 SYNCH AUDIO-VIDEO EST MOD 30: CPT | Mod: 95,,,

## 2025-01-14 NOTE — PROGRESS NOTES
"  [facility]  The patient location is: [place]  The chief complaint leading to consultation is: Pancreatic cyst  Visit type: Virtual visit with synchronous audio and video  Total time spent with patient: 14min    Each patient to whom he or she provides medical services by telemedicine is:  (1) informed of the relationship between the physician and patient and the respective role of any other health care provider with respect to management of the patient; and (2) notified that he or she may decline to receive medical services by telemedicine and may withdraw from such care at any time.       Reason for Visit:  There were no encounter diagnoses.    PCP:   Latrell Banks       History of Present Illness:  Patient is a 69 y.o. female with pmhx HTN, ASD, hyperlipidemia presents with a pancreatic cyst. The cyst was first noted on MRI 12/8/22 which demonstrated multiple small pancreatic cysts with the largest in the body measuring 6mm. No pancreatic duct dilation or concerning appearance. Most recent imaging was MRCP 12/20/23 which showed multiple subcentimeter T2 hyperintense lesions along the pancreas best seen on MRCP sequences. Some of these appear to communicate with the main pancreas duct and others do not have a clear communication. Two largest in pancreatic neck measuring 6mm. No pancreatic or biliary ductal dilation.  It's located in the the entire gland.  It measures up to 6 mm in size on the most recent imaging.  It is not symptomatic. Previous studies include MRI.   Since initial detection, the cyst has not increased in size. The pancreatic duct is not dilated. Previous FNA of the cyst has not been done    Endorses left upper abdominal "twisting/pulling" sensation especially when she leans forward but denies any other abdominal pain. Has fluctuating bowel pattern alternating constipation and diarrhea. She has regular follow up for these issues and getting colonoscopy soon. Denies NV, " weight loss, jaundice, fever, melena, or other symptoms. Family hx significant for father with CRC, sister with breast cancer before age 50, and brother with prostate cancer. No family history of pancreatic cancer. She does not smoke tobacco or drink frequently. Denies history of pancreatitis.        Prior Imaging:    MRI 12/8/22  FINDINGS:  The liver is of normal size contour and MR signal throughout.  No focal liver mass is seen is seen.  No intrahepatic biliary dilatation is noted.  The gallbladder is of normal size without MR evidence of stone.  The common bile duct follows a normal course down to the ampulla and is of normal caliber throughout without stone or mass.     The pancreatic duct is of normal caliber throughout.  Dilation or obstruction is not seen.  In the mid body of the pancreas there is a 6 mm round cyst.  This is not associated with a soft tissue or contrast enhancing mass.  2.5 cm distal to this in the proximal tail  is a 4 mm 2nd cyst.  On the MRCP images there appear to be 3 even smaller cysts in the tail of the pancreas measuring 2 mm, 4 mm and 2 mm.  A solid mass of the pancreas is not seen and contrast enhancement is not noted.  Findings are consistent with IPMN intraductal papillary mucinous neoplasm.  Follow-up is recommended.     Impression:     Probable IPMN with 2 and possibly 5 small cysts identified in the pancreas parenchyma.  The largest corresponds to the ultrasound findings and represents a 6 mm cyst.  A contrast enhancing or solid mass of the pancreas is not seen however.  The rest of the MRCP of the upper abdomen is negative        MRCP 12/20/23  Diffuse mild signal dropout in the liver on out of phase sequence.  Nonspecific gallbladder dilation.  No biliary dilation.  Pancreas duct is normal in caliber.  There are multiple subcentimeter T2 hyperintense lesions along the pancreas best seen on MRCP sequences.  Some of these appear to communicate with the main pancreas duct and  others do not have a clear communication.  The 2 largest are along the pancreas neck each measuring 6 mm.  The 3rd largest is along the pancreas tail measuring 3 mm.  Several others measuring 1-3 mm are seen along the body and tail.     Prominent perineural root sleeve cysts at several levels near the thoracolumbar junction, unchanged.         Personal/family factors:    There is not a family history of pancreatic cancer     The patient does not smoke.    ECOG status 0 - Asymptomatic      ROS:  Review of Systems   Constitutional:  Negative for chills, fever and weight loss.   Gastrointestinal:  Positive for abdominal pain. Negative for blood in stool, constipation, diarrhea, heartburn, melena, nausea and vomiting.   Skin:  Negative for itching and rash.        Medical History:  has a past medical history of *Atrial flutter, Abnormal Pap smear of cervix, ASD (atrial septal defect), ostium secundum, Benign hypertensive heart disease without heart failure, Endometriosis, Fatty liver, Hyperlipidemia, Hypertension, Nodular goiter (nontoxic), and Special screening for malignant neoplasms, colon (10/28/2014).    Surgical History:  has a past surgical history that includes Ovarian cyst surgery (1981); Open Heart Surgery (1970); Carpal tunnel release (2007); Heart ablation (2011); Ablation of dysrhythmic focus; ASD repair, secundum; Oophorectomy; Colonoscopy (N/A, 1/14/2020); and Eye surgery (11/2019).    Family History: family history includes Aneurysm in her brother; Arthritis in her mother; Birth defects in her brother; Breast cancer in her sister; Cancer in her brother, father, and sister; Diabetes in her brother, brother, father, and sister; Hearing loss in her mother; Heart disease in her brother and sister; Hypertension in her brother, brother, mother, and sister; Stroke in her sister..       Review of patient's allergies indicates:   Allergen Reactions    No known drug allergies        Current Outpatient Medications  on File Prior to Visit   Medication Sig Dispense Refill    ANTIOX #11/OM3/DHA/EPA/LUT/NAMAN (50+ ADULT EYE HEALTH ORAL) Take by mouth.      aspirin 81 MG chewable tablet Take 81 mg by mouth once daily.      cetirizine 10 mg Cap Zyrtec   1 po q day      fluticasone propionate (FLONASE) 50 mcg/actuation nasal spray Use 2 sprays to each nostril daily 16 g 11    furosemide (LASIX) 40 MG tablet TAKE 1 TABLET (40 MG) BY MOUTH 3 TIMES DAILY 270 tablet 3    ipratropium (ATROVENT) 42 mcg (0.06 %) nasal spray USE 2 by Each Nostril route 2 (two) times daily as needed for Rhinitis 15 mL 6    leg brace (ANKLE BRACE) Misc 1 each by Misc.(Non-Drug; Combo Route) route Daily. 1 each 0    levothyroxine (SYNTHROID) 125 MCG tablet Take 1 tablet (125 mcg total) by mouth before breakfast. 90 tablet 0    MAGNESIUM ORAL Take by mouth once daily.      metoprolol succinate (TOPROL-XL) 100 MG 24 hr tablet TAKE 1 TABLET (100 MG) BY MOUTH 1 TIME A DAY 90 tablet 0    mometasone (ELOCON) 0.1 % ointment aaa bid x 1-2 weeks then prn 45 g 0    potassium chloride SA (K-DUR,KLOR-CON) 20 MEQ tablet TAKE 1 TABLET (20 MEQ) BY MOUTH 1 TIME A DAY 90 tablet 0    rosuvastatin (CRESTOR) 10 MG tablet Take 1 tablet (10 mg total) by mouth once daily. 90 tablet 0     No current facility-administered medications on file prior to visit.         Objective Findings: (limited due to virtual nature of encounter)    Physical Exam:  Physical Exam  Constitutional:       General: She is not in acute distress.     Appearance: Normal appearance. She is not ill-appearing.   Eyes:      General: No scleral icterus.  Skin:     Coloration: Skin is not jaundiced.   Neurological:      Mental Status: She is alert and oriented to person, place, and time. Mental status is at baseline.             Labs:  Lab Results   Component Value Date    WBC 6.71 11/13/2023    HGB 14.7 11/13/2023    HCT 47.1 11/13/2023     11/13/2023    CRP 0.4 11/19/2014    CHOL 205 (H) 11/15/2024    TRIG 133  11/15/2024    HDL 68 11/15/2024    ALKPHOS 71 11/21/2024    ALT 24 11/21/2024    AST 23 11/21/2024     11/21/2024    K 4.0 11/21/2024     11/21/2024    CREATININE 0.8 11/21/2024    BUN 16 11/21/2024    CO2 31 (H) 11/21/2024    TSH 2.365 02/08/2024    INR 1.0 09/07/2011    HGBA1C 6.4 (H) 12/20/2024     69 year old female presenting for follow up of multiple subcentimeter pancreatic cysts measuring up to 6mm and stable since initial detection in 2022    Assessment:  No diagnosis found.     Pancreatic cyst in the the entire gland. Measuring up to 6 mm in size,  unchanged  Most likely etiology at this point is a IPMN    We discussed in detail the natural history of pancreatic cysts, the therapy involved, and the benefits of multimodality surveillance imaging including Ct, MRI, and EUS with FNA      Recommendations:  Multiple subcentimeter pancreatic cysts measuring up to 6mm and stable in size since 2022. Last imaging was MRI in December 2023. For subcentimeter pancreatic cysts, [place] pancreatic cyst guidelines recommend imaging surveillance in 6 months and then every 2 years if no change. At her visit with Dr. Anderson 1 year ago he suggested possible lengthening surveillance interval to every 2 years. At today's visit she expresses concern regarding these lesions and would like to repeat imaging now even though its been only one year d/t her family history of cancer which I believe is reasonable  Ordered MRI to assess the pancreatic cysts for any growth or changes since 2023. If no changes, we will discuss lengthening surveillance interval to every 2 years  Likely clinic visit in 1 year pending MRI results to discuss future surveillance   She should continue follow up with local PCP/GI for abdominal discomfort, constipation/diarrhea and proceed with upcoming colonoscopy as planned        Thank you so much for allowing me to participate in the care of ANKITA Stoll  Endoscopy Physician Assitant  [facility]

## 2025-01-28 ENCOUNTER — ANESTHESIA (OUTPATIENT)
Dept: ENDOSCOPY | Facility: HOSPITAL | Age: 70
End: 2025-01-28
Payer: COMMERCIAL

## 2025-01-28 ENCOUNTER — HOSPITAL ENCOUNTER (OUTPATIENT)
Dept: ENDOSCOPY | Facility: HOSPITAL | Age: 70
Discharge: HOME | End: 2025-01-28
Attending: FAMILY MEDICINE | Admitting: FAMILY MEDICINE
Payer: COMMERCIAL

## 2025-01-28 ENCOUNTER — ANESTHESIA EVENT (OUTPATIENT)
Dept: ENDOSCOPY | Facility: HOSPITAL | Age: 70
End: 2025-01-28
Payer: COMMERCIAL

## 2025-01-28 DIAGNOSIS — Z12.11 COLON CANCER SCREENING: Primary | ICD-10-CM

## 2025-01-28 DIAGNOSIS — K63.5 POLYP OF COLON, UNSPECIFIED PART OF COLON, UNSPECIFIED TYPE: ICD-10-CM

## 2025-01-28 DIAGNOSIS — K57.30 DIVERTICULOSIS OF COLON: ICD-10-CM

## 2025-01-28 DIAGNOSIS — Z86.0100 HISTORY OF COLON POLYPS: ICD-10-CM

## 2025-01-28 PROCEDURE — 37000008 HC ANESTHESIA 1ST 15 MINUTES

## 2025-01-28 PROCEDURE — 45380 COLONOSCOPY AND BIOPSY: CPT | Mod: 33 | Performed by: FAMILY MEDICINE

## 2025-01-28 PROCEDURE — 63600175 PHARM REV CODE 636 W HCPCS: Performed by: NURSE ANESTHETIST, CERTIFIED REGISTERED

## 2025-01-28 PROCEDURE — 37000009 HC ANESTHESIA EA ADD 15 MINS

## 2025-01-28 PROCEDURE — 27201012 HC FORCEPS, HOT/COLD, DISP

## 2025-01-28 PROCEDURE — 88305 TISSUE EXAM BY PATHOLOGIST: CPT | Performed by: PATHOLOGY

## 2025-01-28 PROCEDURE — 45380 COLONOSCOPY AND BIOPSY: CPT | Mod: 33,,, | Performed by: FAMILY MEDICINE

## 2025-01-28 RX ORDER — LIDOCAINE HYDROCHLORIDE 10 MG/ML
INJECTION, SOLUTION EPIDURAL; INFILTRATION; INTRACAUDAL; PERINEURAL
Status: DISCONTINUED | OUTPATIENT
Start: 2025-01-28 | End: 2025-01-28

## 2025-01-28 RX ORDER — PROPOFOL 10 MG/ML
VIAL (ML) INTRAVENOUS
Status: DISCONTINUED | OUTPATIENT
Start: 2025-01-28 | End: 2025-01-28

## 2025-01-28 RX ADMIN — LIDOCAINE HYDROCHLORIDE 50 MG: 10 SOLUTION INTRAVENOUS at 12:01

## 2025-01-28 RX ADMIN — PROPOFOL 80 MG: 10 INJECTION, EMULSION INTRAVENOUS at 12:01

## 2025-01-28 RX ADMIN — PROPOFOL 20 MG: 10 INJECTION, EMULSION INTRAVENOUS at 12:01

## 2025-01-28 NOTE — H&P
Short Stay Endoscopy History and Physical    PCP - Latrell Banks MD    Procedure - Colonoscopy  ASA - 2  Mallampati - per anesthesia  History of Anesthesia problems - no  Family history Anesthesia problems -  no     HPI:  This is a 69 y.o. female here for evaluation of :   Active Hospital Problems    Diagnosis  POA    *Colon cancer screening [Z12.11]  Not Applicable    History of colon polyps [Z86.0100]  Not Applicable     The patient has had colon polyps in the past.  The next colonoscopy is due in Oct 2019.          Resolved Hospital Problems   No resolved problems to display.         Health Maintenance         Date Due Completion Date    COVID-19 Vaccine (4 - 2024-25 season) 09/01/2024 12/14/2021    Colorectal Cancer Screening 01/14/2025 1/14/2020    Override on 10/6/2009: Done    Mammogram 11/15/2025 11/15/2024    Override on 1/15/2015: Done (date is approximate)    Override on 12/30/2013: Done (date is approximate)    Override on 1/30/2013: (N/S)    Hemoglobin A1c (Prediabetes) 12/20/2025 12/20/2024    DEXA Scan 11/15/2027 11/15/2024    TETANUS VACCINE 06/07/2028 6/7/2018    Lipid Panel 11/15/2029 11/15/2024            Screening - Yes  History of polyps - Yes     Diarrhea - no  Anemia - no  Blood in stools - no  Abdominal pain - no  Other - no    ROS:  CONSTITUTIONAL: Denies weight change,  fatigue, fevers, chills, night sweats.  CARDIOVASCULAR: Denies chest pain, shortness of breath, orthopnea and edema.  RESPIRATORY: Denies cough, hemoptysis, dyspnea, and wheezing.  GI: See HPI.    Medical History:   Past Medical History:   Diagnosis Date    *Atrial flutter     s/p ablation    Abnormal Pap smear of cervix     ASD (atrial septal defect), ostium secundum     repaired    Benign hypertensive heart disease without heart failure     Endometriosis     Fatty liver     Hyperlipidemia     Hypertension     2009    Nodular goiter (nontoxic)     1999    Special screening for malignant neoplasms, colon 10/28/2014        Surgical History:   Past Surgical History:   Procedure Laterality Date    ABLATION OF DYSRHYTHMIC FOCUS      ASD REPAIR, SECUNDUM      CARPAL TUNNEL RELEASE  2007    right hand    COLONOSCOPY N/A 1/14/2020    Procedure: COLONOSCOPY;  Surgeon: Latrell Banks MD;  Location: [facility];  Service: Endoscopy;  Laterality: N/A;    EYE SURGERY  11/2019 12/2019 right eye     Heart ablation  2011    OOPHORECTOMY      Open Heart Surgery  1970    OVARIAN CYST SURGERY  1981       Family History:   Family History   Problem Relation Name Age of Onset    Hypertension Mother Poonam Luis Cummings     Arthritis Mother Poonam Luis Cummings     Hearing loss Mother Poonam Luis Cummings         cholesteatoma surgery    Cancer Father Tato Cummings Jr.         colon    Diabetes Father Tato Cummings Jr.     Breast cancer Sister Haley Ramírez     Cancer Sister Haley Ramírez         Breast    Aneurysm Brother          this was of his heart.    Birth defects Brother Tatoreilly Cummings, III         Heart murmer    Heart disease Brother Tato Cummings, III     Hypertension Brother Tato NADIA Cummings, III     Cancer Brother Tato NADIA Cummings, III         Prostate Cancer    Diabetes Brother Moy FITZ Cummings     Diabetes Brother Santosh NOATimmy  Emiliano     Hypertension Brother Santosh HERNANDEZ  Emiliano     Diabetes Sister Malia Cortez     Heart disease Sister Malia Cortez     Hypertension Sister Malia Cortez     Stroke Sister Malia Cortez     Melanoma Neg Hx         Social History:   Social History     Tobacco Use    Smoking status: Never    Smokeless tobacco: Never   Substance Use Topics    Alcohol use: Yes     Comment: occasional glass of red wine    Drug use: No       Allergies:   Review of patient's allergies indicates:   Allergen Reactions    No known drug allergies        Medications:   Current Outpatient Medications on File Prior to Encounter   Medication Sig  Dispense Refill    ANTIOX #11/OM3/DHA/EPA/LUT/NAMAN (50+ ADULT EYE HEALTH ORAL) Take by mouth.      aspirin 81 MG chewable tablet Take 81 mg by mouth once daily.      cetirizine 10 mg Cap Zyrtec   1 po q day      fluticasone propionate (FLONASE) 50 mcg/actuation nasal spray Use 2 sprays to each nostril daily 16 g 11    furosemide (LASIX) 40 MG tablet TAKE 1 TABLET (40 MG) BY MOUTH 3 TIMES DAILY 270 tablet 3    ipratropium (ATROVENT) 42 mcg (0.06 %) nasal spray USE 2 by Each Nostril route 2 (two) times daily as needed for Rhinitis 15 mL 6    levothyroxine (SYNTHROID) 125 MCG tablet Take 1 tablet (125 mcg total) by mouth before breakfast. 90 tablet 0    MAGNESIUM ORAL Take by mouth once daily.      metoprolol succinate (TOPROL-XL) 100 MG 24 hr tablet TAKE 1 TABLET (100 MG) BY MOUTH 1 TIME A DAY 90 tablet 0    potassium chloride SA (K-DUR,KLOR-CON) 20 MEQ tablet TAKE 1 TABLET (20 MEQ) BY MOUTH 1 TIME A DAY 90 tablet 0    rosuvastatin (CRESTOR) 10 MG tablet Take 1 tablet (10 mg total) by mouth once daily. 90 tablet 0    leg brace (ANKLE BRACE) Misc 1 each by Misc.(Non-Drug; Combo Route) route Daily. 1 each 0    mometasone (ELOCON) 0.1 % ointment aaa bid x 1-2 weeks then prn 45 g 0     No current facility-administered medications on file prior to encounter.       Physical Exam:  Vital Signs:   Vitals:    01/28/25 1140   BP: 123/64   Pulse: 70   Resp: 20   Temp: 97.6 °F (36.4 °C)     General Appearance: Well appearing in no acute distress  ENT: OP clear  Chest: CTA B  CV: RRR, no m/r/g  Abd: s/nt/nd/nabs  Ext: no edema    Labs:Reviewed    IMP:  Active Hospital Problems    Diagnosis  POA    *Colon cancer screening [Z12.11]  Not Applicable    History of colon polyps [Z86.0100]  Not Applicable     The patient has had colon polyps in the past.  The next colonoscopy is due in Oct 2019.          Resolved Hospital Problems   No resolved problems to display.         Plan:   I have explained the risks and benefits of colonoscopy to  the patient including but not limited to bleeding, perforation, infection, and death. The patient wishes to proceed.

## 2025-01-28 NOTE — ANESTHESIA POSTPROCEDURE EVALUATION
Anesthesia Post Evaluation    Patient: Mallory David    Procedure(s) Performed: * No procedures listed *    Final Anesthesia Type: MAC      Patient location during evaluation: PACU  Patient participation: Yes- Able to Participate  Level of consciousness: awake and alert and awake  Post-procedure vital signs: reviewed and stable  Pain management: adequate  Airway patency: patent  TOM mitigation strategies: Multimodal analgesia  PONV status at discharge: No PONV  Anesthetic complications: no      Cardiovascular status: blood pressure returned to baseline  Respiratory status: spontaneous ventilation and room air  Hydration status: euvolemic  Follow-up not needed.              Vitals Value Taken Time   /66 01/28/25 1257   Temp 98 01/28/25 1257   Pulse 66 01/28/25 1257   Resp 12 01/28/25 1257   SpO2 98 01/28/25 1257         No case tracking events are documented in the log.      Pain/Luly Score: No data recorded

## 2025-01-28 NOTE — TRANSFER OF CARE
"Anesthesia Transfer of Care Note    Patient: Mallory David    Procedure(s) Performed: * No procedures listed *    Patient location: PACU    Anesthesia Type: MAC    Transport from OR: Transported from OR on room air with adequate spontaneous ventilation    Post pain: adequate analgesia    Post assessment: no apparent anesthetic complications    Post vital signs: stable    Level of consciousness: awake and alert    Nausea/Vomiting: no nausea/vomiting    Complications: none    Transfer of care protocol was followed      Last vitals: Visit Vitals  /64   Pulse 70   Temp 36.4 °C (97.6 °F)   Resp 20   Ht 5' 2" (1.575 m)   Wt 79.4 kg (175 lb)   LMP 09/20/2011   SpO2 98%   Breastfeeding No   BMI 32.01 kg/m²     "

## 2025-01-28 NOTE — ANESTHESIA PREPROCEDURE EVALUATION
01/28/2025  Mallory David is a 69 y.o., female.      Pre-op Assessment    I have reviewed the Patient Summary Reports.     I have reviewed the Nursing Notes. I have reviewed the NPO Status.   I have reviewed the Medications.     Review of Systems  Anesthesia Hx:  No problems with previous Anesthesia                Social:  Non-Smoker, Alcohol Use       Hematology/Oncology:  Hematology Normal   Oncology Normal                                   EENT/Dental:  EENT/Dental Normal           Cardiovascular:     Hypertension, well controlled    Dysrhythmias atrial fibrillation      hyperlipidemia                         Hypertension (ASD)         Pulmonary:  Pulmonary Normal                       Renal/:  Renal/ Normal                 Hepatic/GI:      Liver Disease,               Musculoskeletal:  Musculoskeletal Normal                Neurological:  Neurology Normal                                      Endocrine:        Obesity / BMI > 30  Dermatological:  Skin Normal    Psych:  Psychiatric Normal                    Physical Exam  General: Well nourished    Airway:  Mallampati: II   TM Distance: Normal  Tongue: Normal  Neck ROM: Normal ROM    Dental:  Intact    Chest/Lungs:  Clear to auscultation    Heart:  Rate: Normal        Anesthesia Plan  Type of Anesthesia, risks & benefits discussed:    Anesthesia Type: MAC  Intra-op Monitoring Plan: Standard ASA Monitors  Induction:  IV  Informed Consent: Informed consent signed with the Patient and all parties understand the risks and agree with anesthesia plan.  All questions answered. Patient consented to blood products? Yes  ASA Score: 3    Ready For Surgery From Anesthesia Perspective.     .

## 2025-01-29 ENCOUNTER — OFFICE VISIT (OUTPATIENT)
Dept: FAMILY MEDICINE | Facility: CLINIC | Age: 70
End: 2025-01-29
Payer: COMMERCIAL

## 2025-01-29 ENCOUNTER — LAB VISIT (OUTPATIENT)
Dept: LAB | Facility: HOSPITAL | Age: 70
End: 2025-01-29
Attending: FAMILY MEDICINE
Payer: COMMERCIAL

## 2025-01-29 VITALS
BODY MASS INDEX: 33.31 KG/M2 | HEIGHT: 62 IN | DIASTOLIC BLOOD PRESSURE: 72 MMHG | RESPIRATION RATE: 16 BRPM | WEIGHT: 181 LBS | HEART RATE: 65 BPM | SYSTOLIC BLOOD PRESSURE: 119 MMHG

## 2025-01-29 VITALS
HEART RATE: 68 BPM | OXYGEN SATURATION: 98 % | BODY MASS INDEX: 32.2 KG/M2 | DIASTOLIC BLOOD PRESSURE: 68 MMHG | RESPIRATION RATE: 16 BRPM | WEIGHT: 175 LBS | SYSTOLIC BLOOD PRESSURE: 128 MMHG | HEIGHT: 62 IN | TEMPERATURE: 98 F

## 2025-01-29 DIAGNOSIS — E05.00 TOXIC DIFFUSE GOITER: ICD-10-CM

## 2025-01-29 DIAGNOSIS — I48.3 TYPICAL ATRIAL FLUTTER: ICD-10-CM

## 2025-01-29 DIAGNOSIS — I10 HYPERTENSION, UNSPECIFIED TYPE: ICD-10-CM

## 2025-01-29 DIAGNOSIS — I10 ESSENTIAL HYPERTENSION: ICD-10-CM

## 2025-01-29 DIAGNOSIS — K86.2 PANCREATIC CYST: Primary | ICD-10-CM

## 2025-01-29 DIAGNOSIS — J30.9 ALLERGIC RHINITIS, UNSPECIFIED SEASONALITY, UNSPECIFIED TRIGGER: ICD-10-CM

## 2025-01-29 DIAGNOSIS — M85.80 OSTEOPENIA, UNSPECIFIED LOCATION: ICD-10-CM

## 2025-01-29 DIAGNOSIS — K86.2 PANCREATIC CYST: ICD-10-CM

## 2025-01-29 DIAGNOSIS — I47.10 PAROXYSMAL SUPRAVENTRICULAR TACHYCARDIA: ICD-10-CM

## 2025-01-29 DIAGNOSIS — R05.3 CHRONIC COUGH: ICD-10-CM

## 2025-01-29 DIAGNOSIS — E78.00 PURE HYPERCHOLESTEROLEMIA: ICD-10-CM

## 2025-01-29 DIAGNOSIS — K76.0 FATTY LIVER: ICD-10-CM

## 2025-01-29 DIAGNOSIS — Z00.00 ANNUAL PHYSICAL EXAM: Primary | ICD-10-CM

## 2025-01-29 PROBLEM — Z12.11 COLON CANCER SCREENING: Status: RESOLVED | Noted: 2025-01-28 | Resolved: 2025-01-29

## 2025-01-29 LAB
T4 FREE SERPL-MCNC: 0.82 NG/DL (ref 0.71–1.51)
TSH SERPL DL<=0.005 MIU/L-ACNC: 5.27 UIU/ML (ref 0.4–4)

## 2025-01-29 PROCEDURE — 3078F DIAST BP <80 MM HG: CPT | Mod: CPTII,S$GLB,, | Performed by: FAMILY MEDICINE

## 2025-01-29 PROCEDURE — 84443 ASSAY THYROID STIM HORMONE: CPT | Performed by: FAMILY MEDICINE

## 2025-01-29 PROCEDURE — 3288F FALL RISK ASSESSMENT DOCD: CPT | Mod: CPTII,S$GLB,, | Performed by: FAMILY MEDICINE

## 2025-01-29 PROCEDURE — 36415 COLL VENOUS BLD VENIPUNCTURE: CPT | Mod: PO | Performed by: FAMILY MEDICINE

## 2025-01-29 PROCEDURE — 1126F AMNT PAIN NOTED NONE PRSNT: CPT | Mod: CPTII,S$GLB,, | Performed by: FAMILY MEDICINE

## 2025-01-29 PROCEDURE — 99397 PER PM REEVAL EST PAT 65+ YR: CPT | Mod: S$GLB,,, | Performed by: FAMILY MEDICINE

## 2025-01-29 PROCEDURE — 3074F SYST BP LT 130 MM HG: CPT | Mod: CPTII,S$GLB,, | Performed by: FAMILY MEDICINE

## 2025-01-29 PROCEDURE — 3008F BODY MASS INDEX DOCD: CPT | Mod: CPTII,S$GLB,, | Performed by: FAMILY MEDICINE

## 2025-01-29 PROCEDURE — 1101F PT FALLS ASSESS-DOCD LE1/YR: CPT | Mod: CPTII,S$GLB,, | Performed by: FAMILY MEDICINE

## 2025-01-29 PROCEDURE — 1159F MED LIST DOCD IN RCRD: CPT | Mod: CPTII,S$GLB,, | Performed by: FAMILY MEDICINE

## 2025-01-29 PROCEDURE — 84439 ASSAY OF FREE THYROXINE: CPT | Performed by: FAMILY MEDICINE

## 2025-01-29 PROCEDURE — 99999 PR PBB SHADOW E&M-EST. PATIENT-LVL IV: CPT | Mod: PBBFAC,,, | Performed by: FAMILY MEDICINE

## 2025-01-29 RX ORDER — LEVOTHYROXINE SODIUM 125 UG/1
125 TABLET ORAL
Qty: 90 TABLET | Refills: 3 | Status: SHIPPED | OUTPATIENT
Start: 2025-01-29 | End: 2025-01-31

## 2025-01-29 RX ORDER — POTASSIUM CHLORIDE 20 MEQ/1
20 TABLET, EXTENDED RELEASE ORAL DAILY
Qty: 90 TABLET | Refills: 3 | Status: SHIPPED | OUTPATIENT
Start: 2025-01-29

## 2025-01-29 RX ORDER — CETIRIZINE HYDROCHLORIDE 10 MG/1
CAPSULE, LIQUID FILLED ORAL
COMMUNITY

## 2025-01-29 RX ORDER — MELATONIN 10 MG
1 TABLET, SUBLINGUAL SUBLINGUAL 2 TIMES DAILY WITH MEALS
COMMUNITY

## 2025-01-29 RX ORDER — ROSUVASTATIN CALCIUM 10 MG/1
10 TABLET, COATED ORAL DAILY
Qty: 90 TABLET | Refills: 3 | Status: SHIPPED | OUTPATIENT
Start: 2025-01-29

## 2025-01-29 RX ORDER — FLUTICASONE PROPIONATE 50 MCG
SPRAY, SUSPENSION (ML) NASAL
Qty: 16 G | Refills: 11 | Status: SHIPPED | OUTPATIENT
Start: 2025-01-29

## 2025-01-29 RX ORDER — METOPROLOL SUCCINATE 100 MG/1
100 TABLET, EXTENDED RELEASE ORAL DAILY
Qty: 90 TABLET | Refills: 3 | Status: SHIPPED | OUTPATIENT
Start: 2025-01-29

## 2025-01-29 RX ORDER — FUROSEMIDE 40 MG/1
TABLET ORAL
Qty: 270 TABLET | Refills: 3 | Status: SHIPPED | OUTPATIENT
Start: 2025-01-29

## 2025-01-29 NOTE — PROGRESS NOTES
Subjective:      Patient ID: Mallorykamilah David is a 69 y.o. female.    Chief Complaint: Annual Exam    History of Present Illness    CHIEF COMPLAINT:  Patient presents today for follow-up.    CARDIAC:  She underwent a stress test in November due to intermittent chest pain. On the morning of the stress test, she experienced her first arrhythmia episode in 14 years with heart rate approximately 170 BPM, which resolved prior to arrival at the cardiac clinic. She subsequently wore a heart monitor and underwent nuclear testing as part of cardiac workup.    GASTROINTESTINAL:  She has diverticulosis and reports a possible diverticulitis episode within the past year, characterized by severe abdominal pain that made walking difficult. She managed symptoms with BRAT diet without experiencing fever. She currently experiences intermittent abdominal pain on the right side that sometimes radiates upward, along with occasional bloating. When changing positions from leaning down to standing up, she feels bilateral pulling sensation of pain in her abdomen. She has a pancreatic cyst that was incidentally found on MRI and is scheduled for follow-up imaging. She reports family history of cancer.    MUSCULOSKELETAL:  She has perineural root sleeve cysts at several levels near the thoracolumbar junction seen on MRI. She experiences positional pain in the lower abdomen area that varies with different positions, suggesting possible nerve involvement. She was previously advised to perform back exercises by a physician for a similar issue years ago. She denies upper back pain. She has heel spur pain that became severe after a hiking trip to [place] last year, requiring podiatrist intervention with heel injection. She experiences ongoing tightness in the back of her left thigh associated with the left heel spur. She reports improvement with firm shoes, particularly hiking shoes, while flat shoes like Crocs exacerbate the condition.    ENT:  She  experiences severe sinus infections with episodes of epistaxis, particularly during holiday season. She reports improvement with combination therapy of Flonase, Atravent nasal spray, and nasal irrigation.    THYROID:  Last thyroid test was performed in February of previous year. Thyroid ultrasound showed similar results compared to previous exam.    CURRENT MEDICATIONS:  She takes Flonase (prescription), potassium, Lasix and metoprolol for blood pressure management, rosuvastatin for cholesterol, Synthroid for thyroid condition, OTC magnesium, and Atravent nasal spray for sinus symptoms.      ROS:  General: -fever, -chills, -fatigue, -weight gain, -weight loss  Eyes: -vision changes, -redness, -discharge  ENT: -ear pain, +nasal congestion, -sore throat  Cardiovascular: +chest pain, -palpitations, -lower extremity edema  Respiratory: -cough, -shortness of breath  Gastrointestinal: +abdominal pain, -nausea, -vomiting, -diarrhea, -constipation, -blood in stool, +bloating  Genitourinary: -dysuria, -hematuria, -frequency  Musculoskeletal: -joint pain, -muscle pain  Skin: -rash, -lesion  Neurological: -headache, -dizziness, -numbness, -tingling  Psychiatric: -anxiety, -depression, -sleep difficulty         Problem List Items Addressed This Visit       Fatty liver    Overview     The patient has a diagnosis of fatty liver.  Liver functions have been increased in the past.  The patient has had an U/S and this was the way that the fatty liver was determined.  The liver enzymes are tracked.  The patient has been instructed on using a low fat diet, exercise and weight loss to try to treat this.    Lab Results   Component Value Date    ALT 24 11/21/2024    AST 23 11/21/2024    ALKPHOS 71 11/21/2024    BILITOT 1.2 (H) 11/21/2024                Hyperlipidemia - Primary    Overview     The patient presents with hyperlipidemia.  The patient reports tolerating the medication well and is in excellent compliance.  There have been no  medication side effects.  The patient denies chest pain, neuropathy, and myalgias.  The patient has reduced fat intake and has been exercising.  Current treatment has included the medications listed in the med card.    Lab Results   Component Value Date    CHOL 205 (H) 11/15/2024    CHOL 188 11/13/2023    CHOL 215 (H) 11/10/2022       Lab Results   Component Value Date    HDL 68 11/15/2024    HDL 61 11/13/2023    HDL 76 (H) 11/10/2022       Lab Results   Component Value Date    LDLCALC 110.4 11/15/2024    LDLCALC 105.0 11/13/2023    LDLCALC 108.6 11/10/2022       Lab Results   Component Value Date    TRIG 133 11/15/2024    TRIG 110 11/13/2023    TRIG 152 (H) 11/10/2022       Lab Results   Component Value Date    CHOLHDL 33.2 11/15/2024    CHOLHDL 32.4 11/13/2023    CHOLHDL 35.3 11/10/2022     Lab Results   Component Value Date    ALT 24 11/21/2024    AST 23 11/21/2024    ALKPHOS 71 11/21/2024    BILITOT 1.2 (H) 11/21/2024   '           Relevant Medications    rosuvastatin (CRESTOR) 10 MG tablet    Hypertension    Overview     The patient presents with essential hypertension.  The patient is tolerating the medication well and is in excellent compliance.  The patient is experiencing no side effects.  Counseling was offered regarding low salt diets.  The patient has a reduced salt intake.  The patient denies chest pain, palpitations, shortness of breath, dyspnea on exertion, left or murmur neck pain, nausea, vomiting, diaphoresis, paroxysmal nocturnal dyspnea, and orthopnea.   Hypertension Medications               furosemide (LASIX) 40 MG tablet Take 1 tablet (40 mg total) by mouth 2 (two) times daily.    metoprolol succinate (TOPROL-XL) 50 MG 24 hr tablet Take 1 tablet (50 mg total) by mouth once daily.                   Relevant Medications    furosemide (LASIX) 40 MG tablet    metoprolol succinate (TOPROL-XL) 100 MG 24 hr tablet    potassium chloride SA (K-DUR,KLOR-CON) 20 MEQ tablet    Pancreatic cyst    Overview  "    She was found to have pancreatic cysts in 2022. She was seen by Dr. Alvarez and he recommended a repeat scan in 1 year.  She has an appt with Dr. Anderson to f/u on this at this time.  Below are the recommendations from Dr. Alvarez.    Mallory David, a 69-year-old female, presented for a virtual visit on January 14, 2025, regarding multiple pancreatic cysts first noted on MRI in December 2022, with the largest measuring 6mm [21]. A recent MRCP on December 20, 2023, showed stable subcentimeter T2 hyperintense lesions without pancreatic duct dilation or concerning features [21]. She reported a left upper abdominal "twisting/pulling" sensation but denied significant abdominal pain or other gastrointestinal symptoms [21]. The assessment indicated probable intraductal papillary mucinous neoplasm (IPMN), and imaging surveillance was recommended in six months, despite her request for earlier imaging due to family cancer concerns [21]. A follow-up MRI was ordered, and she was advised to monitor gastrointestinal symptoms with her local PCP [21]. An MRI is scheduled for 2/4/2025.      Istructions    MRI that you had showed that he had multiple small cysts with the largest being 6 mm in the pancreas.  The cysts are most likely something called a branch type IPMN.  These are types of cysts have a low risk of becoming cancer but need to be monitored.    I do not have any scans from prior available to review so the current MRI will be used as her baseline MRI to compare further scans with.  The risk of these becoming cancer in her case is low and the only recommendation at this time is to monitor these cysts by repeating an MRI/MRCP in 1 year.       On repeat MRCP is if the cyst is growing size, something grows inside of them, or the pancreas around the cysts have changes then endoscopic ultrasound would be indicated and scheduled.  This is a test by endoscopy when the camera the ultrasound is used to examine the pancreas " and obtain biopsies were samples as needed.  Your current cysts are too small to be evaluated for this test and the yield would be very well.    Because of your sister's history of breast cancer I would recommend asking her if she has been tested for the BRCA gene she was found to have a BRCA1 or BRCA2 genes then you might need to be checked as well.  Carriers of the genes to have a slight increased risk of pancreas cancer.  Monitoring for pancreas cancer will be done by the MRI as well he will be scan.                Toxic diffuse goiter    Overview     The patient presents with hypothyroidism.  The patient denies agitation, anxiety, blurred vision, chest pain, cold intolerance, constipation, dizziness, dry skin, fatigue, lightheadedness, paresthesias, skin coarsening, tachycardia, tremor, weight gain or weight loss.  The patient's current treatment has included Synthroid    Lab Results   Component Value Date    TSH 2.365 02/08/2024              Relevant Medications    levothyroxine (SYNTHROID) 125 MCG tablet    Other Relevant Orders    TSH    Typical atrial flutter     Other Visit Diagnoses       Allergic rhinitis, unspecified seasonality, unspecified trigger        Relevant Medications    fluticasone propionate (FLONASE) 50 mcg/actuation nasal spray    Chronic cough        Relevant Medications    fluticasone propionate (FLONASE) 50 mcg/actuation nasal spray    Essential hypertension        Relevant Medications    furosemide (LASIX) 40 MG tablet    metoprolol succinate (TOPROL-XL) 100 MG 24 hr tablet    potassium chloride SA (K-DUR,KLOR-CON) 20 MEQ tablet    Paroxysmal supraventricular tachycardia        Relevant Medications    metoprolol succinate (TOPROL-XL) 100 MG 24 hr tablet    Osteopenia, unspecified location                The patient's Health Maintenance was reviewed and the following appears to be due:   Health Maintenance Due   Topic Date Due    COVID-19 Vaccine (4 - 2024-25 season) 09/01/2024       Thyroid  Order: [id number]  Status: Final result       Visible to patient: Yes (seen)       Next appt: 02/04/2025 at 03:00 PM in Radiology (Magnetic Resonance (MRI))       Dx: Toxic diffuse goiter    2 Result Notes       1 Patient Communication  Details    Reading Physician Reading Date Result Priority   Bryn Brizuela MD  [phone] 11/15/2024      Narrative & Impression     EXAM:  US THYROID     CLINICAL HISTORY:  Thyrotoxicosis     TECHNIQUE:  Right scale, color and spectral Doppler ultrasound of the thyroid     COMPARISON:  11/13/2023     FINDINGS:  The right lobe of the thyroid measures 2.6 x 0.5 x 0.6 cm. The left lobe measures 0.9 x 0.3 x 0.4 cm.  The isthmus measures 1 mm in thickness.   Small heterogeneous thyroid consistent with chronic thyroiditis.  Right lobe nodule measuring 6 mm, previously 5 mm on the prior study.  Normal-appearing lymph nodes adjacent to the thyroid        Impression:   Similar exam comparison to the prior consistent with chronic thyroiditis.     Finalized on: 11/15/2024 5:00 PM By:  Bryn Brizuela MD  [facility]# [id number]      2024-11-15 17:02:[id number]    [facility]     Past Medical History:  Past Medical History:   Diagnosis Date    *Atrial flutter     s/p ablation    Abnormal Pap smear of cervix     ASD (atrial septal defect), ostium secundum     repaired    Benign hypertensive heart disease without heart failure     Endometriosis     Fatty liver     Hyperlipidemia     Hypertension     2009    Nodular goiter (nontoxic)     1999    Special screening for malignant neoplasms, colon 10/28/2014     Past Surgical History:   Procedure Laterality Date    ABLATION OF DYSRHYTHMIC FOCUS      ASD REPAIR, SECUNDUM      CARPAL TUNNEL RELEASE  2007    right hand    COLONOSCOPY N/A 1/14/2020    Procedure: COLONOSCOPY;  Surgeon: Latrell Banks MD;  Location: [facility];  Service: Endoscopy;  Laterality: N/A;    EYE SURGERY  11/2019 12/2019 right eye     Heart ablation  2011    OOPHORECTOMY      Open  Heart Surgery  1970    OVARIAN CYST SURGERY  1981     Review of patient's allergies indicates:   Allergen Reactions    No known drug allergies      Current Outpatient Medications on File Prior to Visit   Medication Sig Dispense Refill    ANTIOX #11/OM3/DHA/EPA/LUT/NAMAN (50+ ADULT EYE HEALTH ORAL) Take by mouth.      aspirin 81 MG chewable tablet Take 81 mg by mouth once daily.      calcium-vitamin D3 (CALCIUM 500 + D) 500 mg-5 mcg (200 unit) per tablet Take 1 tablet by mouth 2 (two) times daily with meals.      cetirizine (ZYRTEC) 10 mg Cap 1 po q day      ipratropium (ATROVENT) 42 mcg (0.06 %) nasal spray USE 2 by Each Nostril route 2 (two) times daily as needed for Rhinitis 15 mL 6    MAGNESIUM ORAL Take by mouth once daily.      mometasone (ELOCON) 0.1 % ointment aaa bid x 1-2 weeks then prn 45 g 0    [DISCONTINUED] cetirizine 10 mg Cap Zyrtec   1 po q day      [DISCONTINUED] fluticasone propionate (FLONASE) 50 mcg/actuation nasal spray Use 2 sprays to each nostril daily 16 g 11    [DISCONTINUED] furosemide (LASIX) 40 MG tablet TAKE 1 TABLET (40 MG) BY MOUTH 3 TIMES DAILY 270 tablet 3    [DISCONTINUED] levothyroxine (SYNTHROID) 125 MCG tablet Take 1 tablet (125 mcg total) by mouth before breakfast. 90 tablet 0    [DISCONTINUED] metoprolol succinate (TOPROL-XL) 100 MG 24 hr tablet TAKE 1 TABLET (100 MG) BY MOUTH 1 TIME A DAY 90 tablet 0    [DISCONTINUED] potassium chloride SA (K-DUR,KLOR-CON) 20 MEQ tablet TAKE 1 TABLET (20 MEQ) BY MOUTH 1 TIME A DAY 90 tablet 0    [DISCONTINUED] rosuvastatin (CRESTOR) 10 MG tablet Take 1 tablet (10 mg total) by mouth once daily. 90 tablet 0    [DISCONTINUED] vits A,C,E/lutein/zeax/zn/lisa (EYE HEALTH FORMULA ORAL) (Omega 3 Fish Oil DHEA) 1 po q day       Current Facility-Administered Medications on File Prior to Visit   Medication Dose Route Frequency Provider Last Rate Last Admin    [DISCONTINUED] LIDOcaine (PF) 10 mg/ml (1%) injection   Other PRN Rm Oscar, CRNA   50 mg  at 01/28/25 1243    [DISCONTINUED] propofol (DIPRIVAN) 10 mg/mL infusion   Intravenous PRN Rm Oscar, CRNA   20 mg at 01/28/25 1250     Social History     Socioeconomic History    Marital status:      Spouse name: Alexi    Number of children: 2   Occupational History    Occupation:      Employer: [employer]   Tobacco Use    Smoking status: Never    Smokeless tobacco: Never   Substance and Sexual Activity    Alcohol use: Yes     Comment: occasional glass of red wine    Drug use: No    Sexual activity: Not Currently     Partners: Male     Birth control/protection: Post-menopausal     Social Drivers of Health     Financial Resource Strain: Low Risk  (1/10/2025)    Overall Financial Resource Strain (CARDIA)     Difficulty of Paying Living Expenses: Not hard at all   Food Insecurity: No Food Insecurity (1/10/2025)    Hunger Vital Sign     Worried About Running Out of Food in the Last Year: Never true     Ran Out of Food in the Last Year: Never true   Transportation Needs: No Transportation Needs (1/4/2024)    PRAPARE - Transportation     Lack of Transportation (Medical): No     Lack of Transportation (Non-Medical): No   Physical Activity: Insufficiently Active (1/10/2025)    Exercise Vital Sign     Days of Exercise per Week: 3 days     Minutes of Exercise per Session: 20 min   Stress: No Stress Concern Present (1/10/2025)    [employer] [place] [employer] - Occupational Stress Questionnaire     Feeling of Stress : Not at all   Housing Stability: Low Risk  (1/4/2024)    Housing Stability Vital Sign     Unable to Pay for Housing in the Last Year: No     Number of Places Lived in the Last Year: 1     Unstable Housing in the Last Year: No     Family History   Problem Relation Name Age of Onset    Hypertension Mother Poonam Knoxomar Cummings     Arthritis Mother Poonam Knoxomar Cummings     Hearing loss Mother Poonam Smith Emiliano         cholesteatoma surgery    Cancer Father Tato  "Phu Cummings Jr.         colon    Diabetes Father Tato Cummings Jr.     Breast cancer Sister Haley Ramírez     Cancer Sister Haley Ramírez         Breast    Aneurysm Brother          this was of his heart.    Birth defects Brother Tato Cummings, III         Heart murmer    Heart disease Brother Tato Cummings, III     Hypertension Brother Tato Cummings, III     Cancer Brother Tato Cummings, III         Prostate Cancer    Diabetes Brother Moy Cummings     Diabetes Brother Santosh Cummings     Hypertension Brother Santosh Cummings     Diabetes Sister Malia Cummings Diego     Heart disease Sister Malia Tovardrick     Hypertension Sister Malia Cortez     Stroke Sister Malia Coretz     Melanoma Neg Hx         Review of Systems   Constitutional:  Negative for fatigue, fever and unexpected weight change.   HENT:  Negative for congestion, ear pain, postnasal drip and sore throat.    Eyes:  Negative for visual disturbance.   Respiratory:  Negative for cough, chest tightness, shortness of breath and wheezing.    Cardiovascular:  Negative for chest pain, palpitations and leg swelling.   Gastrointestinal:  Positive for abdominal pain (this is positional in nature and radiates from the back.). Negative for blood in stool, constipation, diarrhea, nausea and vomiting.   Genitourinary:  Negative for dysuria and hematuria.   Neurological:  Negative for weakness and numbness.       Objective:   /72 (BP Location: Left arm, Patient Position: Sitting)   Pulse 65   Resp 16   Ht 5' 2" (1.575 m)   Wt 82.1 kg (181 lb)   LMP 09/20/2011   BMI 33.11 kg/m²   Physical Exam    General: No acute distress. Well-developed. Well-nourished.  Eyes: EOMI. Sclerae anicteric.  HENT: Normocephalic. Atraumatic. Nares patent. Moist oral mucosa.  Ears: Bilateral TMs clear. Bilateral EACs clear.  Cardiovascular: Regular rate. Regular rhythm. No murmurs. No rubs. No " gallops. Normal S1, S2.  Respiratory: Normal respiratory effort. Clear to auscultation bilaterally. No rales. No rhonchi. No wheezing.  Abdomen: Soft. Non-tender. Non-distended. Normoactive bowel sounds.  Musculoskeletal: No  obvious deformity.  Extremities: No lower extremity edema.  Neurological: Alert & oriented x3. No slurred speech. Normal gait.  Psychiatric: Normal mood. Normal affect. Good insight. Good judgment.  Skin: Warm. Dry. No rash.       Physical Exam  Constitutional:       Appearance: Normal appearance. She is well-developed.   HENT:      Head: Normocephalic and atraumatic.      Right Ear: Tympanic membrane, ear canal and external ear normal.      Left Ear: Tympanic membrane, ear canal and external ear normal.      Nose: Nose normal.      Mouth/Throat:      Mouth: No oral lesions.      Pharynx: Uvula midline. No oropharyngeal exudate or posterior oropharyngeal erythema.   Eyes:      General: Lids are normal. No scleral icterus.        Right eye: No discharge.         Left eye: No discharge.      Extraocular Movements:      Right eye: No nystagmus.      Left eye: No nystagmus.      Conjunctiva/sclera:      Right eye: Right conjunctiva is not injected. No hemorrhage.     Left eye: Left conjunctiva is not injected. No hemorrhage.     Pupils: Pupils are equal, round, and reactive to light.   Neck:      Thyroid: No thyroid mass or thyromegaly.      Vascular: No carotid bruit or JVD.      Trachea: No tracheal tenderness or tracheal deviation.   Cardiovascular:      Rate and Rhythm: Normal rate and regular rhythm.      Pulses:           Carotid pulses are 2+ on the right side and 2+ on the left side.       Radial pulses are 2+ on the right side and 2+ on the left side.        Posterior tibial pulses are 2+ on the right side and 2+ on the left side.      Heart sounds: S1 normal and S2 normal. No murmur heard.  Pulmonary:      Effort: Pulmonary effort is normal. No respiratory distress.      Breath sounds:  Normal breath sounds. No wheezing, rhonchi or rales.   Abdominal:      General: Bowel sounds are normal. There is no distension or abdominal bruit.      Palpations: Abdomen is soft. There is no mass or pulsatile mass.      Tenderness: There is no abdominal tenderness. There is no rebound.   Musculoskeletal:      Cervical back: Full passive range of motion without pain, normal range of motion and neck supple.      Right knee: No swelling. No tenderness.      Left knee: No swelling. No tenderness.   Lymphadenopathy:      Head:      Right side of head: No submental or submandibular adenopathy.      Left side of head: No submental or submandibular adenopathy.      Cervical: No cervical adenopathy.   Skin:     General: Skin is warm and dry.      Findings: No rash.      Nails: There is no clubbing.   Neurological:      Mental Status: She is alert.      Cranial Nerves: No cranial nerve deficit.      Sensory: No sensory deficit.   Psychiatric:         Speech: Speech normal.         Behavior: Behavior normal. Behavior is cooperative.         Thought Content: Thought content normal.       Assessment:     1. Annual physical exam    2. Typical atrial flutter    3. Pure hypercholesterolemia    4. Toxic diffuse goiter    5. Allergic rhinitis, unspecified seasonality, unspecified trigger    6. Chronic cough    7. Essential hypertension    8. Paroxysmal supraventricular tachycardia    9. Pancreatic cyst    10. Hypertension, unspecified type    11. Fatty liver    12. Osteopenia, unspecified location    Visit today included increased complexity associated with the care of the episodic problem htn addressed and managing the longitudinal care of the patient due to the serious and/or complex managed problem(s) htn.  Plan:   Assessment & Plan    IMPRESSION:  - Reviewed current medications and health maintenance status  - Assessed thyroid function, noting last TSH was in February of previous year  - Evaluated pancreatic cyst follow-up  with Dr. Lim, confirming scheduled MRI  - Considered family history of cancer in relation to pancreatic cyst monitoring  - Reviewed recent cardiac workup, including stress test and heart monitor results  - Assessed reports of abdominal pain, considering diverticulitis as a possible past occurrence  - Evaluated perineural root sleeve cysts at thoracolumbar junction as potential source of abdominal discomfort    HYPERTENSION:  - Continue Lasix for blood pressure management.  - Continue metoprolol 100 mg daily for blood pressure control.  - Continue potassium 20 mEq daily.  - Monitor the patient's blood pressure, which is currently well-controlled with current medications.    THYROID DISORDER:  - Continue Synthroid 125 mcg daily for thyroid management.  - Order TSH test (non-fasting).  - Follow up on TSH test results.  - Note that the last thyroid test was performed in February of last year.  - Review thyroid ultrasound performed in November, which shows similar findings compared to previous exam.    HYPERLIPIDEMIA:  - Continue rosuvastatin 10 mg daily for cholesterol management.  - Monitor cholesterol level, which was 110 in November.  - Note that the patient does not have coronary disease, so cholesterol goal is different.  - Continue atorvastatin 10 mg daily.    OSTEOPENIA:  - Review DEXA scan results, which showed osteopenia.  - Recommend calcium supplementation and weight-bearing exercises for osteopenia management.    PANCREATIC CYST:  - Schedule follow-up after MRI results of pancreatic cyst on February 4th.  - Note that the patient has a pancreatic cyst being followed by Dr. Anderson.  - Emphasize the importance of following up on the pancreatic cyst due to family history of cancer.  - Confirm MRI W Contrast scheduled for February 4th to follow up on pancreatic cyst.    DIVERTICULITIS:  - Note the patient's report of past episode of abdominal pain, possibly related to diverticulitis.  - Acknowledge the  possibility of past diverticulitis episode.    BACK PAIN:  - Explain the possible connection between perineural root sleeve cysts and abdominal pain.  - Note the patient's report of positional pain in lower abdomen and back.  - Review MRI results showing perineural root sleeve cysts at several levels near the thoracolumbar junction.  - Recommend stretching exercises for back pain management.  - Discussed the importance of stretching exercises for managing back-related abdominal discomfort.  - Patient to perform stretching exercises to manage back-related abdominal discomfort.    FAMILY HISTORY OF CANCER:  - Document the patient's family history of cancer, including brother with prostate cancer, father, and sister.    ARRHYTHMIA:  - Document the patient's past history of arrhythmia.    BREAST CANCER SCREENING:  - Review recent mammogram results, which were normal.    FOLLOW UP:  - Review that the patient had a tele-visit with Dr. Anderson a year ago in January and a follow-up with SIA Winston in January of this year.       I have changed Mallory David's metoprolol succinate and potassium chloride SA. I am also having her maintain her aspirin, ANTIOX #11/OM3/DHA/EPA/LUT/NAMAN (50+ ADULT EYE HEALTH ORAL), MAGNESIUM ORAL, mometasone, ipratropium, ZyrTEC, calcium-vitamin D3, fluticasone propionate, furosemide, levothyroxine, and rosuvastatin.  No problem-specific Assessment & Plan notes found for this encounter.      No follow-ups on file.    Mallory was seen today for annual exam.    Diagnoses and all orders for this visit:    Pure hypercholesterolemia  -     rosuvastatin (CRESTOR) 10 MG tablet; Take 1 tablet (10 mg total) by mouth once daily.    Typical atrial flutter    Toxic diffuse goiter  -     TSH; Future  -     levothyroxine (SYNTHROID) 125 MCG tablet; Take 1 tablet (125 mcg total) by mouth before breakfast.    Allergic rhinitis, unspecified seasonality, unspecified trigger  -     fluticasone propionate (FLONASE) 50  mcg/actuation nasal spray; Use 2 sprays to each nostril daily    Chronic cough  -     fluticasone propionate (FLONASE) 50 mcg/actuation nasal spray; Use 2 sprays to each nostril daily    Essential hypertension  -     furosemide (LASIX) 40 MG tablet; TAKE 1 TABLET (40 MG) BY MOUTH 3 TIMES DAILY  -     metoprolol succinate (TOPROL-XL) 100 MG 24 hr tablet; Take 1 tablet (100 mg total) by mouth once daily.  -     potassium chloride SA (K-DUR,KLOR-CON) 20 MEQ tablet; Take 1 tablet (20 mEq total) by mouth once daily.    Paroxysmal supraventricular tachycardia  -     metoprolol succinate (TOPROL-XL) 100 MG 24 hr tablet; Take 1 tablet (100 mg total) by mouth once daily.    Pancreatic cyst  Continue follow up on this issue with the current specialist.  Hypertension, unspecified type  Continue current medications that are being given for this problem.  Fatty liver  Track labs over time  Osteopenia, unspecified location  Weight bearing exercises and calcium as above.    Medications Ordered This Encounter   Medications    fluticasone propionate (FLONASE) 50 mcg/actuation nasal spray     Sig: Use 2 sprays to each nostril daily     Dispense:  16 g     Refill:  11    furosemide (LASIX) 40 MG tablet     Sig: TAKE 1 TABLET (40 MG) BY MOUTH 3 TIMES DAILY     Dispense:  270 tablet     Refill:  3     This prescription was filled on 4/24/2023. Any refills authorized will be placed on file.    levothyroxine (SYNTHROID) 125 MCG tablet     Sig: Take 1 tablet (125 mcg total) by mouth before breakfast.     Dispense:  90 tablet     Refill:  3     This prescription was filled on 11/22/2024. Any refills authorized will be placed on file.    metoprolol succinate (TOPROL-XL) 100 MG 24 hr tablet     Sig: Take 1 tablet (100 mg total) by mouth once daily.     Dispense:  90 tablet     Refill:  3     This prescription was filled on 9/9/2024. Any refills authorized will be placed on file. - .    potassium chloride SA (K-DUR,KLOR-CON) 20 MEQ tablet      Sig: Take 1 tablet (20 mEq total) by mouth once daily.     Dispense:  90 tablet     Refill:  3     This prescription was filled on 9/9/2024. Any refills authorized will be placed on file.    rosuvastatin (CRESTOR) 10 MG tablet     Sig: Take 1 tablet (10 mg total) by mouth once daily.     Dispense:  90 tablet     Refill:  3     The patient was instructed to stop the following meds:  Medications Discontinued During This Encounter   Medication Reason    cetirizine 10 mg Cap Patient no longer taking    vits A,C,E/lutein/zeax/zn/lisa (EYE HEALTH FORMULA ORAL) Patient no longer taking    furosemide (LASIX) 40 MG tablet Reorder    fluticasone propionate (FLONASE) 50 mcg/actuation nasal spray Reorder    potassium chloride SA (K-DUR,KLOR-CON) 20 MEQ tablet Reorder    metoprolol succinate (TOPROL-XL) 100 MG 24 hr tablet Reorder    rosuvastatin (CRESTOR) 10 MG tablet Reorder    levothyroxine (SYNTHROID) 125 MCG tablet Reorder     Orders Placed This Encounter   Procedures    TSH     Standing Status:   Future     Number of Occurrences:   1     Standing Expiration Date:   1/30/2026     Order Specific Question:   Send normal result to authorizing provider's In Basket if patient is active on MyChart:     Answer:   Yes       Medication List with Changes/Refills   Current Medications    ANTIOX #11/OM3/DHA/EPA/LUT/NAMAN (50+ ADULT EYE HEALTH ORAL)    Take by mouth.    ASPIRIN 81 MG CHEWABLE TABLET    Take 81 mg by mouth once daily.    CALCIUM-VITAMIN D3 (CALCIUM 500 + D) 500 MG-5 MCG (200 UNIT) PER TABLET    Take 1 tablet by mouth 2 (two) times daily with meals.    CETIRIZINE (ZYRTEC) 10 MG CAP    1 po q day    IPRATROPIUM (ATROVENT) 42 MCG (0.06 %) NASAL SPRAY    USE 2 by Each Nostril route 2 (two) times daily as needed for Rhinitis    MAGNESIUM ORAL    Take by mouth once daily.    MOMETASONE (ELOCON) 0.1 % OINTMENT    aaa bid x 1-2 weeks then prn   Changed and/or Refilled Medications    Modified Medication Previous Medication     FLUTICASONE PROPIONATE (FLONASE) 50 MCG/ACTUATION NASAL SPRAY fluticasone propionate (FLONASE) 50 mcg/actuation nasal spray       Use 2 sprays to each nostril daily    Use 2 sprays to each nostril daily    FUROSEMIDE (LASIX) 40 MG TABLET furosemide (LASIX) 40 MG tablet       TAKE 1 TABLET (40 MG) BY MOUTH 3 TIMES DAILY    TAKE 1 TABLET (40 MG) BY MOUTH 3 TIMES DAILY    LEVOTHYROXINE (SYNTHROID) 125 MCG TABLET levothyroxine (SYNTHROID) 125 MCG tablet       Take 1 tablet (125 mcg total) by mouth before breakfast.    Take 1 tablet (125 mcg total) by mouth before breakfast.    METOPROLOL SUCCINATE (TOPROL-XL) 100 MG 24 HR TABLET metoprolol succinate (TOPROL-XL) 100 MG 24 hr tablet       Take 1 tablet (100 mg total) by mouth once daily.    TAKE 1 TABLET (100 MG) BY MOUTH 1 TIME A DAY    POTASSIUM CHLORIDE SA (K-DUR,KLOR-CON) 20 MEQ TABLET potassium chloride SA (K-DUR,KLOR-CON) 20 MEQ tablet       Take 1 tablet (20 mEq total) by mouth once daily.    TAKE 1 TABLET (20 MEQ) BY MOUTH 1 TIME A DAY    ROSUVASTATIN (CRESTOR) 10 MG TABLET rosuvastatin (CRESTOR) 10 MG tablet       Take 1 tablet (10 mg total) by mouth once daily.    Take 1 tablet (10 mg total) by mouth once daily.   Discontinued Medications    CETIRIZINE 10 MG CAP    Zyrtec   1 po q day    VITS A,C,E/LUTEIN/ZEAX/ZN/SUSNA (EYE HEALTH FORMULA ORAL)    (Omega 3 Fish Oil DHEA) 1 po q day      Medication List with Changes/Refills   Current Medications    ANTIOX #11/OM3/DHA/EPA/LUT/NAMAN (50+ ADULT EYE HEALTH ORAL)    Take by mouth.       Start Date: --        End Date: --    ASPIRIN 81 MG CHEWABLE TABLET    Take 81 mg by mouth once daily.       Start Date: 12/8/2009 End Date: --    CALCIUM-VITAMIN D3 (CALCIUM 500 + D) 500 MG-5 MCG (200 UNIT) PER TABLET    Take 1 tablet by mouth 2 (two) times daily with meals.       Start Date: --        End Date: --    CETIRIZINE (ZYRTEC) 10 MG CAP    1 po q day       Start Date: --        End Date: --    IPRATROPIUM (ATROVENT) 42 MCG  (0.06 %) NASAL SPRAY    USE 2 by Each Nostril route 2 (two) times daily as needed for Rhinitis       Start Date: 10/21/2024End Date: --    MAGNESIUM ORAL    Take by mouth once daily.       Start Date: --        End Date: --    MOMETASONE (ELOCON) 0.1 % OINTMENT    aaa bid x 1-2 weeks then prn       Start Date: 5/17/2022 End Date: --   Changed and/or Refilled Medications    Modified Medication Previous Medication    FLUTICASONE PROPIONATE (FLONASE) 50 MCG/ACTUATION NASAL SPRAY fluticasone propionate (FLONASE) 50 mcg/actuation nasal spray       Use 2 sprays to each nostril daily    Use 2 sprays to each nostril daily       Start Date: 1/29/2025 End Date: --    Start Date: 1/31/2024 End Date: 1/29/2025    FUROSEMIDE (LASIX) 40 MG TABLET furosemide (LASIX) 40 MG tablet       TAKE 1 TABLET (40 MG) BY MOUTH 3 TIMES DAILY    TAKE 1 TABLET (40 MG) BY MOUTH 3 TIMES DAILY       Start Date: 1/29/2025 End Date: --    Start Date: 12/6/2023 End Date: 1/29/2025    LEVOTHYROXINE (SYNTHROID) 125 MCG TABLET levothyroxine (SYNTHROID) 125 MCG tablet       Take 1 tablet (125 mcg total) by mouth before breakfast.    Take 1 tablet (125 mcg total) by mouth before breakfast.       Start Date: 1/29/2025 End Date: --    Start Date: 11/22/2024End Date: 1/29/2025    METOPROLOL SUCCINATE (TOPROL-XL) 100 MG 24 HR TABLET metoprolol succinate (TOPROL-XL) 100 MG 24 hr tablet       Take 1 tablet (100 mg total) by mouth once daily.    TAKE 1 TABLET (100 MG) BY MOUTH 1 TIME A DAY       Start Date: 1/29/2025 End Date: --    Start Date: 9/10/2024 End Date: 1/29/2025    POTASSIUM CHLORIDE SA (K-DUR,KLOR-CON) 20 MEQ TABLET potassium chloride SA (K-DUR,KLOR-CON) 20 MEQ tablet       Take 1 tablet (20 mEq total) by mouth once daily.    TAKE 1 TABLET (20 MEQ) BY MOUTH 1 TIME A DAY       Start Date: 1/29/2025 End Date: --    Start Date: 9/9/2024  End Date: 1/29/2025    ROSUVASTATIN (CRESTOR) 10 MG TABLET rosuvastatin (CRESTOR) 10 MG tablet       Take 1 tablet  (10 mg total) by mouth once daily.    Take 1 tablet (10 mg total) by mouth once daily.       Start Date: 1/29/2025 End Date: --    Start Date: 10/20/2024End Date: 1/29/2025   Discontinued Medications    CETIRIZINE 10 MG CAP    Zyrtec   1 po q day       Start Date: --        End Date: 1/29/2025    VITS A,C,E/LUTEIN/ZEAX/ZN/SUSAN (EYE HEALTH FORMULA ORAL)    (Omega 3 Fish Oil DHEA) 1 po q day       Start Date: --        End Date: 1/29/2025                This note was generated with the assistance of ambient listening technology. Verbal consent was obtained by the patient and accompanying visitor(s) for the recording of patient appointment to facilitate this note. I attest to having reviewed and edited the generated note for accuracy, though some syntax or spelling errors may persist. Please contact the author of this note for any clarification.

## 2025-01-31 DIAGNOSIS — E05.00 TOXIC DIFFUSE GOITER: ICD-10-CM

## 2025-01-31 LAB
FINAL PATHOLOGIC DIAGNOSIS: NORMAL
GROSS: NORMAL
Lab: NORMAL

## 2025-01-31 RX ORDER — LEVOTHYROXINE SODIUM 150 UG/1
150 TABLET ORAL
Qty: 90 TABLET | Refills: 0 | Status: SHIPPED | OUTPATIENT
Start: 2025-01-31

## 2025-01-31 NOTE — PROGRESS NOTES
I have reviewed the labs and am recommending the following:  TSH ABNORMAL-The TSH is abnormal.  We need to send a prescription for LEVOTHYROXINE at a dose of 150 mcg a day and refill x 1.   We need to place an order for a TSH in 2 months.      Health maintenance items that remain on your list that need to be arranged are listed below.   Please notify me if you are prepared to get them completed.    COVID-19 Vaccine(4 - 2024-25 season) due on 09/01/2024    Dr. Latrell Banks

## 2025-02-04 ENCOUNTER — HOSPITAL ENCOUNTER (OUTPATIENT)
Dept: RADIOLOGY | Facility: HOSPITAL | Age: 70
Discharge: HOME | End: 2025-02-04
Payer: COMMERCIAL

## 2025-02-04 DIAGNOSIS — K86.2 PANCREAS CYST: ICD-10-CM

## 2025-02-04 PROCEDURE — 74183 MRI ABD W/O CNTR FLWD CNTR: CPT | Mod: TC,PO

## 2025-02-04 PROCEDURE — A9585 GADOBUTROL INJECTION: HCPCS | Mod: PO

## 2025-02-04 PROCEDURE — 74183 MRI ABD W/O CNTR FLWD CNTR: CPT | Mod: 26,,, | Performed by: RADIOLOGY

## 2025-02-04 PROCEDURE — 25500020 PHARM REV CODE 255: Mod: PO

## 2025-02-04 RX ORDER — GADOBUTROL 604.72 MG/ML
8 INJECTION INTRAVENOUS
Status: COMPLETED | OUTPATIENT
Start: 2025-02-04 | End: 2025-02-04

## 2025-02-04 RX ADMIN — GADOBUTROL 8 ML: 604.72 INJECTION INTRAVENOUS at 04:02

## 2025-02-06 LAB
LEFT EYE DM RETINOPATHY: NEGATIVE
RIGHT EYE DM RETINOPATHY: NEGATIVE

## 2025-02-07 ENCOUNTER — PATIENT OUTREACH (OUTPATIENT)
Dept: ADMINISTRATIVE | Facility: HOSPITAL | Age: 70
End: 2025-02-07
Payer: COMMERCIAL

## 2025-02-11 ENCOUNTER — ANCILLARY ORDERS (OUTPATIENT)
Dept: FAMILY MEDICINE | Facility: CLINIC | Age: 70
End: 2025-02-11
Payer: COMMERCIAL

## 2025-02-11 ENCOUNTER — TELEPHONE (OUTPATIENT)
Dept: FAMILY MEDICINE | Facility: CLINIC | Age: 70
End: 2025-02-11
Payer: COMMERCIAL

## 2025-02-11 ENCOUNTER — HOSPITAL ENCOUNTER (OUTPATIENT)
Dept: RADIOLOGY | Facility: HOSPITAL | Age: 70
Discharge: HOME | End: 2025-02-11
Attending: FAMILY MEDICINE
Payer: COMMERCIAL

## 2025-02-11 DIAGNOSIS — R93.89 ABNORMAL FINDING PRESENT ON DIAGNOSTIC IMAGING OF UTERUS: Primary | ICD-10-CM

## 2025-02-11 DIAGNOSIS — R93.5 ABNORMAL ENDOMETRIAL ULTRASOUND: ICD-10-CM

## 2025-02-11 DIAGNOSIS — R93.89 ABNORMAL FINDING PRESENT ON DIAGNOSTIC IMAGING OF UTERUS: ICD-10-CM

## 2025-02-11 PROCEDURE — 76830 TRANSVAGINAL US NON-OB: CPT | Mod: TC,PO

## 2025-02-11 PROCEDURE — 76830 TRANSVAGINAL US NON-OB: CPT | Mod: 26,,, | Performed by: RADIOLOGY

## 2025-02-11 PROCEDURE — 76856 US EXAM PELVIC COMPLETE: CPT | Mod: 26,,, | Performed by: RADIOLOGY

## 2025-02-11 NOTE — TELEPHONE ENCOUNTER
----- Message from Latrell Banks MD sent at 2/11/2025  1:08 PM CST -----  As per our discussion on the phone about these findings, we need to have you see a GYN doctor.  I will place a referral for you to go to [place]. Please work with my team to arrange this.

## 2025-02-11 NOTE — TELEPHONE ENCOUNTER
Pt aware of test results . Pt aware that mis will be calling to schedule the referral     Mis, please call pt to schedule the referral

## 2025-02-11 NOTE — PROGRESS NOTES
As per our discussion on the phone about these findings, we need to have you see a GYN doctor.  I will place a referral for you to go to [place]. Please work with my team to arrange this.

## 2025-02-12 ENCOUNTER — OFFICE VISIT (OUTPATIENT)
Dept: OBSTETRICS AND GYNECOLOGY | Facility: CLINIC | Age: 70
End: 2025-02-12
Payer: COMMERCIAL

## 2025-02-12 VITALS
DIASTOLIC BLOOD PRESSURE: 84 MMHG | WEIGHT: 182.56 LBS | SYSTOLIC BLOOD PRESSURE: 138 MMHG | BODY MASS INDEX: 33.39 KG/M2

## 2025-02-12 DIAGNOSIS — R93.5 ABNORMAL ENDOMETRIAL ULTRASOUND: ICD-10-CM

## 2025-02-12 DIAGNOSIS — Z12.4 SCREENING FOR MALIGNANT NEOPLASM OF CERVIX: Primary | ICD-10-CM

## 2025-02-12 DIAGNOSIS — R93.89 ABNORMAL FINDING PRESENT ON DIAGNOSTIC IMAGING OF UTERUS: ICD-10-CM

## 2025-02-12 PROCEDURE — 1160F RVW MEDS BY RX/DR IN RCRD: CPT | Mod: CPTII,S$GLB,, | Performed by: OBSTETRICS & GYNECOLOGY

## 2025-02-12 PROCEDURE — 99999 PR PBB SHADOW E&M-EST. PATIENT-LVL IV: CPT | Mod: PBBFAC,,, | Performed by: OBSTETRICS & GYNECOLOGY

## 2025-02-12 PROCEDURE — 3008F BODY MASS INDEX DOCD: CPT | Mod: CPTII,S$GLB,, | Performed by: OBSTETRICS & GYNECOLOGY

## 2025-02-12 PROCEDURE — 99204 OFFICE O/P NEW MOD 45 MIN: CPT | Mod: S$GLB,,, | Performed by: OBSTETRICS & GYNECOLOGY

## 2025-02-12 PROCEDURE — 3075F SYST BP GE 130 - 139MM HG: CPT | Mod: CPTII,S$GLB,, | Performed by: OBSTETRICS & GYNECOLOGY

## 2025-02-12 PROCEDURE — 1159F MED LIST DOCD IN RCRD: CPT | Mod: CPTII,S$GLB,, | Performed by: OBSTETRICS & GYNECOLOGY

## 2025-02-12 PROCEDURE — 3079F DIAST BP 80-89 MM HG: CPT | Mod: CPTII,S$GLB,, | Performed by: OBSTETRICS & GYNECOLOGY

## 2025-02-12 PROCEDURE — 1126F AMNT PAIN NOTED NONE PRSNT: CPT | Mod: CPTII,S$GLB,, | Performed by: OBSTETRICS & GYNECOLOGY

## 2025-02-18 ENCOUNTER — RESULTS FOLLOW-UP (OUTPATIENT)
Dept: OBSTETRICS AND GYNECOLOGY | Facility: CLINIC | Age: 70
End: 2025-02-18

## 2025-02-20 ENCOUNTER — TELEPHONE (OUTPATIENT)
Dept: OBSTETRICS AND GYNECOLOGY | Facility: CLINIC | Age: 70
End: 2025-02-20
Payer: COMMERCIAL

## 2025-02-20 DIAGNOSIS — R93.89 ABNORMAL FINDING PRESENT ON DIAGNOSTIC IMAGING OF UTERUS: Primary | ICD-10-CM

## 2025-02-20 DIAGNOSIS — R93.5 ABNORMAL ENDOMETRIAL ULTRASOUND: ICD-10-CM

## 2025-02-21 ENCOUNTER — PATIENT MESSAGE (OUTPATIENT)
Dept: OBSTETRICS AND GYNECOLOGY | Facility: CLINIC | Age: 70
End: 2025-02-21
Payer: COMMERCIAL

## 2025-03-03 ENCOUNTER — OFFICE VISIT (OUTPATIENT)
Dept: OBSTETRICS AND GYNECOLOGY | Facility: CLINIC | Age: 70
End: 2025-03-03
Payer: COMMERCIAL

## 2025-03-03 VITALS
BODY MASS INDEX: 33.35 KG/M2 | WEIGHT: 182.31 LBS | SYSTOLIC BLOOD PRESSURE: 112 MMHG | DIASTOLIC BLOOD PRESSURE: 70 MMHG

## 2025-03-03 DIAGNOSIS — R93.89 THICKENED ENDOMETRIUM: Primary | ICD-10-CM

## 2025-03-03 DIAGNOSIS — N85.9 FLUID IN ENDOMETRIAL CAVITY: ICD-10-CM

## 2025-03-03 DIAGNOSIS — R93.5 ABNORMAL ENDOMETRIAL ULTRASOUND: ICD-10-CM

## 2025-03-03 PROCEDURE — 99499 UNLISTED E&M SERVICE: CPT | Mod: S$GLB,,, | Performed by: OBSTETRICS & GYNECOLOGY

## 2025-03-03 PROCEDURE — 99999 PR PBB SHADOW E&M-EST. PATIENT-LVL IV: CPT | Mod: PBBFAC,,, | Performed by: OBSTETRICS & GYNECOLOGY

## 2025-03-03 RX ORDER — FAMOTIDINE 20 MG/1
20 TABLET, FILM COATED ORAL
OUTPATIENT
Start: 2025-03-03

## 2025-03-03 RX ORDER — CEFAZOLIN SODIUM 2 G/50ML
2 SOLUTION INTRAVENOUS
OUTPATIENT
Start: 2025-03-03

## 2025-03-03 RX ORDER — MUPIROCIN 20 MG/G
OINTMENT TOPICAL
OUTPATIENT
Start: 2025-03-03

## 2025-03-03 NOTE — PROGRESS NOTES
Chief Complaint   Patient presents with    Pre-op Exam       History of Present Illness   69 y.o.    patient presents today for pre op    Past medical and surgical history reviewed.   I have reviewed the patient's medical history in detail and updated the computerized patient record.    Review of patient's allergies indicates:   Allergen Reactions    No known drug allergies              Physical Examination:  /70 (BP Location: Right arm, Patient Position: Sitting)   Wt 82.7 kg (182 lb 5.1 oz)   LMP 2011   BMI 33.35 kg/m²          Assessment:    1. Thickened endometrium  Full code    POCT glucose    Notify physician if BS > 180 for hysterectomy patients    Chlorhexidine (CHG) 2% Wipes    Notify Physician/Vital Signs Parameters Urine output less than 0.5mL/kg/hr (with indwelling catheter) or 30 mL/hr (without indwelling catheter) or blood glucose greater than 200 mg/dL    Notify physician    Diet NPO    Chlorohexidine Gluconate Bath    CBC auto differential    Place in Outpatient    Place CHRISTOPHE hose    Place sequential compression device    Basic Metabolic Panel      2. Fluid in endometrial cavity  Full code    POCT glucose    Notify physician if BS > 180 for hysterectomy patients    Chlorhexidine (CHG) 2% Wipes    Notify Physician/Vital Signs Parameters Urine output less than 0.5mL/kg/hr (with indwelling catheter) or 30 mL/hr (without indwelling catheter) or blood glucose greater than 200 mg/dL    Notify physician    Diet NPO    Chlorohexidine Gluconate Bath    CBC auto differential    Place in Outpatient    Place CHRISTOPHE hose    Place sequential compression device    Basic Metabolic Panel      3. Abnormal endometrial ultrasound  Full code    POCT glucose    Notify physician if BS > 180 for hysterectomy patients    Chlorhexidine (CHG) 2% Wipes    Notify Physician/Vital Signs Parameters Urine output less than 0.5mL/kg/hr (with indwelling catheter) or 30 mL/hr (without indwelling catheter) or blood  glucose greater than 200 mg/dL    Notify physician    Diet NPO    Chlorohexidine Gluconate Bath    CBC auto differential    Place in Outpatient    Place CHRISTOPHE hose    Place sequential compression device    Basic Metabolic Panel          Plan:  Hystero, D&C, Myosure  Consents  Post op 2 weeks

## 2025-03-13 PROBLEM — R93.89 THICKENED ENDOMETRIUM: Status: ACTIVE | Noted: 2025-03-13

## 2025-03-17 ENCOUNTER — PATIENT MESSAGE (OUTPATIENT)
Dept: OBSTETRICS AND GYNECOLOGY | Facility: CLINIC | Age: 70
End: 2025-03-17
Payer: COMMERCIAL

## 2025-03-26 ENCOUNTER — OFFICE VISIT (OUTPATIENT)
Dept: OBSTETRICS AND GYNECOLOGY | Facility: CLINIC | Age: 70
End: 2025-03-26
Payer: COMMERCIAL

## 2025-03-26 VITALS — BODY MASS INDEX: 33.47 KG/M2 | DIASTOLIC BLOOD PRESSURE: 80 MMHG | WEIGHT: 183 LBS | SYSTOLIC BLOOD PRESSURE: 128 MMHG

## 2025-03-26 DIAGNOSIS — Z09 POSTOP CHECK: Primary | ICD-10-CM

## 2025-03-26 PROCEDURE — 3079F DIAST BP 80-89 MM HG: CPT | Mod: CPTII,S$GLB,, | Performed by: OBSTETRICS & GYNECOLOGY

## 2025-03-26 PROCEDURE — 99024 POSTOP FOLLOW-UP VISIT: CPT | Mod: S$GLB,,, | Performed by: OBSTETRICS & GYNECOLOGY

## 2025-03-26 PROCEDURE — 3288F FALL RISK ASSESSMENT DOCD: CPT | Mod: CPTII,S$GLB,, | Performed by: OBSTETRICS & GYNECOLOGY

## 2025-03-26 PROCEDURE — 1159F MED LIST DOCD IN RCRD: CPT | Mod: CPTII,S$GLB,, | Performed by: OBSTETRICS & GYNECOLOGY

## 2025-03-26 PROCEDURE — 1126F AMNT PAIN NOTED NONE PRSNT: CPT | Mod: CPTII,S$GLB,, | Performed by: OBSTETRICS & GYNECOLOGY

## 2025-03-26 PROCEDURE — 1101F PT FALLS ASSESS-DOCD LE1/YR: CPT | Mod: CPTII,S$GLB,, | Performed by: OBSTETRICS & GYNECOLOGY

## 2025-03-26 PROCEDURE — 1160F RVW MEDS BY RX/DR IN RCRD: CPT | Mod: CPTII,S$GLB,, | Performed by: OBSTETRICS & GYNECOLOGY

## 2025-03-26 PROCEDURE — 3074F SYST BP LT 130 MM HG: CPT | Mod: CPTII,S$GLB,, | Performed by: OBSTETRICS & GYNECOLOGY

## 2025-03-26 PROCEDURE — 1157F ADVNC CARE PLAN IN RCRD: CPT | Mod: CPTII,S$GLB,, | Performed by: OBSTETRICS & GYNECOLOGY

## 2025-03-26 PROCEDURE — 99999 PR PBB SHADOW E&M-EST. PATIENT-LVL IV: CPT | Mod: PBBFAC,,, | Performed by: OBSTETRICS & GYNECOLOGY

## 2025-03-26 NOTE — PROGRESS NOTES
History of Present Illness:   Pateint presents today  2 weeks postop, status post D&C, with complaint of none.    Pathology: 1. ENDOCERVICAL CURETTINGS:     - UNREMARKABLE SQUAMOUS EPITHELIUM.     2. ENDOMETRIAL CURETTINGS:     - MUCUS ONLY.     3. UTERINE CONTENTS:     - BENIGN ENDOMETRIAL POLYP.__________________     Past medical and surgical history reviewed.   I have reviewed the patient's medical history in detail and updated the computerized patient record.    Physical exam:  Vital Signs: as above, reviewed.  Constitutional: She is oriented to person, place, and time. She appears well-developed and well-nourished. No distress.   HENT:   Head: Normocephalic and atraumatic.   Eyes: Conjunctivae and EOM are normal. No scleral icterus.   Neck: Normal range of motion. Neck supple. No tracheal deviation present.   Cardiovascular: Normal rate.    Pulmonary/Chest: Effort normal. No respiratory distress. She exhibits no tenderness.  Breasts: deferred  Abdominal: Soft. She exhibits no distension and no mass. There is no rebound and no guarding.   Genitourinary: no bleeding  Musculoskeletal: Normal range of motion.   Lymphadenopathy: No inguinal adenopathy present.   Neurological: She is alert and oriented to person, place, and time. Coordination normal.   Skin: Skin is warm and dry. She is not diaphoretic.   Psychiatric: She has a normal mood and affect.    Assessment:  Post op, neg path    Plan:  Follow up  1 year  annual

## 2025-03-28 ENCOUNTER — RESULTS FOLLOW-UP (OUTPATIENT)
Dept: FAMILY MEDICINE | Facility: CLINIC | Age: 70
End: 2025-03-28

## 2025-03-28 ENCOUNTER — LAB VISIT (OUTPATIENT)
Dept: LAB | Facility: HOSPITAL | Age: 70
End: 2025-03-28
Attending: FAMILY MEDICINE
Payer: COMMERCIAL

## 2025-03-28 DIAGNOSIS — E05.00 TOXIC DIFFUSE GOITER: ICD-10-CM

## 2025-03-28 LAB — TSH SERPL-ACNC: 1.12 UIU/ML (ref 0.4–4)

## 2025-03-28 PROCEDURE — 36415 COLL VENOUS BLD VENIPUNCTURE: CPT | Mod: PO

## 2025-03-28 PROCEDURE — 84443 ASSAY THYROID STIM HORMONE: CPT

## 2025-04-14 ENCOUNTER — HOSPITAL ENCOUNTER (OUTPATIENT)
Dept: RADIOLOGY | Facility: HOSPITAL | Age: 70
Discharge: HOME | End: 2025-04-14
Attending: NURSE PRACTITIONER
Payer: COMMERCIAL

## 2025-04-14 ENCOUNTER — OFFICE VISIT (OUTPATIENT)
Dept: FAMILY MEDICINE | Facility: CLINIC | Age: 70
End: 2025-04-14
Payer: COMMERCIAL

## 2025-04-14 VITALS
SYSTOLIC BLOOD PRESSURE: 139 MMHG | TEMPERATURE: 97 F | BODY MASS INDEX: 33.84 KG/M2 | DIASTOLIC BLOOD PRESSURE: 70 MMHG | HEART RATE: 62 BPM | HEIGHT: 62 IN | WEIGHT: 183.88 LBS | OXYGEN SATURATION: 100 %

## 2025-04-14 DIAGNOSIS — S99.911D INJURY OF RIGHT ANKLE, SUBSEQUENT ENCOUNTER: ICD-10-CM
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
## 2025-04-14 PROCEDURE — 73630 X-RAY EXAM OF FOOT: CPT | Mod: 26,RT,, | Performed by: RADIOLOGY

## 2025-04-14 PROCEDURE — 99999 PR PBB SHADOW E&M-EST. PATIENT-LVL V: CPT | Mod: PBBFAC,,, | Performed by: NURSE PRACTITIONER

## 2025-04-14 PROCEDURE — 73610 X-RAY EXAM OF ANKLE: CPT | Mod: TC,PO,RT

## 2025-04-14 PROCEDURE — 73630 X-RAY EXAM OF FOOT: CPT | Mod: TC,PO,RT

## 2025-04-14 PROCEDURE — 73610 X-RAY EXAM OF ANKLE: CPT | Mod: 26,RT,, | Performed by: RADIOLOGY

## 2025-04-14 NOTE — PATIENT INSTRUCTIONS
Avoid weight bearing  Ice to affected area 2-3x/d; 20 min on and 10 min off  Elevate affected extremity while lying/sitting  Report to ER immediately if symptoms worsen or persist    Wesley Tejada,     If you are due for any health screening(s) below please notify me so we can arrange them to be ordered and scheduled. Most healthy patients at your age complete them, but you are free to accept or refuse.     If you can't do it, I'll definitely understand. If you can, I'd certainly appreciate it!    All of your core healthy metrics are met.

## 2025-04-15 ENCOUNTER — RESULTS FOLLOW-UP (OUTPATIENT)
Dept: FAMILY MEDICINE | Facility: CLINIC | Age: 70
End: 2025-04-15

## 2025-04-16 ENCOUNTER — OFFICE VISIT (OUTPATIENT)
Dept: ORTHOPEDICS | Facility: CLINIC | Age: 70
End: 2025-04-16
Payer: COMMERCIAL

## 2025-04-16 VITALS — HEIGHT: 62 IN | WEIGHT: 183.88 LBS | BODY MASS INDEX: 33.84 KG/M2
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
## 2025-04-16 DIAGNOSIS — S93.401A SPRAIN OF RIGHT ANKLE, UNSPECIFIED LIGAMENT, INITIAL ENCOUNTER: Primary | ICD-10-CM

## 2025-04-16 DIAGNOSIS — S99.911D INJURY OF RIGHT ANKLE, SUBSEQUENT ENCOUNTER: ICD-10-CM

## 2025-04-16 PROCEDURE — 99999 PR PBB SHADOW E&M-EST. PATIENT-LVL III: CPT | Mod: PBBFAC,,, | Performed by: ORTHOPAEDIC SURGERY

## 2025-04-16 NOTE — LETTER
April 16, 2025    Mallory David  [address]  [place] [address]             [facility]  Orthopedics  [address]  [place] [address]  Phone: [phone]   April 16, 2025     Patient: Mallory David   YOB: 1955   Date of Visit: 4/16/2025       To Whom it May Concern:    Mallory David was seen in my clinic on 4/16/2025. She may return to work on 04/23/2025 .    Please excuse her from any work missed.    If you have any questions or concerns, please don't hesitate to call.    Sincerely,         Karson Cisse MD

## 2025-04-16 NOTE — PROGRESS NOTES
69 years old inversion injury to her right ankle about 4 days ago painful since then 6 on the pain scale no problems in the past     Exam shows some tenderness the ATFL nontender medially Achilles tendon is intact knee with squeeze test proximally, nontender 5th metatarsal     X-rays are negative     Assessment:  Right ankle sprain    Plan:  Activity modifications,  Ankle brace, cane, follow up in 3 weeks time      We performed a custom orthotic/brace fitting, adjusting and training with the patient. The patient demonstrated understanding and proper care. This was performed for 15 minutes.

## 2025-04-21 NOTE — PROGRESS NOTES
Subjective     Patient ID: Mallorykamilah David is a 69 y.o. female.    Chief Complaint: Ankle Injury    Ankle Injury   The incident occurred 3 to 5 days ago. The injury mechanism was a fall. The pain is present in the right ankle and right foot. The quality of the pain is described as aching. Pain severity now: mild to moderate. The pain has been Intermittent since onset. Pertinent negatives include no inability to bear weight, loss of motion, loss of sensation, muscle weakness, numbness or tingling. She reports no foreign bodies present. The symptoms are aggravated by movement and weight bearing. She has tried acetaminophen (Declines medication for pain) for the symptoms. The treatment provided no relief.     Past Medical History:   Diagnosis Date    *Atrial flutter     s/p ablation    Abnormal finding present on diagnostic imaging of uterus     Abnormal Pap smear of cervix     ASD (atrial septal defect), ostium secundum 1977    repaired    Benign hypertensive heart disease without heart failure     Endometriosis     Fatty liver     Hyperlipidemia     Hypertension     2009    Nodular goiter (nontoxic)     1999    Special screening for malignant neoplasms, colon 10/28/2014     Social History[1]  Past Surgical History:   Procedure Laterality Date    ABLATION OF DYSRHYTHMIC FOCUS      ASD REPAIR, SECUNDUM      CARPAL TUNNEL RELEASE  2007    right hand    COLONOSCOPY N/A 01/14/2020    Procedure: COLONOSCOPY;  Surgeon: Latrell Banks MD;  Location: [facility];  Service: Endoscopy;  Laterality: N/A;    COLONOSCOPY  02/04/2025    DILATION AND CURETTAGE OF UTERUS      EYE SURGERY  11/2019 12/2019 right eye     Heart ablation  2011    HYSTEROSCOPY WITH DILATION AND CURETTAGE OF UTERUS N/A 03/13/2025    Procedure: HYSTEROSCOPY, WITH DILATION AND CURETTAGE OF UTERUS/ myosure;  Surgeon: Geovany Reeves MD;  Location: [facility] OR;  Service: OB/GYN;  Laterality: N/A;    OOPHORECTOMY Left     Open Heart Surgery  1970    OVARIAN  CYST SURGERY Left 1981       Review of Systems   Constitutional: Negative.  Negative for activity change and unexpected weight change.   HENT: Negative.  Negative for hearing loss and trouble swallowing.    Eyes: Negative.  Negative for discharge and visual disturbance.   Respiratory: Negative.  Negative for chest tightness and wheezing.    Cardiovascular: Negative.  Negative for chest pain and palpitations.   Gastrointestinal: Negative.  Negative for blood in stool, constipation, diarrhea and vomiting.   Endocrine: Negative.  Negative for polydipsia and polyuria.   Genitourinary: Negative.  Negative for difficulty urinating, dysuria, hematuria and menstrual problem.   Musculoskeletal:  Positive for arthralgias (right ankle) and joint swelling (right ankle). Negative for neck pain.   Integumentary:  Negative.   Allergic/Immunologic: Negative.    Neurological: Negative.  Negative for tingling, weakness, numbness and headaches.   Psychiatric/Behavioral: Negative.  Negative for confusion and dysphoric mood.           Objective     Physical Exam  Vitals and nursing note reviewed.   Constitutional:       Appearance: Normal appearance.   HENT:      Head: Normocephalic.      Right Ear: Tympanic membrane, ear canal and external ear normal.      Left Ear: Tympanic membrane, ear canal and external ear normal.      Nose: Nose normal.      Mouth/Throat:      Mouth: Mucous membranes are moist.      Pharynx: Oropharynx is clear.   Eyes:      Conjunctiva/sclera: Conjunctivae normal.      Pupils: Pupils are equal, round, and reactive to light.   Cardiovascular:      Rate and Rhythm: Normal rate and regular rhythm.      Pulses: Normal pulses.      Heart sounds: Normal heart sounds.   Pulmonary:      Effort: Pulmonary effort is normal.      Breath sounds: Normal breath sounds.   Abdominal:      General: Bowel sounds are normal.      Palpations: Abdomen is soft.   Musculoskeletal:         General: Normal range of motion.      Cervical  back: Normal range of motion and neck supple.      Right ankle: Swelling (mild) present. No deformity, ecchymosis or lacerations. Tenderness present. Normal range of motion.   Skin:     General: Skin is warm and dry.      Capillary Refill: Capillary refill takes 2 to 3 seconds.   Neurological:      Mental Status: She is alert and oriented to person, place, and time.   Psychiatric:         Mood and Affect: Mood normal.         Behavior: Behavior normal.         Thought Content: Thought content normal.         Judgment: Judgment normal.            Assessment and Plan     1. Fall, subsequent encounter  2. Injury of right ankle, subsequent encounter  -     X-Ray Ankle Complete Right; Future; Expected date: 04/14/2025  -     X-Ray Foot Complete Right; Future; Expected date: 04/14/2025  -     Ambulatory referral/consult to Orthopedics; Future; Expected date: 04/21/2025  -     miscellaneous medical supply Kit; 1 each by Misc.(Non-Drug; Combo Route) route daily as needed. 2 crutches  Dispense: 1 kit; Refill: 0  Avoid weight bearing  Ice to affected area 2-3x/d; 20 min on and 10 min off  Elevate affected extremity while lying/sitting  Report to ER immediately if symptoms worsen or persist               No follow-ups on file.         [1]   Social History  Socioeconomic History    Marital status:      Spouse name: Alexi    Number of children: 2   Occupational History    Occupation:      Employer: [employer]   Tobacco Use    Smoking status: Never    Smokeless tobacco: Never   Substance and Sexual Activity    Alcohol use: Yes     Comment: occasional glass of red wine    Drug use: No    Sexual activity: Not Currently     Partners: Male     Birth control/protection: Post-menopausal     Social Drivers of Health     Financial Resource Strain: Low Risk  (1/10/2025)    Overall Financial Resource Strain (CARDIA)     Difficulty of Paying Living Expenses: Not hard at all   Food Insecurity: No Food Insecurity  (1/10/2025)    Hunger Vital Sign     Worried About Running Out of Food in the Last Year: Never true     Ran Out of Food in the Last Year: Never true   Transportation Needs: No Transportation Needs (1/4/2024)    PRAPARE - Transportation     Lack of Transportation (Medical): No     Lack of Transportation (Non-Medical): No   Physical Activity: Insufficiently Active (1/10/2025)    Exercise Vital Sign     Days of Exercise per Week: 3 days     Minutes of Exercise per Session: 20 min   Stress: No Stress Concern Present (1/10/2025)    [employer] - Occupational Stress Questionnaire     Feeling of Stress : Not at all   Housing Stability: Low Risk  (1/4/2024)    Housing Stability Vital Sign     Unable to Pay for Housing in the Last Year: No     Number of Places Lived in the Last Year: 1     Unstable Housing in the Last Year: No

## 2025-05-05 DIAGNOSIS — E05.00 TOXIC DIFFUSE GOITER: ICD-10-CM

## 2025-05-05 RX ORDER — LEVOTHYROXINE SODIUM 150 UG/1
150 TABLET ORAL
Qty: 90 TABLET | Refills: 2 | Status: SHIPPED | OUTPATIENT
Start: 2025-05-05

## 2025-05-05 NOTE — TELEPHONE ENCOUNTER
No care due was identified.  [facility] Embedded Care Due Messages. Reference number: [id number].   5/05/2025 12:10:07 PM CDT

## 2025-05-06 NOTE — TELEPHONE ENCOUNTER
Refill Decision Note   Mallory David  is requesting a refill authorization.  Brief Assessment and Rationale for Refill:  Approve     Medication Therapy Plan:        Comments:     Note composed:8:44 PM 05/05/2025

## 2025-05-13 ENCOUNTER — OFFICE VISIT (OUTPATIENT)
Dept: CARDIOLOGY | Facility: CLINIC | Age: 70
End: 2025-05-13
Payer: COMMERCIAL

## 2025-05-13 VITALS
SYSTOLIC BLOOD PRESSURE: 122 MMHG | DIASTOLIC BLOOD PRESSURE: 82 MMHG | HEART RATE: 63 BPM | OXYGEN SATURATION: 100 % | WEIGHT: 181.69 LBS | BODY MASS INDEX: 33.43 KG/M2 | HEIGHT: 62 IN

## 2025-05-13 DIAGNOSIS — I10 PRIMARY HYPERTENSION: ICD-10-CM

## 2025-05-13 DIAGNOSIS — I48.3 TYPICAL ATRIAL FLUTTER: ICD-10-CM

## 2025-05-13 DIAGNOSIS — Q21.11 OSTIUM SECUNDUM TYPE ATRIAL SEPTAL DEFECT: ICD-10-CM

## 2025-05-13 DIAGNOSIS — I47.10 SVT (SUPRAVENTRICULAR TACHYCARDIA): Primary | ICD-10-CM

## 2025-05-13 DIAGNOSIS — E78.2 MIXED HYPERLIPIDEMIA: ICD-10-CM

## 2025-05-13 DIAGNOSIS — Q21.11 ASD (ATRIAL SEPTAL DEFECT), OSTIUM SECUNDUM: ICD-10-CM

## 2025-05-13 PROCEDURE — 1159F MED LIST DOCD IN RCRD: CPT | Mod: CPTII,S$GLB,, | Performed by: INTERNAL MEDICINE

## 2025-05-13 PROCEDURE — 3079F DIAST BP 80-89 MM HG: CPT | Mod: CPTII,S$GLB,, | Performed by: INTERNAL MEDICINE

## 2025-05-13 PROCEDURE — 1126F AMNT PAIN NOTED NONE PRSNT: CPT | Mod: CPTII,S$GLB,, | Performed by: INTERNAL MEDICINE

## 2025-05-13 PROCEDURE — 1160F RVW MEDS BY RX/DR IN RCRD: CPT | Mod: CPTII,S$GLB,, | Performed by: INTERNAL MEDICINE

## 2025-05-13 PROCEDURE — 1100F PTFALLS ASSESS-DOCD GE2>/YR: CPT | Mod: CPTII,S$GLB,, | Performed by: INTERNAL MEDICINE

## 2025-05-13 PROCEDURE — 1157F ADVNC CARE PLAN IN RCRD: CPT | Mod: CPTII,S$GLB,, | Performed by: INTERNAL MEDICINE

## 2025-05-13 PROCEDURE — 3008F BODY MASS INDEX DOCD: CPT | Mod: CPTII,S$GLB,, | Performed by: INTERNAL MEDICINE

## 2025-05-13 PROCEDURE — 3074F SYST BP LT 130 MM HG: CPT | Mod: CPTII,S$GLB,, | Performed by: INTERNAL MEDICINE

## 2025-05-13 PROCEDURE — 3288F FALL RISK ASSESSMENT DOCD: CPT | Mod: CPTII,S$GLB,, | Performed by: INTERNAL MEDICINE

## 2025-05-13 PROCEDURE — 99214 OFFICE O/P EST MOD 30 MIN: CPT | Mod: S$GLB,,, | Performed by: INTERNAL MEDICINE

## 2025-05-13 PROCEDURE — 99999 PR PBB SHADOW E&M-EST. PATIENT-LVL IV: CPT | Mod: PBBFAC,,, | Performed by: INTERNAL MEDICINE

## 2025-05-13 NOTE — PROGRESS NOTES
Subjective:   Patient ID:  Malloryleslee David is a 69 y.o. female who presents for follow-up of No chief complaint on file.  11-24  Patient denies palpitations.Pt with CP- dull, ache at rest or exertion lasting a minute. No SOB, dizziness     Today  Patient denies CP, angina or anginal equivalent.  Stress test 12-24 nml  Hypertension  This is a chronic problem. The current episode started more than 1 year ago. The problem has been gradually improving since onset. The problem is controlled. Pertinent negatives include no chest pain, palpitations or shortness of breath. Past treatments include beta blockers and diuretics. The current treatment provides moderate improvement. There are no compliance problems.    Hyperlipidemia  This is a chronic problem. The current episode started more than 1 year ago. The problem is controlled. Pertinent negatives include no chest pain or shortness of breath. Current antihyperlipidemic treatment includes statins. The current treatment provides moderate improvement of lipids. There are no compliance problems.    Edema  This is a chronic problem. The current episode started more than 1 year ago. The problem occurs rarely. The problem has been gradually improving. Pertinent negatives include no chest pain. Nothing aggravates the symptoms. She has tried rest for the symptoms.   Dizziness:   Chronicity:  New  Onset:  1 to 4 weeks ago  Progression since onset:  Waxing and waning  Severity:  Mild  Duration:  Very brief  Dizziness characteristics:  Lightheaded/impending faintno palpitations and no chest pain.  Aggravated by:  Nothing  Treatments tried:  Rest  Improvements on treatment:  Moderate      Review of Systems   Constitutional: Negative. Negative for weight gain.   HENT: Negative.     Eyes: Negative.    Cardiovascular: Negative.  Negative for chest pain, leg swelling and palpitations.   Respiratory: Negative.  Negative for shortness of breath.    Endocrine: Negative.     Hematologic/Lymphatic: Negative.    Skin: Negative.    Musculoskeletal:  Negative for muscle weakness.   Gastrointestinal: Negative.    Genitourinary: Negative.    Neurological: Negative.  Negative for dizziness.   Psychiatric/Behavioral: Negative.     Allergic/Immunologic: Negative.    All other systems reviewed and are negative.    Family History   Problem Relation Name Age of Onset    Colon cancer Father Tato PerdueJr waleska.     Diabetes Father Tato [place] Jr Emiliano.     Hypertension Mother Poonam Luis Cummings     Arthritis Mother Poonam Luis Cummings     Hearing loss Mother Poonam Luis Cummings         cholesteatoma surgery    Aneurysm Brother          this was of his heart.    Birth defects Brother Tato JONASTimmy Cummings, III         Heart murmer    Heart disease Brother Tato JONASTimmy Cummings, III     Hypertension Brother Tato ZUNIGA Emiliano, III     Cancer Brother Tato ZUNIGA Emiliano, III         Prostate Cancer    Diabetes Brother Moy BYRNES Emiliano     Diabetes Brother Santosh LATHAMTimmy Cummings     Hypertension Brother Santosh LATHAMTimmy Cummings     Breast cancer Sister Haley Ramírez     Cancer Sister Haley Ramírez         Breast    Diabetes Sister Malia Cortez     Heart disease Sister Malia Cortez     Hypertension Sister Malia Tovardrick     Stroke Sister Malia Cortez     Melanoma Neg Hx      Uterine cancer Neg Hx      Cervical cancer Neg Hx       Past Medical History:   Diagnosis Date    *Atrial flutter     s/p ablation    Abnormal finding present on diagnostic imaging of uterus     Abnormal Pap smear of cervix     ASD (atrial septal defect), ostium secundum 1977    repaired    Benign hypertensive heart disease without heart failure     Endometriosis     Fatty liver     Hyperlipidemia     Hypertension     2009    Nodular goiter (nontoxic)     1999    Special screening for malignant neoplasms, colon 10/28/2014     Social History[1]  Medications Ordered Prior to  Encounter[2]  Review of patient's allergies indicates:   Allergen Reactions    No known drug allergies        Objective:     Physical Exam  Vitals and nursing note reviewed.   Constitutional:       Appearance: She is well-developed.   HENT:      Head: Normocephalic and atraumatic.   Eyes:      Conjunctiva/sclera: Conjunctivae normal.      Pupils: Pupils are equal, round, and reactive to light.   Cardiovascular:      Rate and Rhythm: Normal rate and regular rhythm.      Pulses: Intact distal pulses.      Heart sounds: Normal heart sounds.   Pulmonary:      Effort: Pulmonary effort is normal.      Breath sounds: Normal breath sounds.   Abdominal:      General: Bowel sounds are normal.      Palpations: Abdomen is soft.   Musculoskeletal:         General: Normal range of motion.      Cervical back: Normal range of motion and neck supple.   Skin:     General: Skin is warm and dry.   Neurological:      Mental Status: She is alert and oriented to person, place, and time.         Assessment:     1. SVT (supraventricular tachycardia)    2. Typical atrial flutter    3. Ostium secundum type atrial septal defect    4. Primary hypertension    5. Mixed hyperlipidemia    6. ASD (atrial septal defect), ostium secundum        Plan:     SVT (supraventricular tachycardia)    Typical atrial flutter    Ostium secundum type atrial septal defect    Primary hypertension    Mixed hyperlipidemia    ASD (atrial septal defect), ostium secundum      Continue metoprolol, lasix-htn/svt  Continue statin-hlp  Exercise         [1]   Social History  Socioeconomic History    Marital status:      Spouse name: Alexi    Number of children: 2   Occupational History    Occupation:      Employer: [employer]   Tobacco Use    Smoking status: Never    Smokeless tobacco: Never   Substance and Sexual Activity    Alcohol use: Yes     Comment: occasional glass of red wine    Drug use: No    Sexual activity: Not Currently     Partners:  Male     Birth control/protection: Post-menopausal     Social Drivers of Health     Financial Resource Strain: Low Risk  (1/10/2025)    Overall Financial Resource Strain (CARDIA)     Difficulty of Paying Living Expenses: Not hard at all   Food Insecurity: No Food Insecurity (1/10/2025)    Hunger Vital Sign     Worried About Running Out of Food in the Last Year: Never true     Ran Out of Food in the Last Year: Never true   Transportation Needs: No Transportation Needs (1/4/2024)    PRAPARE - Transportation     Lack of Transportation (Medical): No     Lack of Transportation (Non-Medical): No   Physical Activity: Insufficiently Active (1/10/2025)    Exercise Vital Sign     Days of Exercise per Week: 3 days     Minutes of Exercise per Session: 20 min   Stress: No Stress Concern Present (1/10/2025)    [place] [employer] - Occupational Stress Questionnaire     Feeling of Stress : Not at all   Housing Stability: Low Risk  (1/4/2024)    Housing Stability Vital Sign     Unable to Pay for Housing in the Last Year: No     Number of Places Lived in the Last Year: 1     Unstable Housing in the Last Year: No   [2]   Current Outpatient Medications on File Prior to Visit   Medication Sig Dispense Refill    ANTIOX #11/OM3/DHA/EPA/LUT/NAMAN (50+ ADULT EYE HEALTH ORAL) Take by mouth.      aspirin 81 MG chewable tablet Take 81 mg by mouth once daily.      calcium-vitamin D3 (CALCIUM 500 + D) 500 mg-5 mcg (200 unit) per tablet Take 1 tablet by mouth 2 (two) times daily with meals.      cetirizine (ZYRTEC) 10 mg Cap 1 po q day      fluticasone propionate (FLONASE) 50 mcg/actuation nasal spray Use 2 sprays to each nostril daily 16 g 11    furosemide (LASIX) 40 MG tablet TAKE 1 TABLET (40 MG) BY MOUTH 3 TIMES DAILY 270 tablet 3    ipratropium (ATROVENT) 42 mcg (0.06 %) nasal spray USE 2 by Each Nostril route 2 (two) times daily as needed for Rhinitis 15 mL 6    levothyroxine (SYNTHROID) 150 MCG tablet Take 1 tablet (150  mcg total) by mouth before breakfast. 90 tablet 2    MAGNESIUM ORAL Take by mouth once daily.      metoprolol succinate (TOPROL-XL) 100 MG 24 hr tablet Take 1 tablet (100 mg total) by mouth once daily. 90 tablet 3    miscellaneous medical supply Kit 1 each by Misc.(Non-Drug; Combo Route) route daily as needed. 2 crutches 1 kit 0    mometasone (ELOCON) 0.1 % ointment aaa bid x 1-2 weeks then prn 45 g 0    potassium chloride SA (K-DUR,KLOR-CON) 20 MEQ tablet Take 1 tablet (20 mEq total) by mouth once daily. 90 tablet 3    rosuvastatin (CRESTOR) 10 MG tablet Take 1 tablet (10 mg total) by mouth once daily. 90 tablet 3     No current facility-administered medications on file prior to visit.

## 2025-07-01 ENCOUNTER — OFFICE VISIT (OUTPATIENT)
Facility: CLINIC | Age: 70
End: 2025-07-01
Payer: COMMERCIAL

## 2025-07-01 DIAGNOSIS — Z12.83 SKIN CANCER SCREENING: Primary | ICD-10-CM

## 2025-07-01 DIAGNOSIS — L70.8 DILATED PORE OF WINER OF CHEEK: ICD-10-CM

## 2025-07-01 DIAGNOSIS — D69.2 PURPURA: ICD-10-CM

## 2025-07-01 PROCEDURE — 99213 OFFICE O/P EST LOW 20 MIN: CPT | Mod: S$GLB,,, | Performed by: DERMATOLOGY

## 2025-07-01 PROCEDURE — 1160F RVW MEDS BY RX/DR IN RCRD: CPT | Mod: CPTII,S$GLB,, | Performed by: DERMATOLOGY

## 2025-07-01 PROCEDURE — 99999 PR PBB SHADOW E&M-EST. PATIENT-LVL III: CPT | Mod: PBBFAC,,, | Performed by: DERMATOLOGY

## 2025-07-01 PROCEDURE — 1157F ADVNC CARE PLAN IN RCRD: CPT | Mod: CPTII,S$GLB,, | Performed by: DERMATOLOGY

## 2025-07-01 PROCEDURE — 3288F FALL RISK ASSESSMENT DOCD: CPT | Mod: CPTII,S$GLB,, | Performed by: DERMATOLOGY

## 2025-07-01 PROCEDURE — 1159F MED LIST DOCD IN RCRD: CPT | Mod: CPTII,S$GLB,, | Performed by: DERMATOLOGY

## 2025-07-01 PROCEDURE — 1101F PT FALLS ASSESS-DOCD LE1/YR: CPT | Mod: CPTII,S$GLB,, | Performed by: DERMATOLOGY

## 2025-07-01 NOTE — PROGRESS NOTES
Subjective:      Patient ID:  Mallorychiara David is a 69 y.o. female who presents for   Chief Complaint   Patient presents with    Skin Check     TBSC     HPI    Established Patient  [facility]  Spot on left cheek , thumb turning purple this morning noticed. , spot at right leg- also bruise like . Tender. 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  Denies cold exposure     Denies recent illness  Denies blood thinner        no Phx of NMSC.  Fhx of NMSC  + Mother and Father hx of BCC  no Fhx of melanoma      Review of Systems   Constitutional:  Negative for fever, chills and fatigue.   Skin:  Positive for daily sunscreen use, activity-related sunscreen use and wears hat.   Hematologic/Lymphatic: Does not bruise/bleed easily.     Objective:   Physical Exam   Constitutional: She appears well-developed and well-nourished. No distress.   HENT:   Mouth/Throat: Lips normal.    Eyes: Lids are normal.  No conjunctival no injection.   Cardiovascular:  There is no local extremity swelling and no dependent edema.             Neurological: She is alert and oriented to person, place, and time. She is not disoriented.   Psychiatric: She has a normal mood and affect.   Skin:   Areas Examined (abnormalities noted in diagram):   Scalp / Hair Palpated and Inspected  Head / Face Inspection Performed  Neck Inspection Performed  Chest / Axilla Inspection Performed  Abdomen Inspection Performed  Genitals / Buttocks / Groin Inspection Performed  Back Inspection Performed  RUE Inspected  LUE Inspection Performed  RLE Inspected  LLE Inspection Performed  Nails and Digits Inspection Performed                 Diagram Legend     Erythematous scaling macule/papule c/w actinic keratosis       Vascular papule c/w angioma      Pigmented verrucoid papule/plaque c/w seborrheic keratosis      Yellow umbilicated papule c/w sebaceous hyperplasia      Irregularly shaped tan macule  c/w lentigo     1-2 mm smooth white papules consistent with Milia      Movable subcutaneous cyst with punctum c/w epidermal inclusion cyst      Subcutaneous movable cyst c/w pilar cyst      Firm pink to brown papule c/w dermatofibroma      Pedunculated fleshy papule(s) c/w skin tag(s)      Evenly pigmented macule c/w junctional nevus     Mildly variegated pigmented, slightly irregular-bordered macule c/w mildly atypical nevus      Flesh colored to evenly pigmented papule c/w intradermal nevus       Pink pearly papule/plaque c/w basal cell carcinoma      Erythematous hyperkeratotic cursted plaque c/w SCC      Surgical scar with no sign of skin cancer recurrence      Open and closed comedones      Inflammatory papules and pustules      Verrucoid papule consistent consistent with wart     Erythematous eczematous patches and plaques     Dystrophic onycholytic nail with subungual debris c/w onychomycosis     Umbilicated papule    Erythematous-base heme-crusted tan verrucoid plaque consistent with inflamed seborrheic keratosis     Erythematous Silvery Scaling Plaque c/w Psoriasis     See annotation      Assessment / Plan:        Skin cancer screening    Upper body skin examination performed today including at least 6 points as noted in physical examination. No lesions suspicious for malignancy noted.      Purpura  Right thumb  Asymptomatic, isolated lesion. Began this morning  Pusle wnl, states possibly trauma but unsure  Reasonable to monitor for worsening or other symptoms      Dilated pore of Don of cheek    Lesion incised with #11 blade and drained on today's date             No follow-ups on file.

## 2025-07-29 ENCOUNTER — OFFICE VISIT (OUTPATIENT)
Dept: FAMILY MEDICINE | Facility: CLINIC | Age: 70
End: 2025-07-29
Payer: COMMERCIAL

## 2025-07-29 VITALS
HEART RATE: 73 BPM | BODY MASS INDEX: 33.64 KG/M2 | RESPIRATION RATE: 18 BRPM | HEIGHT: 62 IN | OXYGEN SATURATION: 98 % | TEMPERATURE: 97 F | WEIGHT: 182.81 LBS | DIASTOLIC BLOOD PRESSURE: 78 MMHG | SYSTOLIC BLOOD PRESSURE: 112 MMHG

## 2025-07-29 DIAGNOSIS — J06.9 UPPER RESPIRATORY TRACT INFECTION, UNSPECIFIED TYPE: Primary | ICD-10-CM

## 2025-07-29 DIAGNOSIS — R05.9 COUGH, UNSPECIFIED TYPE: ICD-10-CM

## 2025-07-29 PROCEDURE — 3008F BODY MASS INDEX DOCD: CPT | Mod: CPTII,S$GLB,, | Performed by: NURSE PRACTITIONER

## 2025-07-29 PROCEDURE — 1160F RVW MEDS BY RX/DR IN RCRD: CPT | Mod: CPTII,S$GLB,, | Performed by: NURSE PRACTITIONER

## 2025-07-29 PROCEDURE — 1126F AMNT PAIN NOTED NONE PRSNT: CPT | Mod: CPTII,S$GLB,, | Performed by: NURSE PRACTITIONER

## 2025-07-29 PROCEDURE — 99213 OFFICE O/P EST LOW 20 MIN: CPT | Mod: S$GLB,,, | Performed by: NURSE PRACTITIONER

## 2025-07-29 PROCEDURE — 3078F DIAST BP <80 MM HG: CPT | Mod: CPTII,S$GLB,, | Performed by: NURSE PRACTITIONER

## 2025-07-29 PROCEDURE — 1157F ADVNC CARE PLAN IN RCRD: CPT | Mod: CPTII,S$GLB,, | Performed by: NURSE PRACTITIONER

## 2025-07-29 PROCEDURE — 3074F SYST BP LT 130 MM HG: CPT | Mod: CPTII,S$GLB,, | Performed by: NURSE PRACTITIONER

## 2025-07-29 PROCEDURE — 3288F FALL RISK ASSESSMENT DOCD: CPT | Mod: CPTII,S$GLB,, | Performed by: NURSE PRACTITIONER

## 2025-07-29 PROCEDURE — 99999 PR PBB SHADOW E&M-EST. PATIENT-LVL V: CPT | Mod: PBBFAC,,, | Performed by: NURSE PRACTITIONER

## 2025-07-29 PROCEDURE — 1100F PTFALLS ASSESS-DOCD GE2>/YR: CPT | Mod: CPTII,S$GLB,, | Performed by: NURSE PRACTITIONER

## 2025-07-29 PROCEDURE — 1159F MED LIST DOCD IN RCRD: CPT | Mod: CPTII,S$GLB,, | Performed by: NURSE PRACTITIONER

## 2025-07-29 RX ORDER — CEFDINIR 300 MG/1
300 CAPSULE ORAL 2 TIMES DAILY
Qty: 20 CAPSULE | Refills: 0 | Status: SHIPPED | OUTPATIENT
Start: 2025-07-29 | End: 2025-08-08

## 2025-07-29 RX ORDER — PROMETHAZINE HYDROCHLORIDE AND DEXTROMETHORPHAN HYDROBROMIDE 6.25; 15 MG/5ML; MG/5ML
5 SYRUP ORAL 2 TIMES DAILY PRN
Qty: 118 ML | Refills: 0 | Status: SHIPPED | OUTPATIENT
Start: 2025-07-29 | End: 2025-08-08

## 2025-07-29 NOTE — PROGRESS NOTES
Subjective     Patient ID: Mallorykamilah David is a 69 y.o. female.    Chief Complaint: Cough (For 1 week)    Cough  This is a new problem. The current episode started in the past 7 days. The problem has been unchanged. The problem occurs every few minutes. The cough is Productive of sputum. Associated symptoms include nasal congestion, postnasal drip and rhinorrhea. Pertinent negatives include no chest pain, chills, ear congestion, ear pain, fever, headaches, heartburn, hemoptysis, myalgias, rash, sore throat, shortness of breath, sweats, weight loss or wheezing. Nothing aggravates the symptoms. Treatments tried: mucinex, nasal spray. The treatment provided no relief. There is no history of asthma, bronchiectasis, bronchitis, COPD, emphysema, environmental allergies or pneumonia.     Past Medical History:   Diagnosis Date    *Atrial flutter     s/p ablation    Abnormal finding present on diagnostic imaging of uterus     Abnormal Pap smear of cervix     ASD (atrial septal defect), ostium secundum 1977    repaired    Benign hypertensive heart disease without heart failure     Endometriosis     Fatty liver     Hyperlipidemia     Hypertension     2009    Nodular goiter (nontoxic)     1999    Special screening for malignant neoplasms, colon 10/28/2014     Social History[1]  Past Surgical History:   Procedure Laterality Date    ABLATION OF DYSRHYTHMIC FOCUS      ASD REPAIR, SECUNDUM      CARPAL TUNNEL RELEASE  2007    right hand    COLONOSCOPY N/A 01/14/2020    Procedure: COLONOSCOPY;  Surgeon: Latrell Banks MD;  [facility];  Service: Endoscopy;  Laterality: N/A;    COLONOSCOPY  02/04/2025    DILATION AND CURETTAGE OF UTERUS      EYE SURGERY  11/2019 12/2019 right eye     Heart ablation  2011    HYSTEROSCOPY WITH DILATION AND CURETTAGE OF UTERUS N/A 03/13/2025    Procedure: HYSTEROSCOPY, WITH DILATION AND CURETTAGE OF UTERUS/ myosure;  Surgeon: Geovany Reeves MD;  Location[facility] OR;  Service: OB/GYN;   Laterality: N/A;    OOPHORECTOMY Left     Open Heart Surgery  1970    OVARIAN CYST SURGERY Left 1981       Review of Systems   Constitutional: Negative.  Negative for chills, fever and weight loss.   HENT:  Positive for postnasal drip, rhinorrhea and sinus pressure/congestion. Negative for ear pain and sore throat.    Eyes: Negative.    Respiratory:  Positive for cough. Negative for hemoptysis, shortness of breath and wheezing.    Cardiovascular: Negative.  Negative for chest pain.   Gastrointestinal: Negative.  Negative for heartburn.   Endocrine: Negative.    Genitourinary: Negative.    Musculoskeletal: Negative.  Negative for myalgias.   Integumentary:  Negative for rash. Negative.   Allergic/Immunologic: Negative.  Negative for environmental allergies.   Neurological: Negative.  Negative for headaches.   Psychiatric/Behavioral: Negative.            Objective     Physical Exam  Vitals and nursing note reviewed.   Constitutional:       Appearance: Normal appearance.   HENT:      Head: Normocephalic.      Right Ear: Tympanic membrane, ear canal and external ear normal.      Left Ear: Tympanic membrane, ear canal and external ear normal.      Nose: Congestion present.      Mouth/Throat:      Mouth: Mucous membranes are moist.      Pharynx: Oropharynx is clear.   Eyes:      Conjunctiva/sclera: Conjunctivae normal.      Pupils: Pupils are equal, round, and reactive to light.   Cardiovascular:      Rate and Rhythm: Normal rate and regular rhythm.      Pulses: Normal pulses.      Heart sounds: Normal heart sounds.   Pulmonary:      Effort: Pulmonary effort is normal.      Breath sounds: Normal breath sounds.   Abdominal:      General: Bowel sounds are normal.      Palpations: Abdomen is soft.   Musculoskeletal:         General: Normal range of motion.      Cervical back: Normal range of motion and neck supple.   Skin:     General: Skin is warm and dry.      Capillary Refill: Capillary refill takes 2 to 3 seconds.    Neurological:      Mental Status: She is alert and oriented to person, place, and time.   Psychiatric:         Mood and Affect: Mood normal.         Behavior: Behavior normal.         Thought Content: Thought content normal.         Judgment: Judgment normal.            Assessment and Plan     1. Upper respiratory tract infection, unspecified type  2. Cough, unspecified type  -     promethazine-dextromethorphan (PROMETHAZINE-DM) 6.25-15 mg/5 mL Syrp; Take 5 mLs by mouth 2 (two) times daily as needed.  Dispense: 118 mL; Refill: 0  -     cefdinir (OMNICEF) 300 MG capsule; Take 1 capsule (300 mg total) by mouth 2 (two) times daily. for 10 days  Dispense: 20 capsule; Refill: 0  Hydrate well  Rest  Report to ER immediately if symptoms worsen or persist               No follow-ups on file.         [1]   Social History  Socioeconomic History    Marital status:      Spouse name: Alexi    Number of children: 2   Occupational History    Occupation:      Employer: [employer]   Tobacco Use    Smoking status: Never    Smokeless tobacco: Never   Substance and Sexual Activity    Alcohol use: Yes     Comment: occasional glass of red wine    Drug use: No    Sexual activity: Not Currently     Partners: Male     Birth control/protection: Post-menopausal     Social Drivers of Health     Financial Resource Strain: Low Risk  (1/10/2025)    Overall Financial Resource Strain (CARDIA)     Difficulty of Paying Living Expenses: Not hard at all   Food Insecurity: No Food Insecurity (1/10/2025)    Hunger Vital Sign     Worried About Running Out of Food in the Last Year: Never true     Ran Out of Food in the Last Year: Never true   Transportation Needs: No Transportation Needs (1/4/2024)    PRAPARE - Transportation     Lack of Transportation (Medical): No     Lack of Transportation (Non-Medical): No   Physical Activity: Insufficiently Active (1/10/2025)    Exercise Vital Sign     Days of Exercise per Week: 3 days      Minutes of Exercise per Session: 20 min   Stress: No Stress Concern Present (1/10/2025)    [employer] [employer] - Occupational Stress Questionnaire     Feeling of Stress : Not at all   Housing Stability: Low Risk  (1/4/2024)    Housing Stability Vital Sign     Unable to Pay for Housing in the Last Year: No     Number of Places Lived in the Last Year: 1     Unstable Housing in the Last Year: No
